# Patient Record
Sex: FEMALE | Race: WHITE | NOT HISPANIC OR LATINO | Employment: STUDENT | URBAN - METROPOLITAN AREA
[De-identification: names, ages, dates, MRNs, and addresses within clinical notes are randomized per-mention and may not be internally consistent; named-entity substitution may affect disease eponyms.]

---
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
## 2023-06-07 ENCOUNTER — TRANSCRIBE ORDERS (OUTPATIENT)
Dept: NUTRITION | Facility: HOSPITAL | Age: 23
End: 2023-06-07
Payer: COMMERCIAL

## 2023-06-07 DIAGNOSIS — R63.5 ABNORMAL WEIGHT GAIN: Primary | ICD-10-CM

## 2024-11-07 ENCOUNTER — HOSPITAL ENCOUNTER (INPATIENT)
Facility: HOSPITAL | Age: 24
LOS: 7 days | Discharge: HOME | End: 2024-11-15
Attending: EMERGENCY MEDICINE | Admitting: STUDENT IN AN ORGANIZED HEALTH CARE EDUCATION/TRAINING PROGRAM
Payer: COMMERCIAL

## 2024-11-07 DIAGNOSIS — K81.9 CHOLECYSTITIS: ICD-10-CM

## 2024-11-07 DIAGNOSIS — K80.71 CALCULUS OF GALLBLADDER AND BILE DUCT WITH OBSTRUCTION WITHOUT CHOLECYSTITIS: Primary | ICD-10-CM

## 2024-11-07 DIAGNOSIS — R17 JAUNDICE: ICD-10-CM

## 2024-11-07 LAB
ALBUMIN SERPL-MCNC: 4.2 G/DL (ref 3.5–5.2)
ALBUMIN/GLOB SERPL: 1.5 G/DL
ALP SERPL-CCNC: 325 U/L (ref 39–117)
ALT SERPL W P-5'-P-CCNC: 519 U/L (ref 1–33)
ANION GAP SERPL CALCULATED.3IONS-SCNC: 14 MMOL/L (ref 5–15)
AST SERPL-CCNC: 177 U/L (ref 1–32)
BASOPHILS # BLD AUTO: 0.04 10*3/MM3 (ref 0–0.2)
BASOPHILS NFR BLD AUTO: 0.4 % (ref 0–1.5)
BILIRUB SERPL-MCNC: 3.2 MG/DL (ref 0–1.2)
BUN SERPL-MCNC: 9 MG/DL (ref 6–20)
BUN/CREAT SERPL: 10.8 (ref 7–25)
CALCIUM SPEC-SCNC: 9.1 MG/DL (ref 8.6–10.5)
CHLORIDE SERPL-SCNC: 108 MMOL/L (ref 98–107)
CO2 SERPL-SCNC: 21 MMOL/L (ref 22–29)
CREAT SERPL-MCNC: 0.83 MG/DL (ref 0.57–1)
D-LACTATE SERPL-SCNC: 1.1 MMOL/L (ref 0.5–2)
DEPRECATED RDW RBC AUTO: 47.8 FL (ref 37–54)
EGFRCR SERPLBLD CKD-EPI 2021: 103 ML/MIN/1.73
EOSINOPHIL # BLD AUTO: 0.14 10*3/MM3 (ref 0–0.4)
EOSINOPHIL NFR BLD AUTO: 1.4 % (ref 0.3–6.2)
ERYTHROCYTE [DISTWIDTH] IN BLOOD BY AUTOMATED COUNT: 14 % (ref 12.3–15.4)
GLOBULIN UR ELPH-MCNC: 2.8 GM/DL
GLUCOSE SERPL-MCNC: 117 MG/DL (ref 65–99)
HCT VFR BLD AUTO: 38 % (ref 34–46.6)
HGB BLD-MCNC: 12.4 G/DL (ref 12–15.9)
IMM GRANULOCYTES # BLD AUTO: 0.03 10*3/MM3 (ref 0–0.05)
IMM GRANULOCYTES NFR BLD AUTO: 0.3 % (ref 0–0.5)
LIPASE SERPL-CCNC: 60 U/L (ref 13–60)
LYMPHOCYTES # BLD AUTO: 1.46 10*3/MM3 (ref 0.7–3.1)
LYMPHOCYTES NFR BLD AUTO: 14.9 % (ref 19.6–45.3)
MCH RBC QN AUTO: 30.2 PG (ref 26.6–33)
MCHC RBC AUTO-ENTMCNC: 32.6 G/DL (ref 31.5–35.7)
MCV RBC AUTO: 92.5 FL (ref 79–97)
MONOCYTES # BLD AUTO: 0.65 10*3/MM3 (ref 0.1–0.9)
MONOCYTES NFR BLD AUTO: 6.6 % (ref 5–12)
NEUTROPHILS NFR BLD AUTO: 7.46 10*3/MM3 (ref 1.7–7)
NEUTROPHILS NFR BLD AUTO: 76.4 % (ref 42.7–76)
NRBC BLD AUTO-RTO: 0 /100 WBC (ref 0–0.2)
PLATELET # BLD AUTO: 337 10*3/MM3 (ref 140–450)
PMV BLD AUTO: 10.5 FL (ref 6–12)
POTASSIUM SERPL-SCNC: 4.1 MMOL/L (ref 3.5–5.2)
PROCALCITONIN SERPL-MCNC: 0.12 NG/ML (ref 0–0.25)
PROT SERPL-MCNC: 7 G/DL (ref 6–8.5)
RBC # BLD AUTO: 4.11 10*6/MM3 (ref 3.77–5.28)
SODIUM SERPL-SCNC: 143 MMOL/L (ref 136–145)
WBC NRBC COR # BLD AUTO: 9.78 10*3/MM3 (ref 3.4–10.8)

## 2024-11-07 PROCEDURE — 25010000002 ONDANSETRON PER 1 MG: Performed by: EMERGENCY MEDICINE

## 2024-11-07 PROCEDURE — 85025 COMPLETE CBC W/AUTO DIFF WBC: CPT | Performed by: EMERGENCY MEDICINE

## 2024-11-07 PROCEDURE — 80053 COMPREHEN METABOLIC PANEL: CPT | Performed by: EMERGENCY MEDICINE

## 2024-11-07 PROCEDURE — 84145 PROCALCITONIN (PCT): CPT | Performed by: EMERGENCY MEDICINE

## 2024-11-07 PROCEDURE — 84702 CHORIONIC GONADOTROPIN TEST: CPT | Performed by: EMERGENCY MEDICINE

## 2024-11-07 PROCEDURE — 25010000002 MORPHINE PER 10 MG: Performed by: EMERGENCY MEDICINE

## 2024-11-07 PROCEDURE — 83605 ASSAY OF LACTIC ACID: CPT | Performed by: EMERGENCY MEDICINE

## 2024-11-07 PROCEDURE — 99285 EMERGENCY DEPT VISIT HI MDM: CPT

## 2024-11-07 PROCEDURE — 83690 ASSAY OF LIPASE: CPT | Performed by: EMERGENCY MEDICINE

## 2024-11-07 RX ORDER — MORPHINE SULFATE 4 MG/ML
4 INJECTION, SOLUTION INTRAMUSCULAR; INTRAVENOUS ONCE
Status: COMPLETED | OUTPATIENT
Start: 2024-11-07 | End: 2024-11-07

## 2024-11-07 RX ORDER — ONDANSETRON 2 MG/ML
4 INJECTION INTRAMUSCULAR; INTRAVENOUS ONCE
Status: COMPLETED | OUTPATIENT
Start: 2024-11-07 | End: 2024-11-07

## 2024-11-07 RX ORDER — SODIUM CHLORIDE 0.9 % (FLUSH) 0.9 %
10 SYRINGE (ML) INJECTION AS NEEDED
Status: DISCONTINUED | OUTPATIENT
Start: 2024-11-07 | End: 2024-11-15 | Stop reason: HOSPADM

## 2024-11-07 RX ADMIN — MORPHINE SULFATE 4 MG: 4 INJECTION, SOLUTION INTRAMUSCULAR; INTRAVENOUS at 23:07

## 2024-11-07 RX ADMIN — ONDANSETRON 4 MG: 2 INJECTION INTRAMUSCULAR; INTRAVENOUS at 23:07

## 2024-11-08 ENCOUNTER — APPOINTMENT (OUTPATIENT)
Dept: MRI IMAGING | Facility: HOSPITAL | Age: 24
End: 2024-11-08
Payer: COMMERCIAL

## 2024-11-08 ENCOUNTER — ANESTHESIA EVENT (OUTPATIENT)
Dept: GASTROENTEROLOGY | Facility: HOSPITAL | Age: 24
End: 2024-11-08
Payer: COMMERCIAL

## 2024-11-08 ENCOUNTER — ANESTHESIA (OUTPATIENT)
Dept: GASTROENTEROLOGY | Facility: HOSPITAL | Age: 24
End: 2024-11-08
Payer: COMMERCIAL

## 2024-11-08 ENCOUNTER — APPOINTMENT (OUTPATIENT)
Dept: ULTRASOUND IMAGING | Facility: HOSPITAL | Age: 24
End: 2024-11-08
Payer: COMMERCIAL

## 2024-11-08 ENCOUNTER — APPOINTMENT (OUTPATIENT)
Dept: CT IMAGING | Facility: HOSPITAL | Age: 24
End: 2024-11-08
Payer: COMMERCIAL

## 2024-11-08 ENCOUNTER — APPOINTMENT (OUTPATIENT)
Dept: GENERAL RADIOLOGY | Facility: HOSPITAL | Age: 24
End: 2024-11-08
Payer: COMMERCIAL

## 2024-11-08 PROBLEM — K80.50 CHOLEDOCHOLITHIASIS: Status: ACTIVE | Noted: 2024-11-08

## 2024-11-08 PROBLEM — K80.21 CALCULUS OF GALLBLADDER WITH BILIARY OBSTRUCTION BUT WITHOUT CHOLECYSTITIS: Status: ACTIVE | Noted: 2024-11-08

## 2024-11-08 PROBLEM — K80.71 CALCULUS OF GALLBLADDER AND BILE DUCT WITH OBSTRUCTION: Status: ACTIVE | Noted: 2024-11-08

## 2024-11-08 PROBLEM — K80.70 CALCULUS OF GALLBLADDER AND BILE DUCT WITHOUT CHOLECYSTITIS: Status: ACTIVE | Noted: 2024-11-08

## 2024-11-08 PROBLEM — F39 MOOD DISORDER: Status: ACTIVE | Noted: 2024-11-08

## 2024-11-08 PROBLEM — K83.1 BILIARY OBSTRUCTION: Status: ACTIVE | Noted: 2024-11-08

## 2024-11-08 LAB
ALBUMIN SERPL-MCNC: 3.9 G/DL (ref 3.5–5.2)
ALBUMIN/GLOB SERPL: 1.3 G/DL
ALP SERPL-CCNC: 312 U/L (ref 39–117)
ALT SERPL W P-5'-P-CCNC: 467 U/L (ref 1–33)
ANION GAP SERPL CALCULATED.3IONS-SCNC: 14 MMOL/L (ref 5–15)
AST SERPL-CCNC: 142 U/L (ref 1–32)
BASOPHILS # BLD AUTO: 0.04 10*3/MM3 (ref 0–0.2)
BASOPHILS NFR BLD AUTO: 0.5 % (ref 0–1.5)
BILIRUB SERPL-MCNC: 2.9 MG/DL (ref 0–1.2)
BILIRUB UR QL STRIP: NEGATIVE
BUN SERPL-MCNC: 9 MG/DL (ref 6–20)
BUN/CREAT SERPL: 11.7 (ref 7–25)
CALCIUM SPEC-SCNC: 9.1 MG/DL (ref 8.6–10.5)
CHLORIDE SERPL-SCNC: 112 MMOL/L (ref 98–107)
CLARITY UR: CLEAR
CO2 SERPL-SCNC: 21 MMOL/L (ref 22–29)
COLOR UR: YELLOW
CREAT SERPL-MCNC: 0.77 MG/DL (ref 0.57–1)
DEPRECATED RDW RBC AUTO: 48.5 FL (ref 37–54)
EGFRCR SERPLBLD CKD-EPI 2021: 112.7 ML/MIN/1.73
EOSINOPHIL # BLD AUTO: 0.16 10*3/MM3 (ref 0–0.4)
EOSINOPHIL NFR BLD AUTO: 1.9 % (ref 0.3–6.2)
ERYTHROCYTE [DISTWIDTH] IN BLOOD BY AUTOMATED COUNT: 14.1 % (ref 12.3–15.4)
GLOBULIN UR ELPH-MCNC: 2.9 GM/DL
GLUCOSE SERPL-MCNC: 91 MG/DL (ref 65–99)
GLUCOSE UR STRIP-MCNC: NEGATIVE MG/DL
HCG INTACT+B SERPL-ACNC: <0.1 MIU/ML
HCT VFR BLD AUTO: 37.6 % (ref 34–46.6)
HGB BLD-MCNC: 12.3 G/DL (ref 12–15.9)
HGB UR QL STRIP.AUTO: NEGATIVE
IMM GRANULOCYTES # BLD AUTO: 0.02 10*3/MM3 (ref 0–0.05)
IMM GRANULOCYTES NFR BLD AUTO: 0.2 % (ref 0–0.5)
KETONES UR QL STRIP: NEGATIVE
LEUKOCYTE ESTERASE UR QL STRIP.AUTO: NEGATIVE
LYMPHOCYTES # BLD AUTO: 2.2 10*3/MM3 (ref 0.7–3.1)
LYMPHOCYTES NFR BLD AUTO: 25.5 % (ref 19.6–45.3)
MCH RBC QN AUTO: 30.2 PG (ref 26.6–33)
MCHC RBC AUTO-ENTMCNC: 32.7 G/DL (ref 31.5–35.7)
MCV RBC AUTO: 92.4 FL (ref 79–97)
MONOCYTES # BLD AUTO: 0.77 10*3/MM3 (ref 0.1–0.9)
MONOCYTES NFR BLD AUTO: 8.9 % (ref 5–12)
NEUTROPHILS NFR BLD AUTO: 5.43 10*3/MM3 (ref 1.7–7)
NEUTROPHILS NFR BLD AUTO: 63 % (ref 42.7–76)
NITRITE UR QL STRIP: NEGATIVE
NRBC BLD AUTO-RTO: 0 /100 WBC (ref 0–0.2)
PH UR STRIP.AUTO: 5.5 [PH] (ref 5–8)
PLATELET # BLD AUTO: 315 10*3/MM3 (ref 140–450)
PMV BLD AUTO: 10.6 FL (ref 6–12)
POTASSIUM SERPL-SCNC: 4.4 MMOL/L (ref 3.5–5.2)
PROT SERPL-MCNC: 6.8 G/DL (ref 6–8.5)
PROT UR QL STRIP: NEGATIVE
RBC # BLD AUTO: 4.07 10*6/MM3 (ref 3.77–5.28)
SODIUM SERPL-SCNC: 147 MMOL/L (ref 136–145)
SP GR UR STRIP: 1.04 (ref 1–1.03)
UROBILINOGEN UR QL STRIP: ABNORMAL
WBC NRBC COR # BLD AUTO: 8.62 10*3/MM3 (ref 3.4–10.8)

## 2024-11-08 PROCEDURE — 76705 ECHO EXAM OF ABDOMEN: CPT

## 2024-11-08 PROCEDURE — 81003 URINALYSIS AUTO W/O SCOPE: CPT | Performed by: EMERGENCY MEDICINE

## 2024-11-08 PROCEDURE — 25510000001 IOPAMIDOL 61 % SOLUTION: Performed by: EMERGENCY MEDICINE

## 2024-11-08 PROCEDURE — 25010000002 PIPERACILLIN SOD-TAZOBACTAM PER 1 G: Performed by: NURSE PRACTITIONER

## 2024-11-08 PROCEDURE — 74177 CT ABD & PELVIS W/CONTRAST: CPT

## 2024-11-08 PROCEDURE — 85025 COMPLETE CBC W/AUTO DIFF WBC: CPT | Performed by: NURSE PRACTITIONER

## 2024-11-08 PROCEDURE — 80053 COMPREHEN METABOLIC PANEL: CPT | Performed by: NURSE PRACTITIONER

## 2024-11-08 PROCEDURE — 87040 BLOOD CULTURE FOR BACTERIA: CPT | Performed by: NURSE PRACTITIONER

## 2024-11-08 PROCEDURE — 99223 1ST HOSP IP/OBS HIGH 75: CPT | Performed by: NURSE PRACTITIONER

## 2024-11-08 PROCEDURE — 25810000003 SODIUM CHLORIDE 0.9 % SOLUTION: Performed by: NURSE PRACTITIONER

## 2024-11-08 PROCEDURE — 74181 MRI ABDOMEN W/O CONTRAST: CPT

## 2024-11-08 PROCEDURE — 99222 1ST HOSP IP/OBS MODERATE 55: CPT | Performed by: PHYSICIAN ASSISTANT

## 2024-11-08 PROCEDURE — 25010000002 CEFTRIAXONE PER 250 MG: Performed by: INTERNAL MEDICINE

## 2024-11-08 PROCEDURE — 25810000003 SODIUM CHLORIDE 0.9 % SOLUTION: Performed by: INTERNAL MEDICINE

## 2024-11-08 RX ORDER — AMOXICILLIN 250 MG
2 CAPSULE ORAL 2 TIMES DAILY
Status: DISCONTINUED | OUTPATIENT
Start: 2024-11-08 | End: 2024-11-11

## 2024-11-08 RX ORDER — POLYETHYLENE GLYCOL 3350 17 G/17G
17 POWDER, FOR SOLUTION ORAL DAILY PRN
Status: DISCONTINUED | OUTPATIENT
Start: 2024-11-08 | End: 2024-11-11

## 2024-11-08 RX ORDER — NITROGLYCERIN 0.4 MG/1
0.4 TABLET SUBLINGUAL
Status: DISCONTINUED | OUTPATIENT
Start: 2024-11-08 | End: 2024-11-08

## 2024-11-08 RX ORDER — SODIUM CHLORIDE 0.9 % (FLUSH) 0.9 %
10 SYRINGE (ML) INJECTION AS NEEDED
Status: CANCELLED | OUTPATIENT
Start: 2024-11-08

## 2024-11-08 RX ORDER — METRONIDAZOLE 500 MG/1
500 TABLET ORAL EVERY 8 HOURS SCHEDULED
Status: DISCONTINUED | OUTPATIENT
Start: 2024-11-08 | End: 2024-11-10

## 2024-11-08 RX ORDER — DEXTROAMPHETAMINE SACCHARATE, AMPHETAMINE ASPARTATE MONOHYDRATE, DEXTROAMPHETAMINE SULFATE AND AMPHETAMINE SULFATE 7.5; 7.5; 7.5; 7.5 MG/1; MG/1; MG/1; MG/1
1 CAPSULE, EXTENDED RELEASE ORAL DAILY
COMMUNITY
Start: 2024-10-18

## 2024-11-08 RX ORDER — MORPHINE SULFATE 2 MG/ML
2 INJECTION, SOLUTION INTRAMUSCULAR; INTRAVENOUS
Status: DISCONTINUED | OUTPATIENT
Start: 2024-11-08 | End: 2024-11-13

## 2024-11-08 RX ORDER — BISACODYL 5 MG/1
5 TABLET, DELAYED RELEASE ORAL DAILY PRN
Status: DISCONTINUED | OUTPATIENT
Start: 2024-11-08 | End: 2024-11-11

## 2024-11-08 RX ORDER — HYDROCODONE BITARTRATE AND ACETAMINOPHEN 5; 325 MG/1; MG/1
1 TABLET ORAL EVERY 6 HOURS PRN
Status: DISCONTINUED | OUTPATIENT
Start: 2024-11-08 | End: 2024-11-15 | Stop reason: HOSPADM

## 2024-11-08 RX ORDER — IOPAMIDOL 612 MG/ML
85 INJECTION, SOLUTION INTRAVASCULAR
Status: COMPLETED | OUTPATIENT
Start: 2024-11-08 | End: 2024-11-08

## 2024-11-08 RX ORDER — ACETAMINOPHEN 325 MG/1
650 TABLET ORAL EVERY 6 HOURS PRN
Status: DISCONTINUED | OUTPATIENT
Start: 2024-11-08 | End: 2024-11-15 | Stop reason: HOSPADM

## 2024-11-08 RX ORDER — SODIUM CHLORIDE 9 MG/ML
75 INJECTION, SOLUTION INTRAVENOUS CONTINUOUS
Status: DISCONTINUED | OUTPATIENT
Start: 2024-11-08 | End: 2024-11-09

## 2024-11-08 RX ORDER — ONDANSETRON 2 MG/ML
4 INJECTION INTRAMUSCULAR; INTRAVENOUS EVERY 6 HOURS PRN
Status: DISCONTINUED | OUTPATIENT
Start: 2024-11-08 | End: 2024-11-10 | Stop reason: SDUPTHER

## 2024-11-08 RX ORDER — FAMOTIDINE 20 MG/1
20 TABLET, FILM COATED ORAL ONCE
Status: CANCELLED | OUTPATIENT
Start: 2024-11-08 | End: 2024-11-08

## 2024-11-08 RX ORDER — MIDAZOLAM HYDROCHLORIDE 1 MG/ML
1 INJECTION, SOLUTION INTRAMUSCULAR; INTRAVENOUS
Status: CANCELLED | OUTPATIENT
Start: 2024-11-08

## 2024-11-08 RX ORDER — BISACODYL 10 MG
10 SUPPOSITORY, RECTAL RECTAL DAILY PRN
Status: DISCONTINUED | OUTPATIENT
Start: 2024-11-08 | End: 2024-11-11

## 2024-11-08 RX ORDER — SODIUM CHLORIDE, SODIUM LACTATE, POTASSIUM CHLORIDE, CALCIUM CHLORIDE 600; 310; 30; 20 MG/100ML; MG/100ML; MG/100ML; MG/100ML
9 INJECTION, SOLUTION INTRAVENOUS CONTINUOUS
Status: CANCELLED | OUTPATIENT
Start: 2024-11-09 | End: 2024-11-09

## 2024-11-08 RX ORDER — FAMOTIDINE 10 MG/ML
20 INJECTION, SOLUTION INTRAVENOUS ONCE
Status: CANCELLED | OUTPATIENT
Start: 2024-11-08 | End: 2024-11-08

## 2024-11-08 RX ORDER — FLUOXETINE 40 MG/1
1 CAPSULE ORAL DAILY
COMMUNITY
Start: 2024-10-09

## 2024-11-08 RX ORDER — SODIUM CHLORIDE 0.9 % (FLUSH) 0.9 %
10 SYRINGE (ML) INJECTION EVERY 12 HOURS SCHEDULED
Status: CANCELLED | OUTPATIENT
Start: 2024-11-08

## 2024-11-08 RX ORDER — LIDOCAINE HYDROCHLORIDE 10 MG/ML
0.5 INJECTION, SOLUTION EPIDURAL; INFILTRATION; INTRACAUDAL; PERINEURAL ONCE AS NEEDED
Status: CANCELLED | OUTPATIENT
Start: 2024-11-08

## 2024-11-08 RX ADMIN — METRONIDAZOLE 500 MG: 500 TABLET ORAL at 21:42

## 2024-11-08 RX ADMIN — SENNOSIDES AND DOCUSATE SODIUM 2 TABLET: 50; 8.6 TABLET ORAL at 21:43

## 2024-11-08 RX ADMIN — SENNOSIDES AND DOCUSATE SODIUM 2 TABLET: 50; 8.6 TABLET ORAL at 09:15

## 2024-11-08 RX ADMIN — FLUOXETINE HYDROCHLORIDE 40 MG: 20 CAPSULE ORAL at 09:16

## 2024-11-08 RX ADMIN — IOPAMIDOL 85 ML: 612 INJECTION, SOLUTION INTRAVENOUS at 00:45

## 2024-11-08 RX ADMIN — METRONIDAZOLE 500 MG: 500 TABLET ORAL at 14:28

## 2024-11-08 RX ADMIN — HYDROCODONE BITARTRATE AND ACETAMINOPHEN 1 TABLET: 5; 325 TABLET ORAL at 22:50

## 2024-11-08 RX ADMIN — SODIUM CHLORIDE 75 ML/HR: 9 INJECTION, SOLUTION INTRAVENOUS at 21:44

## 2024-11-08 RX ADMIN — PIPERACILLIN AND TAZOBACTAM 3.38 G: 3; .375 INJECTION, POWDER, LYOPHILIZED, FOR SOLUTION INTRAVENOUS at 03:54

## 2024-11-08 RX ADMIN — METRONIDAZOLE 500 MG: 500 TABLET ORAL at 09:16

## 2024-11-08 RX ADMIN — SODIUM CHLORIDE 75 ML/HR: 9 INJECTION, SOLUTION INTRAVENOUS at 03:41

## 2024-11-08 RX ADMIN — HYDROCODONE BITARTRATE AND ACETAMINOPHEN 1 TABLET: 5; 325 TABLET ORAL at 16:35

## 2024-11-08 RX ADMIN — SODIUM CHLORIDE 2000 MG: 900 INJECTION INTRAVENOUS at 09:15

## 2024-11-08 NOTE — PLAN OF CARE
Goal Outcome Evaluation:  Plan of Care Reviewed With: patient        Progress: no change  Outcome Evaluation: Pt transferred to floor from ED. VSS on RA. MRI obtained during shift and IV abx initiated. Pt up ad rowena and independent. No c/o pain. No c/o N/V. Family at bedside. No other concerns noted at this time.

## 2024-11-08 NOTE — PLAN OF CARE
Goal Outcome Evaluation:              Outcome Evaluation: VSS on ra. ERCP completed. scheduled for surgery on 11/09. NPO at [facility]. PRN pain meds given x1. family at bedside. no concerns noted at thistime. continue with current POC.

## 2024-11-08 NOTE — ED NOTES
Eder Wills    Nursing Report ED to Floor:  Mental status: A&Ox4  Ambulatory status: Independent  Oxygen Therapy:  RA  Cardiac Rhythm: WDL  Admitted from: Home/ED  Safety Concerns:  None  Social Issues: None  ED Room #:  06    ED Nurse Phone Extension - [phone] or may call [phone].      HPI:   Chief Complaint   Patient presents with    Flank Pain       Past Medical History:  No past medical history on file.     Past Surgical History:  No past surgical history on file.     Admitting Doctor:   Perry Doshi DO    Consulting Provider(s):  Consults       No orders found for last 30 day(s).             Admitting Diagnosis:   The primary encounter diagnosis was Calculus of gallbladder and bile duct with obstruction without cholecystitis. A diagnosis of Jaundice was also pertinent to this visit.    Most Recent Vitals:   Vitals:    11/08/24 0102 11/08/24 0112 11/08/24 0130 11/08/24 0136   BP: 112/68 111/75 106/66    Pulse: 75  72 85   Resp:   16    Temp:       TempSrc:       SpO2: 98%  98% 98%   Weight:       Height:           Active LDAs/IV Access:   Lines, Drains & Airways       Active LDAs       Name Placement date Placement time Site Days    Peripheral IV 11/07/24 2244 Right Antecubital 11/07/24 2244  Antecubital  less than 1                    Labs (abnormal labs have a star):   Labs Reviewed   COMPREHENSIVE METABOLIC PANEL - Abnormal; Notable for the following components:       Result Value    Glucose 117 (*)     Chloride 108 (*)     CO2 21.0 (*)     ALT (SGPT) 519 (*)     AST (SGOT) 177 (*)     Alkaline Phosphatase 325 (*)     Total Bilirubin 3.2 (*)     All other components within normal limits    Narrative:     GFR Normal >60  Chronic Kidney Disease <60  Kidney Failure <15     CBC WITH AUTO DIFFERENTIAL - Abnormal; Notable for the following components:    Neutrophil % 76.4 (*)     Lymphocyte % 14.9 (*)     Neutrophils, Absolute 7.46 (*)     All other components within normal limits   URINALYSIS W/ MICROSCOPIC IF  "INDICATED (NO CULTURE) - Abnormal; Notable for the following components:    Specific Gravity, UA 1.043 (*)     All other components within normal limits    Narrative:     Urine microscopic not indicated.   LIPASE - Normal   LACTIC ACID, PLASMA - Normal   PROCALCITONIN - Normal    Narrative:     As a Marker for Sepsis (Non-Neonates):    1. <0.5 ng/mL represents a low risk of severe sepsis and/or septic shock.  2. >2 ng/mL represents a high risk of severe sepsis and/or septic shock.    As a Marker for Lower Respiratory Tract Infections that require antibiotic therapy:    PCT on Admission    Antibiotic Therapy       6-12 Hrs later    >0.5                Strongly Recommended  >0.25 - <0.5        Recommended   0.1 - 0.25          Discouraged              Remeasure/reassess PCT  <0.1                Strongly Discouraged     Remeasure/reassess PCT    As 28 day mortality risk marker: \"Change in Procalcitonin Result\" (>80% or <=80%) if Day 0 (or Day 1) and Day 4 values are available. Refer to http://www.[email or web address] [email or web address]cal[facility]    Change in PCT <=80%  A decrease of PCT levels below or equal to 80% defines a positive change in PCT test result representing a higher risk for 28-day all-cause mortality of patients diagnosed with severe sepsis for septic shock.    Change in PCT >80%  A decrease of PCT levels of more than 80% defines a negative change in PCT result representing a lower risk for 28-day all-cause mortality of patients diagnosed with severe sepsis or septic shock.      HCG, QUANTITATIVE, PREGNANCY    Narrative:     HCG Ranges by Gestational Age    Females - non-pregnant premenopausal   </= 1mIU/mL HCG  Females - postmenopausal               </= 7mIU/mL HCG    3 Weeks         5.8 -    71.2 mIU/mL  4 Weeks         9.5 -     750 mIU/mL  5 Weeks         217 -   7,138 mIU/mL  6 Weeks         158 -  31,795 mIU/mL  7 Weeks       3,697 - 163,563 mIU/mL  8 Weeks      32,065 - 149,571 mIU/mL  9 Weeks      63,803 - 151,410 " mIU/mL  10 Weeks     46,509 - 186,977 mIU/mL  12 Weeks     27,832 - 210,612 mIU/mL  14 Weeks     13,950 -  62,530 mIU/mL  15 Weeks     12,039 -  70,971 mIU/mL  16 Weeks      9,040 -  56,451 mIU/mL  17 Weeks      8,175 -  55,868 mIU/mL  18 Weeks      8,099 -  58,176 mIU/mL  Results may be falsely decreased if patient taking Biotin.     CBC AND DIFFERENTIAL    Narrative:     The following orders were created for panel order CBC & Differential.  Procedure                               Abnormality         Status                     ---------                               -----------         ------                     CBC Auto Differential[[id number]]         Abnormal            Final result                 Please view results for these tests on the individual orders.       Meds Given in ED:   Medications   sodium chloride 0.9 % flush 10 mL (has no administration in time range)   ondansetron (ZOFRAN) injection 4 mg (4 mg Intravenous Given 11/7/24 2307)   Morphine sulfate (PF) injection 4 mg (4 mg Intravenous Given 11/7/24 2307)   iopamidol (ISOVUE-300) 61 % injection 85 mL (85 mL Intravenous Given 11/8/24 0045)           Last NIH score:                                                          Dysphagia screening results:  Patient Factors Component (Dysphagia:Stroke or Rule-out)  Best Eye Response: 4-->(E4) spontaneous (11/07/24 2314)  Best Motor Response: 6-->(M6) obeys commands (11/07/24 2314)  Best Verbal Response: 5-->(V5) oriented (11/07/24 2314)  Gurley Coma Scale Score: 15 (11/07/24 2314)     Gurley Coma Scale:  No data recorded     CIWA:        Restraint Type:            Isolation Status:  No active isolations

## 2024-11-08 NOTE — H&P
[facility] Medicine Services  HISTORY AND PHYSICAL    Patient Name: Eder Wills  : 2000  MRN: [id number]  Primary Care Physician: Eva Tovar MD  Date of admission: 2024    Subjective   Subjective     Chief Complaint:  Right upper quadrant pain     HPI:  Eder Wills is a 23 y.o. female with no significant past medical history presents to the ED with complaints of RUQ abdominal pain over the past few weeks.  Patient states she was diagnosed with gallbladder disease and scheduled to see a surgeon outpatient however the pain worsened this evening prompting her arrival to the ED. She denies any recent fever, chills, vomiting, cough, shortness of air, diarrhea or dysuria. She has noticed yellowing of her eyes.   Patient is currently on wegovy and states she has been for about a year.  CT abdomen and pelvis obtained tonight shows cholelithiasis with mild stranding around the gallbladder wall and mild intra and proximal extrahepatic biliary ductal dilatation.  Patient will be admitted to [facility] under the care of the hospitalist for the evaluation and treatment.        Review of Systems   Constitutional:  Positive for appetite change. Negative for activity change, chills, diaphoresis, fatigue, fever and unexpected weight change.   HENT: Negative.     Eyes:  Negative for photophobia and visual disturbance.   Respiratory:  Negative for cough and shortness of breath.    Cardiovascular:  Negative for chest pain, palpitations and leg swelling.   Gastrointestinal:  Positive for abdominal pain and nausea. Negative for abdominal distention, blood in stool, constipation, diarrhea and vomiting.   Genitourinary: Negative.    Musculoskeletal:  Positive for back pain. Negative for neck pain and neck stiffness.   Skin: Negative.    Neurological: Negative.    Psychiatric/Behavioral: Negative.                  Personal History     No past medical history on  file.          No past surgical history on file.    Family History:  family history includes No Known Problems in her father and mother.     Social History:  reports that she has never smoked. She has never used smokeless tobacco. She reports current alcohol use. She reports that she does not use drugs.  Social History     Social History Narrative    Not on file       Medications:       No Known Allergies    Objective   Objective     Vital Signs:   Temp:  [98.2 °F (36.8 °C)] 98.2 °F (36.8 °C)  Heart Rate:  [] 72  Resp:  [16-20] 16  BP: (101-132)/(65-79) 106/66    Physical Exam  Vitals and nursing note reviewed.   Constitutional:       General: She is not in acute distress.     Appearance: Normal appearance. She is not ill-appearing, toxic-appearing or diaphoretic.   HENT:      Head: Normocephalic and atraumatic.      Nose: Nose normal.      Mouth/Throat:      Mouth: Mucous membranes are dry.      Pharynx: Oropharynx is clear.   Eyes:      Extraocular Movements: Extraocular movements intact.      Conjunctiva/sclera: Conjunctivae normal.      Pupils: Pupils are equal, round, and reactive to light.   Cardiovascular:      Rate and Rhythm: Normal rate and regular rhythm.      Pulses: Normal pulses.      Heart sounds: Normal heart sounds.   Pulmonary:      Effort: Pulmonary effort is normal.      Breath sounds: Normal breath sounds.   Abdominal:      General: Bowel sounds are normal. There is no distension.      Palpations: Abdomen is soft. There is no mass.      Tenderness: There is abdominal tenderness. There is no right CVA tenderness, left CVA tenderness, guarding or rebound.      Hernia: No hernia is present.      Comments: RUQ and epigastric tenderness    Musculoskeletal:         General: No swelling, tenderness, deformity or signs of injury. Normal range of motion.      Cervical back: Normal range of motion and neck supple.      Right lower leg: No edema.      Left lower leg: No edema.   Skin:     General:  Skin is warm and dry.   Neurological:      General: No focal deficit present.      Mental Status: She is alert and oriented to person, place, and time. Mental status is at baseline.   Psychiatric:         Mood and Affect: Mood normal.         Behavior: Behavior normal.         Thought Content: Thought content normal.         Judgment: Judgment normal.            Result Review:  I have personally reviewed the results from the time of this admission to 11/8/2024 02:31 EST and agree with these findings:  [x]  Laboratory list / accordion  [x]  Microbiology  [x]  Radiology  [x]  EKG/Telemetry   []  Cardiology/Vascular   []  Pathology  []  Old records  []  Other:  Most notable findings include:     LAB RESULTS:      Lab 11/07/24  2240   WBC 9.78   HEMOGLOBIN 12.4   HEMATOCRIT 38.0   PLATELETS 337   NEUTROS ABS 7.46*   IMMATURE GRANS (ABS) 0.03   LYMPHS ABS 1.46   MONOS ABS 0.65   EOS ABS 0.14   MCV 92.5   PROCALCITONIN 0.12   LACTATE 1.1         Lab 11/07/24  2240   SODIUM 143   POTASSIUM 4.1   CHLORIDE 108*   CO2 21.0*   ANION GAP 14.0   BUN 9   CREATININE 0.83   EGFR 103.0   GLUCOSE 117*   CALCIUM 9.1         Lab 11/07/24  2240   TOTAL PROTEIN 7.0   ALBUMIN 4.2   GLOBULIN 2.8   ALT (SGPT) 519*   AST (SGOT) 177*   BILIRUBIN 3.2*   ALK PHOS 325*   LIPASE 60                     Brief Urine Lab Results  (Last result in the past 365 days)        Color   Clarity   Blood   Leuk Est   Nitrite   Protein   CREAT   Urine HCG        11/08/24 0113 Yellow   Clear   Negative   Negative   Negative   Negative                 Microbiology Results (last 10 days)       ** No results found for the last 240 hours. **            US Gallbladder    Result Date: 11/8/2024  US GALLBLADDER Date of Exam: 11/8/2024 1:33 AM EST Indication: RUQ pain. Comparison: CT abdomen pelvis dated November 8, 2024 Technique: Grayscale and color Doppler ultrasound evaluation of the right upper quadrant was performed. Findings: The liver is homogeneous. There are  no focal liver lesions. Portal venous and hepatic venous flows are normal. There are multiple stones in the gallbladder. There is marked biliary ductal dilatation up to 1.5 cm. Obstructing gallstone would be in the differential. Correlation with MRCP or ERCP may be required. The visualized pancreatic tissue is normal. The right kidney is 9.3 cm in mxyk-vx-scco length and there is no hydronephrosis.     Impression: Impression: 1.Cholelithiasis. 2.Marked biliary ductal dilatation. Correlation with MRCP or ERCP may be required. Electronically Signed: Dmitriy Spaulding MD  11/8/2024 2:28 AM EST  Workstation ID: [id number]    CT Abdomen Pelvis With Contrast    Result Date: 11/8/2024  CT ABDOMEN PELVIS W CONTRAST Date of Exam: 11/8/2024 12:38 AM EST Indication: RUQ pain, jaundice. Comparison: None available. Technique: Axial CT images were obtained of the abdomen and pelvis following the uneventful intravenous administration of 85 mL Isovue-300. Reconstructed coronal and sagittal images were also obtained. Automated exposure control and iterative construction methods were used. Findings: Lung Bases:   The visualized lung bases and lower mediastinal structures are unremarkable. Liver: Liver is normal in size and CT density. No focal lesions. Biliary/Gallbladder:  There are multiple stones in the gallbladder. There is mild stranding around the gallbladder wall. There is mild intra and proximal extrahepatic biliary ductal dilatation. Spleen: Spleen is normal in size and CT density. Pancreas:  Pancreas is normal. There is no evidence of pancreatic mass or peripancreatic fluid. Kidneys:  Kidneys are normal in size. There are no stones or hydronephrosis. Adrenals:  Adrenal glands are unremarkable. Retroperitoneal/Lymph Nodes/Vasculature:  No retroperitoneal adenopathy is identified. Gastrointestinal/Mesentery:  The bowel loops are non-dilated without wall thickening or mass. The appendix appears within normal limits. No evidence of  obstruction. No free air. No mesenteric fluid collections identified. Bladder:  The bladder is normal. Genital:   Unremarkable       Bony Structures:   Visualized bony structures are consistent with the patient's age.     Impression: Impression: Cholelithiasis with mild stranding around the gallbladder wall and mild intra and proximal extrahepatic biliary ductal dilatation. Electronically Signed: Dmitriy Spaulding MD  11/8/2024 1:18 AM EST  Workstation ID: [id number]         Assessment & Plan   Assessment & Plan       Biliary obstruction    Calculus of gallbladder and bile duct with obstruction    Mood disorder    23 year old female diagnosed with gallbladder disease and scheduled to see a surgeon outpatient presents to the ED tonight due to worsening RUQ pain and jaundice.       1) Cholelithiasis with obstruction   -CT abdomen and pelvis shows cholelithiasis with mild stranding around the gallbladder wall and mild intra and proximal extrahepatic biliary ductal dilatation.   -US gallbladder pending   -MRCP   -consult GI in am   -NPO   -pain control   -IVF   -start zosyn     2) Mood disorder   -on prozac               DVT prophylaxis:  scds    CODE STATUS:  Full Code        Expected DischargeTBD     This note has been completed as part of a split-shared workflow.     Signature: Electronically signed by MARITZA Lundberg, 11/08/24, 2:32 AM EST.

## 2024-11-08 NOTE — PROGRESS NOTES
[facility] Medicine Services  ADMISSION FOLLOW-UP NOTE          Patient admitted after midnight, H&P by my partner performed earlier on today's date reviewed.  Interim findings, labs, and charting also reviewed.        The [facility] Hospital Problem List has been managed and updated to include any new diagnoses:  Active Hospital Problems    Diagnosis  POA    **Biliary obstruction [K83.1]  Yes    Calculus of gallbladder and bile duct with obstruction [K80.71]  Yes    Mood disorder [F39]  Yes      Resolved Hospital Problems   No resolved problems to display.         ADDITIONAL PLAN:  - detailed assessment and plan from admission reviewed  - Patient admitted this AM by MARITZA Lundberg, chart reviewed  - Summary: This is a 24 y/o healthy female who developed RT sided back pain, was Dx w/ cholelithiasis and scheduled for CCY, then developed worse pain and conjunctival icterus; labs and imaging on arrival c/w choledocholithiasis    Assessment/plan    Cholelithiasis with choledocholithiasis  Elevated ALP/AST/ALT/bilirubin  -GI consult pending  -General Surgery will need to be involved, consult placed  -d/w pharmacy, change Zosyn to CTX/Flagyl (due to rut'l IVF shortage and slight reduction in cdiff risk)  -Pain control    Mood disorder  -Prohankc      Kel Barron,   11/08/24

## 2024-11-08 NOTE — CASE MANAGEMENT/SOCIAL WORK
Discharge Planning Assessment  [facility]     Patient Name: Eder Wills  MRN: [id number]  Today's Date: 11/8/2024    Admit Date: 11/7/2024    Plan: Home with family transporting.   Discharge Needs Assessment       Row Name 11/08/24 1202       Living Environment    People in Home parent(s)    Name(s) of People in Home Hilary (mother [phone])    In the past 12 months has the electric, gas, oil, or water company threatened to shut off services in your home? No    Primary Care Provided by self    Provides Primary Care For no one    Family Caregiver if Needed parent(s)    Family Caregiver Names Hilary (mother [phone])    Quality of Family Relationships involved;helpful    Able to Return to Prior Arrangements yes       Transportation Needs    In the past 12 months, has lack of transportation kept you from medical appointments or from getting medications? no    In the past 12 months, has lack of transportation kept you from meetings, work, or from getting things needed for daily living? No       Food Insecurity    Within the past 12 months, you worried that your food would run out before you got the money to buy more. Never true    Within the past 12 months, the food you bought just didn't last and you didn't have money to get more. Never true       Transition Planning    Transportation Anticipated family or friend will provide  Hilary (mother [phone])       Discharge Needs Assessment    Readmission Within the Last 30 Days no previous admission in last 30 days    Equipment Currently Used at Home none                   Discharge Plan       Row Name 11/08/24 1204       Plan    Plan Home with family transporting.    Plan Comments IDALIAlinda met with patient and family at bedside. Patient lives in [place] with parents: Hilary (mother [phone]). Patient reports functioning independently with ADL's, No DME or HH. Patients PCP is  Eva Tovar. Patient has [employer]. GI and Surgery  following. Plan is home with family transporting.    Final Discharge Disposition Code 01 - home or self-care                  Continued Care and Services - Admitted Since 11/7/2024    No active coordination exists for this encounter.          Demographic Summary       Row Name 11/08/24 1201       General Information    Admission Type inpatient    Referral Source admission list    Reason for Consult discharge planning                   Functional Status       Row Name 11/08/24 1201       Functional Status    Usual Activity Tolerance good    Current Activity Tolerance good       Physical Activity    On average, how many days per week do you engage in moderate to strenuous exercise (like a brisk walk)? 7 days    On average, how many minutes do you engage in exercise at this level? 30 min    Number of minutes of exercise per week 210       Assessment of Health Literacy    How often do you have someone help you read hospital materials? Occasionally    How often do you have problems learning about your medical condition because of difficulty understanding written information? Occasionally    How often do you have a problem understanding what is told to you about your medical condition? Occasionally    How confident are you filling out medical forms by yourself? A little bit    Health Literacy Good       Functional Status, IADL    Medications independent    Meal Preparation independent    Housekeeping independent    Laundry independent    Shopping independent    If for any reason you need help with day-to-day activities such as bathing, preparing meals, shopping, managing finances, etc., do you get the help you need? I don't need any help    IADL Comments reports functioning independently with ADL's, No DME or HH       Employment/    Employment/ Comments [employer]                   Psychosocial       Row Name 11/08/24 1201       Values/Beliefs    Spiritual, Cultural Beliefs, Yazidi  Practices, Values that Affect Care no       Mental Health    Little interest or pleasure in doing things Not at all    Feeling down, depressed, or hopeless Not at all    Why did the patient not complete the Depression Screen questions? Patient declined                   Abuse/Neglect       Row Name 11/08/24 1202       Personal Safety    Feels Unsafe at Home or Work/School no    Feels Threatened by Someone no    Does Anyone Try to Keep You From Having Contact with Others or Doing Things Outside Your Home? no    Physical Signs of Abuse Present no                   Legal       Row Name 11/08/24 1202       Financial Resource Strain    How hard is it for you to pay for the very basics like food, housing, medical care, and heating? Not hard                   Substance Abuse       Row Name 11/08/24 1202       Substance Use    Substance Use Status never used       AUDIT-C (Alcohol Use Disorders Identification Test)    Q1: How often do you have a drink containing alcohol? Never    Q2: How many drinks containing alcohol do you have on a typical day when you are drinking? None    Q3: How often do you have six or more drinks on one occasion? Never    Audit-C Score 0                   Patient Forms    No documentation.                     Teresa Ramos) ABDIRASHID Calderon

## 2024-11-08 NOTE — CONSULTS
General Surgery Consultation Note    Date of Service: 11/8/2024  Eder Wills  [id number]  2000      Referring Provider: Kel Barron DO    Location of Consult: Inpatient     Reason for Consultation: Choledocholithiasis       History of Present Illness:  I am seeing, Eder Wills, in consultation at request of Kel Barron DO regarding choledocholithiasis.  She is a 23-year-old lady without significant past medical history who presents to [facility] with complaints of 2 weeks of epigastric and right upper quadrant pain.  This has been ongoing for the past 6 weeks or so but has been worse over the last 2 weeks.  Starting on Wednesday night the pain has been particularly worse.  She describes this as a pain on the right side of her abdomen that radiates to the back.  This is associated with nausea and vomiting.  She thinks that the symptoms have been generally associated with eating over the last few weeks.  She has not had any fevers.  She was previously seen in an outside ER and there was some concern for gallbladder disease and she was apparently set for outpatient surgical for follow-up.  She has no history of prior abdominal surgeries..      Problems Addressed this Visit          Gastrointestinal Abdominal     Calculus of gallbladder and bile duct with obstruction - Primary     Other Visit Diagnoses       Jaundice              Diagnoses         Codes Comments    Calculus of gallbladder and bile duct with obstruction without cholecystitis    -  Primary ICD-10-CM: K80.71  ICD-9-CM: 574.91     Jaundice     ICD-10-CM: R17  ICD-9-CM: 782.4             PMHx:  History reviewed. No pertinent past medical history.    Past Surgical History:  Past Surgical History:    ADENOIDECTOMY    COSMETIC SURGERY    2021    TONSILLECTOMY       Allergies:  No Known Allergies    Medications:  No current facility-administered medications on file prior to encounter.     Current Outpatient  Medications on File Prior to Encounter   Medication Sig Dispense Refill    amphetamine-dextroamphetamine XR (ADDERALL XR) 30 MG 24 hr capsule Take 1 capsule by mouth Daily      FLUoxetine (PROzac) 40 MG capsule Take 1 capsule by mouth Daily.           Current Facility-Administered Medications:     acetaminophen (TYLENOL) tablet 650 mg, 650 mg, Oral, Q6H PRN, Kel Barron DO    sennosides-docusate (PERICOLACE) 8.6-50 MG per tablet 2 tablet, 2 tablet, Oral, BID, 2 tablet at 11/08/24 0915 **AND** polyethylene glycol (MIRALAX) packet 17 g, 17 g, Oral, Daily PRN **AND** bisacodyl (DULCOLAX) EC tablet 5 mg, 5 mg, Oral, Daily PRN **AND** bisacodyl (DULCOLAX) suppository 10 mg, 10 mg, Rectal, Daily PRN, Rosalinda, Elli, APRN    cefTRIAXone (ROCEPHIN) 2,000 mg in sodium chloride 0.9 % 100 mL MBP, 2,000 mg, Intravenous, Q24H, Kel Barron DO, Last Rate: 200 mL/hr at 11/08/24 0915, 2,000 mg at 11/08/24 0915    FLUoxetine (PROzac) capsule 40 mg, 40 mg, Oral, Daily, Rosalinda, Elli, APRN, 40 mg at 11/08/24 0916    HYDROcodone-acetaminophen (NORCO) 5-325 MG per tablet 1 tablet, 1 tablet, Oral, Q6H PRN, Kel Barron DO    metroNIDAZOLE (FLAGYL) tablet 500 mg, 500 mg, Oral, Q8H, Kel Barron DO, 500 mg at 11/08/24 0916    morphine injection 2 mg, 2 mg, Intravenous, Q3H PRN, Rosalinda, Elli, APRN    ondansetron (ZOFRAN) injection 4 mg, 4 mg, Intravenous, Q6H PRN, Rosalinda, Elli, APRN    [COMPLETED] Insert Peripheral IV, , , Once **AND** sodium chloride 0.9 % flush 10 mL, 10 mL, Intravenous, PRN, Remi Campos MD    sodium chloride 0.9 % infusion, 75 mL/hr, Intravenous, Continuous, Kel Barron DO, Last Rate: 75 mL/hr at 11/08/24 0341, 75 mL/hr at 11/08/24 0341      Family History:  Family History   Problem Relation Age of Onset    No Known Problems Mother     No Known Problems Father        Social History: Pt lives in [place], [place].    Tobacco use: Denies     EtOH use :  "Occasional   Illicit drug use: Denies       Review of Systems:   Constitutional: No fevers, chills or malaise   Eyes: Denies visual changes    Cardiovascular: Denies chest pain, palpitations   Pulmonary: Denies cough or shortness of breath   Abdominal/ GI: See HPI    Genitourinary: Denies dysuria or hematuria   Musculoskeletal: Denies any but chronic joint aches, pains or deformities   Psychiatric: No recent mood changes   Neurologic: No paresthesias or loss of function    BP 94/60 (BP Location: Left arm, Patient Position: Lying)   Pulse 73   Temp 97.9 °F (36.6 °C) (Oral)   Resp 18   Ht 157.5 cm (62\")   Wt 84.4 kg (186 lb)   LMP 10/29/2024 (Approximate)   SpO2 98%   BMI 34.02 kg/m²   Body mass index is 34.02 kg/m².    Gen: Awake, alert, no acute distress, resting in bed, she appears jaundice  Head: Normocephalic, atraumatic.   Eyes: Pupils equal, round, react to light and accommodation.  Scleral icterus present  Mouth: Oral mucosa without lesions,   Neck: No masses, lymphadenopathy or carotid bruits bilaterally   CV: Rhythm and rate regular, no murmurs, rubs or gallops  Lungs: Clear to auscultation bilaterally, not labored on room air   Abdomen: Soft, no obvious scars, there is some mild diffuse tenderness to palpation throughout the upper abdomen  Groin : No obvious hernias bilaterally   Extremities:  No cyanosis, clubbing or edema bilaterally  Lymphatics: No abnormal lymphadenopathy appreciated   Neurologic: No gross deficits         CBC  Results from last 7 days   Lab Units 11/08/24  0527   WBC 10*3/mm3 8.62   HEMOGLOBIN g/dL 12.3   HEMATOCRIT % 37.6   PLATELETS 10*3/mm3 315       CMP  Results from last 7 days   Lab Units 11/08/24  0527   SODIUM mmol/L 147*   POTASSIUM mmol/L 4.4   CHLORIDE mmol/L 112*   CO2 mmol/L 21.0*   BUN mg/dL 9   CREATININE mg/dL 0.77   CALCIUM mg/dL 9.1   BILIRUBIN mg/dL 2.9*   ALK PHOS U/L 312*   ALT (SGPT) U/L 467*   AST (SGOT) U/L 142*   GLUCOSE mg/dL 91 "       Radiology  Imaging Results (Last 72 Hours)       Procedure Component Value Units Date/Time    MRI abdomen wo contrast mrcp [[id number]] Collected: 11/08/24 0546     Updated: 11/08/24 0559    Narrative:      MRI ABDOMEN WO CONTRAST MRCP    Date of Exam: 11/8/2024 4:57 AM EST    Indication: ? obstructing stone.  RUQ pain, cholelithiasis on CT A/P.     Comparison: Ultrasound right upper quadrant November 8, 2024; CT abdomen pelvis November 8, 2024    Technique: Standard noncontrast MR pulse sequences of the abdomen were obtained. High-resolution T2 MRCP images were acquired and 3-D MIP reconstructions were created for interpretation.    Findings:  Lung bases appear grossly clear.  Visualized mediastinal structures are unremarkable.  Superficial fat and underlying musculature appear within normal limits.  Bone marrow signal appears grossly unremarkable.    The liver appears normal in size and signal.  Liver vasculature appears conventional and patent.  There is no intrahepatic biliary dilatation.    The gallbladder is nondistended. There are innumerable stones throughout the gallbladder. There is gallbladder wall thickening and pericholecystic fluid suggestive of acute cholecystitis. Common bile duct measures 8 mm diameter. There are a few small   stones in the distal common bile duct    The pancreas appears normal signal without solid or cystic mass or adjacent inflammation. The pancreatic duct is nondistended.  There is no evidence of pancreatic divisum.    The spleen is normal size and signal.  There is no adrenal mass.  No solid or cystic kidney mass or hydronephrosis.  There is no focal intraperitoneal inflammation or fluid collection.  No evidence of ascites. No lymphadenopathy.      Impression:      Impression:  1.Cholelithiasis with gallbladder wall thickening and pericholecystic fluid suggestive of acute cholecystitis.  2.Choledocholithiasis with a few small stones in the distal common bile  duct.        Electronically Signed: Dmitriy Spaulding MD    11/8/2024 5:56 AM EST    Workstation ID: [id number]    US Gallbladder [[id number]] Collected: 11/08/24 0225     Updated: 11/08/24 0231    Narrative:      US GALLBLADDER    Date of Exam: 11/8/2024 1:33 AM EST    Indication: RUQ pain.    Comparison: CT abdomen pelvis dated November 8, 2024    Technique: Grayscale and color Doppler ultrasound evaluation of the right upper quadrant was performed.      Findings:  The liver is homogeneous. There are no focal liver lesions. Portal venous and hepatic venous flows are normal.    There are multiple stones in the gallbladder. There is marked biliary ductal dilatation up to 1.5 cm. Obstructing gallstone would be in the differential. Correlation with MRCP or ERCP may be required.    The visualized pancreatic tissue is normal. The right kidney is 9.3 cm in [id number] length and there is no hydronephrosis.      Impression:      Impression:  1.Cholelithiasis.  2.Marked biliary ductal dilatation. Correlation with MRCP or ERCP may be required.        Electronically Signed: Dmitriy Spaulding MD    11/8/2024 2:28 AM EST    Workstation ID: [id number]    CT Abdomen Pelvis With Contrast [[id number]] Collected: 11/08/24 0115     Updated: 11/08/24 0121    Narrative:      CT ABDOMEN PELVIS W CONTRAST    Date of Exam: 11/8/2024 12:38 AM EST    Indication: RUQ pain, jaundice.    Comparison: None available.    Technique: Axial CT images were obtained of the abdomen and pelvis following the uneventful intravenous administration of 85 mL Isovue-300. Reconstructed coronal and sagittal images were also obtained. Automated exposure control and iterative   construction methods were used.      Findings:  Lung Bases:     The visualized lung bases and lower mediastinal structures are unremarkable.    Liver:  Liver is normal in size and CT density. No focal lesions.    Biliary/Gallbladder:    There are multiple stones in the gallbladder. There is mild stranding  around the gallbladder wall. There is mild intra and proximal extrahepatic biliary ductal dilatation.    Spleen:  Spleen is normal in size and CT density.    Pancreas:    Pancreas is normal. There is no evidence of pancreatic mass or peripancreatic fluid.    Kidneys:    Kidneys are normal in size. There are no stones or hydronephrosis.    Adrenals:    Adrenal glands are unremarkable.    Retroperitoneal/Lymph Nodes/Vasculature:    No retroperitoneal adenopathy is identified.    Gastrointestinal/Mesentery:    The bowel loops are non-dilated without wall thickening or mass. The appendix appears within normal limits. No evidence of obstruction. No free air. No mesenteric fluid collections identified.    Bladder:    The bladder is normal.    Genital:     Unremarkable          Bony Structures:     Visualized bony structures are consistent with the patient's age.        Impression:      Impression:  Cholelithiasis with mild stranding around the gallbladder wall and mild intra and proximal extrahepatic biliary ductal dilatation.        Electronically Signed: Dmitriy Spaulding MD    11/8/2024 1:18 AM EST    Workstation ID: [id number]                Results Review: I have personally reviewed all of the recent lab and imaging results available at this time.     Vital signs are stable    White count is 8.  Lipase not elevated.  Liver function tests are elevated with an AST of 142, ALT of 467, total bilirubin of 2.9    I have reviewed a right upper quadrant ultrasound which demonstrates findings of cholelithiasis and biliary dilation    I have reviewed a CT scan of the abdomen and pelvis which demonstrates cholelithiasis and a distended gallbladder with some mild biliary dilation    I have reviewed an MRI which demonstrates findings of choledocholithiasis    Assessment:  Mrs. Wills is a 23-year-old lady without significant past medical history with choledocholithiasis.  Her clinical history and workup are consistent with  choledocholithiasis.  I agree with gastroenterology consult for ERCP.  Likely cholecystectomy this admission following ERCP    Plan:  -Agree GI consult for ERCP  -IV antibiotics  -Likely cholecystectomy this admission pending ERCP eval      I discussed the patient's findings and my recommendations with the patient and/or family, as well as the primary team     Raheem Raza MD  11/08/24  09:37 EST      Part of this note may be an electronic transcription/translation of spoken language to printed text using the Dragon Dictation System.

## 2024-11-08 NOTE — ANESTHESIA PREPROCEDURE EVALUATION
Anesthesia Evaluation     Patient summary reviewed and Nursing notes reviewed   no history of anesthetic complications:   NPO Solid Status: > 8 hours  NPO Liquid Status: > 2 hours           Airway   Mallampati: II  TM distance: >3 FB  Neck ROM: full  No difficulty expected  Dental - normal exam     Pulmonary    (-) not a smoker  Cardiovascular - negative cardio ROS        Neuro/Psych  (+) psychiatric history (adderall prozac)  (-) seizures, CVA  GI/Hepatic/Renal/Endo    (+) obesity  (-) liver disease (bili >2), no renal disease, diabetes, no thyroid disorder    ROS Comment: Cholelithiasis with obstruction     Musculoskeletal     Abdominal    Substance History      OB/GYN          Other                          Anesthesia Plan    ASA 2     general     intravenous induction     Anesthetic plan, risks, benefits, and alternatives have been provided, discussed and informed consent has been obtained with: patient.  Pre-procedure education provided  Plan discussed with CRNA.        CODE STATUS:    Level Of Support Discussed With: Patient  Code Status (Patient has no pulse and is not breathing): CPR (Attempt to Resuscitate)  Medical Interventions (Patient has pulse or is breathing): Full Support

## 2024-11-08 NOTE — ED PROVIDER NOTES
Subjective   History of Present Illness  Is a 23-year-old female presented to the emergency department with right upper abdominal discomfort.  Patient is had these pains for the last few weeks.  She was diagnosed with gallbladder disease.  She is scheduled to see a surgeon outpatient.  Patient states that the pain intensified this evening.  She is having some sharp stabbing pain in her right upper quadrant.  It radiates to her back.  She also noted some jaundice of her eyes.  She has been nauseous, however no vomiting.  Denies any diarrhea.  No hematuria or dysuria.  No fevers or chills.  No headache or change in vision.  No focal weakness.  No chest pain or shortness of breath    History provided by:  Patient   used: No        Review of Systems   Constitutional:  Negative for chills and fever.   HENT:  Negative for congestion, ear pain and sore throat.    Eyes:  Negative for visual disturbance.   Respiratory:  Negative for shortness of breath.    Cardiovascular:  Negative for chest pain.   Gastrointestinal:  Positive for abdominal pain and nausea.   Genitourinary:  Negative for difficulty urinating.   Musculoskeletal:  Negative for arthralgias.   Skin:  Positive for color change. Negative for rash.   Neurological:  Negative for dizziness, weakness and numbness.   Psychiatric/Behavioral:  Negative for agitation.        No past medical history on file.    No Known Allergies    No past surgical history on file.    No family history on file.    Social History     Socioeconomic History    Marital status: Single           Objective   Physical Exam  Vitals and nursing note reviewed.   Constitutional:       General: She is not in acute distress.     Appearance: She is not ill-appearing or toxic-appearing.   HENT:      Mouth/Throat:      Pharynx: No posterior oropharyngeal erythema.   Eyes:      General: Scleral icterus present.      Conjunctiva/sclera: Conjunctivae normal.      Pupils: Pupils are equal,  round, and reactive to light.   Cardiovascular:      Rate and Rhythm: Normal rate and regular rhythm.   Pulmonary:      Effort: Pulmonary effort is normal. No respiratory distress.   Abdominal:      General: Abdomen is flat. There is no distension.      Palpations: There is no mass.      Tenderness: There is abdominal tenderness (Tenderness in the right upper quadrant). There is no guarding or rebound.   Musculoskeletal:         General: No deformity. Normal range of motion.   Skin:     General: Skin is warm.      Findings: No rash.   Neurological:      General: No focal deficit present.      Mental Status: She is alert and oriented to person, place, and time.      Motor: No weakness.         Procedures           ED Course  ED Course as of 11/08/24 0149   u Nov 07, 2024 2202 BP: 132/79 [JK]   2202 Temp: 98.2 °F (36.8 °C) [JK]   2202 Temp src: Oral [JK]   2202 Heart Rate: 100 [JK]   2202 Resp: 20 [JK]   2202 SpO2: 97 %  Interpretation:  Patient's repeat vitals, telemetry tracing, and pulse oximetry tracing were directly viewed and interpreted by myself.   O2 sat 97% on room, interpreted as normal  Telemetry rhythm strip revealed a rate of 100 bpm, interpreted as normal sinus rhythm [JK]   Fri Nov 08, 2024   0147 CBC & Differential(!) [JK]   0148 Lipase [JK]   0148 Lactic Acid, Plasma [JK]   0148 hCG, Quantitative, Pregnancy [JK]   0148 Procalcitonin [JK]   0148 Comprehensive Metabolic Panel(!) [JK]   0148 Urinalysis With Microscopic If Indicated (No Culture) - Urine, Clean Catch(!)  Interpretation:  Laboratory studies were reviewed and interpreted directly by myself.  CBC was unremarkable, lipase normal, lactic normal, hCG quant was negative, procalcitonin normal, CMP showed significant elevation in ALT of 519, , bilirubin at 3.2 [JK]   0148 Interpretation:  Imaging was directly visualized by myself, per my interpretations, CT abdomen pelvis showed cholelithiasis with proximal extrahepatic biliary ductal  dilation.  [JK]   0148 On reevaluation, the patient's pain is significantly improved.  Findings are consistent with obstruction of the biliary tract secondary to cholelithiasis.  Patient will be admitted to hospital for further evaluation and treatment. [JK]   0148 Based on the patient's presentation, history and diffuse work-up in the emergency department, the patient is deemed appropriate for admission to the hospital for further evaluation and treatment.  This was discussed with the patient at bedside.  They are in agreement with the current medical management.    Admitting physician: Dr. Doshi    Discussion was had with admitting physician regarding the laboratory and imaging findings.  We did discuss current therapeutics in the emergency department and progression of the patient.  Working diagnosis was conveyed to the admitting physician, as well as current status and prognosis for the patient.  They are in agreement with these findings and have accepted admission.    Shared decision making:   After full review of the patient's clinical presentation, review of any work-up including but not limited to laboratory studies and radiology obtained, I had a discussion with the patient.  Treatment options were discussed as well as the risks, benefits and consequences.  I discussed all findings with the patient and family members if available.  During the discussion, treatment goals were understood by all as well as any misconceptions which were addressed with the patient.  Ample time was given for any questions they may have had.  They are in agreement with the treatment plan as well as final disposition. [JK]      ED Course User Index  [JK] Remi Campos MD                                               Medical Decision Making  This is a 23-year-old female presenting to the emergency department with some right upper abdominal discomfort.  The patient symptoms are concerning for biliary colic.  However she does  have evidence of some jaundice in the eyes.  I am concerned for possible biliary obstruction or cola cystitis.  Overall, the patient is nontoxic.  Afebrile.  IV access was established and the patient.  Placed on continuous telemetry monitoring.  Given the patient's presentation, differential is broad and will require further evaluation.  Workup initiated.      Differential diagnosis: Peptic ulcer disease, dyspepsia, GERD, pancreatitis, biliary colic, electrolyte abnormality, acute kidney injury, biliary obstruction, cholecystitis      Amount and/or Complexity of Data Reviewed  External Data Reviewed: labs, radiology and notes.     Details: External laboratories, imaging as well as notes were reviewed personally by myself.  All relevant studies were used to guide decision making.     Date of previous record: 8/18/2024    Source of note:  ER    Summary: Patient was seen evaluate for some back pain.  I did review basic laboratory studies on file as well as a previous CT abdomen pelvis.  Records reviewed    Labs: ordered. Decision-making details documented in ED Course.  Radiology: ordered and independent interpretation performed. Decision-making details documented in ED Course.    Risk  Prescription drug management.        Final diagnoses:   Calculus of gallbladder and bile duct with obstruction without cholecystitis   Jaundice       ED Disposition  ED Disposition       ED Disposition   Decision to Admit    Condition   --    Comment   --               No follow-up provider specified.       Medication List      No changes were made to your prescriptions during this visit.            Remi Campos MD  11/08/24 0149

## 2024-11-09 ENCOUNTER — APPOINTMENT (OUTPATIENT)
Dept: GENERAL RADIOLOGY | Facility: HOSPITAL | Age: 24
End: 2024-11-09
Payer: COMMERCIAL

## 2024-11-09 LAB
ALBUMIN SERPL-MCNC: 4 G/DL (ref 3.5–5.2)
ALBUMIN/GLOB SERPL: 1.7 G/DL
ALP SERPL-CCNC: 258 U/L (ref 39–117)
ALT SERPL W P-5'-P-CCNC: 432 U/L (ref 1–33)
ANION GAP SERPL CALCULATED.3IONS-SCNC: 12 MMOL/L (ref 5–15)
AST SERPL-CCNC: 155 U/L (ref 1–32)
BILIRUB SERPL-MCNC: 2.4 MG/DL (ref 0–1.2)
BUN SERPL-MCNC: 10 MG/DL (ref 6–20)
BUN/CREAT SERPL: 14.7 (ref 7–25)
CALCIUM SPEC-SCNC: 9.1 MG/DL (ref 8.6–10.5)
CHLORIDE SERPL-SCNC: 106 MMOL/L (ref 98–107)
CO2 SERPL-SCNC: 21 MMOL/L (ref 22–29)
CREAT SERPL-MCNC: 0.68 MG/DL (ref 0.57–1)
EGFRCR SERPLBLD CKD-EPI 2021: 127.3 ML/MIN/1.73
GLOBULIN UR ELPH-MCNC: 2.4 GM/DL
GLUCOSE SERPL-MCNC: 102 MG/DL (ref 65–99)
POTASSIUM SERPL-SCNC: 4.1 MMOL/L (ref 3.5–5.2)
PROT SERPL-MCNC: 6.4 G/DL (ref 6–8.5)
SODIUM SERPL-SCNC: 139 MMOL/L (ref 136–145)

## 2024-11-09 PROCEDURE — 25010000002 LIDOCAINE PF 1% 1 % SOLUTION

## 2024-11-09 PROCEDURE — 99232 SBSQ HOSP IP/OBS MODERATE 35: CPT | Performed by: NURSE PRACTITIONER

## 2024-11-09 PROCEDURE — 0FJB8ZZ INSPECTION OF HEPATOBILIARY DUCT, VIA NATURAL OR ARTIFICIAL OPENING ENDOSCOPIC: ICD-10-PCS | Performed by: INTERNAL MEDICINE

## 2024-11-09 PROCEDURE — 25010000002 CEFTRIAXONE PER 250 MG: Performed by: INTERNAL MEDICINE

## 2024-11-09 PROCEDURE — 25010000002 GLUCAGON (RDNA) PER 1 MG

## 2024-11-09 PROCEDURE — 25010000002 MORPHINE PER 10 MG: Performed by: INTERNAL MEDICINE

## 2024-11-09 PROCEDURE — 25010000002 ONDANSETRON PER 1 MG

## 2024-11-09 PROCEDURE — 25010000002 SUGAMMADEX 200 MG/2ML SOLUTION

## 2024-11-09 PROCEDURE — 25010000002 PROPOFOL 10 MG/ML EMULSION

## 2024-11-09 PROCEDURE — 25010000002 METOCLOPRAMIDE PER 10 MG: Performed by: NURSE PRACTITIONER

## 2024-11-09 PROCEDURE — C1769 GUIDE WIRE: HCPCS | Performed by: INTERNAL MEDICINE

## 2024-11-09 PROCEDURE — 25810000003 LACTATED RINGERS PER 1000 ML

## 2024-11-09 PROCEDURE — 25010000002 SODIUM CHLORIDE 0.9 % WITH KCL 20 MEQ 20-0.9 MEQ/L-% SOLUTION: Performed by: NURSE PRACTITIONER

## 2024-11-09 PROCEDURE — 74328 X-RAY BILE DUCT ENDOSCOPY: CPT | Performed by: INTERNAL MEDICINE

## 2024-11-09 PROCEDURE — 74330 X-RAY BILE/PANC ENDOSCOPY: CPT

## 2024-11-09 PROCEDURE — 43273 ENDOSCOPIC PANCREATOSCOPY: CPT | Performed by: INTERNAL MEDICINE

## 2024-11-09 PROCEDURE — 80053 COMPREHEN METABOLIC PANEL: CPT | Performed by: INTERNAL MEDICINE

## 2024-11-09 PROCEDURE — 43262 ENDO CHOLANGIOPANCREATOGRAPH: CPT | Performed by: INTERNAL MEDICINE

## 2024-11-09 PROCEDURE — 25010000002 DEXAMETHASONE PER 1 MG

## 2024-11-09 RX ORDER — SODIUM CHLORIDE, SODIUM LACTATE, POTASSIUM CHLORIDE, CALCIUM CHLORIDE 600; 310; 30; 20 MG/100ML; MG/100ML; MG/100ML; MG/100ML
INJECTION, SOLUTION INTRAVENOUS CONTINUOUS PRN
Status: DISCONTINUED | OUTPATIENT
Start: 2024-11-09 | End: 2024-11-09 | Stop reason: SURG

## 2024-11-09 RX ORDER — ONDANSETRON 2 MG/ML
INJECTION INTRAMUSCULAR; INTRAVENOUS
Status: COMPLETED
Start: 2024-11-09 | End: 2024-11-09

## 2024-11-09 RX ORDER — LIDOCAINE HYDROCHLORIDE 10 MG/ML
INJECTION, SOLUTION EPIDURAL; INFILTRATION; INTRACAUDAL; PERINEURAL AS NEEDED
Status: DISCONTINUED | OUTPATIENT
Start: 2024-11-09 | End: 2024-11-09 | Stop reason: SURG

## 2024-11-09 RX ORDER — PROPOFOL 10 MG/ML
VIAL (ML) INTRAVENOUS AS NEEDED
Status: DISCONTINUED | OUTPATIENT
Start: 2024-11-09 | End: 2024-11-09 | Stop reason: SURG

## 2024-11-09 RX ORDER — ONDANSETRON 2 MG/ML
4 INJECTION INTRAMUSCULAR; INTRAVENOUS ONCE AS NEEDED
Status: DISCONTINUED | OUTPATIENT
Start: 2024-11-09 | End: 2024-11-09 | Stop reason: HOSPADM

## 2024-11-09 RX ORDER — SODIUM CHLORIDE AND POTASSIUM CHLORIDE 150; 900 MG/100ML; MG/100ML
75 INJECTION, SOLUTION INTRAVENOUS CONTINUOUS
Status: DISCONTINUED | OUTPATIENT
Start: 2024-11-09 | End: 2024-11-10

## 2024-11-09 RX ORDER — ONDANSETRON 2 MG/ML
INJECTION INTRAMUSCULAR; INTRAVENOUS AS NEEDED
Status: DISCONTINUED | OUTPATIENT
Start: 2024-11-09 | End: 2024-11-09 | Stop reason: SURG

## 2024-11-09 RX ORDER — METOCLOPRAMIDE HYDROCHLORIDE 5 MG/ML
10 INJECTION INTRAMUSCULAR; INTRAVENOUS EVERY 6 HOURS
Status: DISCONTINUED | OUTPATIENT
Start: 2024-11-09 | End: 2024-11-13

## 2024-11-09 RX ORDER — IPRATROPIUM BROMIDE AND ALBUTEROL SULFATE 2.5; .5 MG/3ML; MG/3ML
3 SOLUTION RESPIRATORY (INHALATION) ONCE AS NEEDED
Status: DISCONTINUED | OUTPATIENT
Start: 2024-11-09 | End: 2024-11-09 | Stop reason: HOSPADM

## 2024-11-09 RX ORDER — DEXAMETHASONE SODIUM PHOSPHATE 4 MG/ML
INJECTION, SOLUTION INTRA-ARTICULAR; INTRALESIONAL; INTRAMUSCULAR; INTRAVENOUS; SOFT TISSUE AS NEEDED
Status: DISCONTINUED | OUTPATIENT
Start: 2024-11-09 | End: 2024-11-09 | Stop reason: SURG

## 2024-11-09 RX ORDER — SIMETHICONE 80 MG
80 TABLET,CHEWABLE ORAL 4 TIMES DAILY PRN
Status: DISCONTINUED | OUTPATIENT
Start: 2024-11-09 | End: 2024-11-15 | Stop reason: HOSPADM

## 2024-11-09 RX ORDER — IBUPROFEN 600 MG/1
TABLET ORAL AS NEEDED
Status: DISCONTINUED | OUTPATIENT
Start: 2024-11-09 | End: 2024-11-09 | Stop reason: SURG

## 2024-11-09 RX ORDER — ROCURONIUM BROMIDE 10 MG/ML
INJECTION, SOLUTION INTRAVENOUS AS NEEDED
Status: DISCONTINUED | OUTPATIENT
Start: 2024-11-09 | End: 2024-11-09 | Stop reason: SURG

## 2024-11-09 RX ORDER — ONDANSETRON 2 MG/ML
4 INJECTION INTRAMUSCULAR; INTRAVENOUS EVERY 6 HOURS PRN
Status: DISCONTINUED | OUTPATIENT
Start: 2024-11-09 | End: 2024-11-09 | Stop reason: HOSPADM

## 2024-11-09 RX ADMIN — MORPHINE SULFATE 2 MG: 2 INJECTION, SOLUTION INTRAMUSCULAR; INTRAVENOUS at 12:11

## 2024-11-09 RX ADMIN — METRONIDAZOLE 500 MG: 500 TABLET ORAL at 05:32

## 2024-11-09 RX ADMIN — METRONIDAZOLE 500 MG: 500 TABLET ORAL at 22:39

## 2024-11-09 RX ADMIN — ONDANSETRON 4 MG: 2 INJECTION INTRAMUSCULAR; INTRAVENOUS at 09:10

## 2024-11-09 RX ADMIN — POTASSIUM CHLORIDE AND SODIUM CHLORIDE 75 ML/HR: 900; 150 INJECTION, SOLUTION INTRAVENOUS at 16:27

## 2024-11-09 RX ADMIN — SENNOSIDES AND DOCUSATE SODIUM 2 TABLET: 50; 8.6 TABLET ORAL at 22:39

## 2024-11-09 RX ADMIN — FLUOXETINE HYDROCHLORIDE 40 MG: 20 CAPSULE ORAL at 10:23

## 2024-11-09 RX ADMIN — ONDANSETRON 4 MG: 2 INJECTION INTRAMUSCULAR; INTRAVENOUS at 08:33

## 2024-11-09 RX ADMIN — HYDROCODONE BITARTRATE AND ACETAMINOPHEN 1 TABLET: 5; 325 TABLET ORAL at 18:15

## 2024-11-09 RX ADMIN — MORPHINE SULFATE 2 MG: 2 INJECTION, SOLUTION INTRAMUSCULAR; INTRAVENOUS at 16:36

## 2024-11-09 RX ADMIN — METOCLOPRAMIDE 10 MG: 5 INJECTION, SOLUTION INTRAMUSCULAR; INTRAVENOUS at 16:27

## 2024-11-09 RX ADMIN — SENNOSIDES AND DOCUSATE SODIUM 2 TABLET: 50; 8.6 TABLET ORAL at 10:23

## 2024-11-09 RX ADMIN — DEXAMETHASONE SODIUM PHOSPHATE 8 MG: 4 INJECTION INTRA-ARTICULAR; INTRALESIONAL; INTRAMUSCULAR; INTRAVENOUS; SOFT TISSUE at 08:15

## 2024-11-09 RX ADMIN — LIDOCAINE HYDROCHLORIDE 50 MG: 10 INJECTION, SOLUTION EPIDURAL; INFILTRATION; INTRACAUDAL; PERINEURAL at 08:07

## 2024-11-09 RX ADMIN — SODIUM CHLORIDE 2000 MG: 900 INJECTION INTRAVENOUS at 10:23

## 2024-11-09 RX ADMIN — PROPOFOL 300 MG: 10 INJECTION, EMULSION INTRAVENOUS at 08:07

## 2024-11-09 RX ADMIN — METRONIDAZOLE 500 MG: 500 TABLET ORAL at 13:59

## 2024-11-09 RX ADMIN — ROCURONIUM BROMIDE 50 MG: 10 INJECTION INTRAVENOUS at 08:07

## 2024-11-09 RX ADMIN — MORPHINE SULFATE 2 MG: 2 INJECTION, SOLUTION INTRAMUSCULAR; INTRAVENOUS at 21:03

## 2024-11-09 RX ADMIN — SIMETHICONE 80 MG: 80 TABLET, CHEWABLE ORAL at 21:17

## 2024-11-09 RX ADMIN — SIMETHICONE 80 MG: 80 TABLET, CHEWABLE ORAL at 13:09

## 2024-11-09 RX ADMIN — SUGAMMADEX 400 MG: 100 INJECTION, SOLUTION INTRAVENOUS at 08:37

## 2024-11-09 RX ADMIN — SODIUM CHLORIDE, POTASSIUM CHLORIDE, SODIUM LACTATE AND CALCIUM CHLORIDE: 600; 310; 30; 20 INJECTION, SOLUTION INTRAVENOUS at 08:00

## 2024-11-09 RX ADMIN — METOCLOPRAMIDE 10 MG: 5 INJECTION, SOLUTION INTRAMUSCULAR; INTRAVENOUS at 21:03

## 2024-11-09 RX ADMIN — GLUCAGON 0.5 MG: KIT at 08:33

## 2024-11-09 NOTE — PROGRESS NOTES
"Patient Name:  Eder Wills  YOB: 2000  [id number]    Surgery Progress Note    Date of visit: 11/9/2024      Subjective: No significant changes.  Still with some pain although not significantly improved or worsened.  No fevers or chills          Objective:     /98 (BP Location: Left arm, Patient Position: Lying)   Pulse 59   Temp 97.4 °F (36.3 °C) (Temporal)   Resp 16   Ht 157.5 cm (62\")   Wt 84.4 kg (186 lb)   LMP 10/29/2024 (Approximate)   SpO2 98%   BMI 34.02 kg/m²     Intake/Output Summary (Last 24 hours) at 11/9/2024 0746  Last data filed at 11/9/2024 0532  Gross per 24 hour   Intake 1569 ml   Output 1650 ml   Net -81 ml       GEN:   Awake, alert, in no acute distress, resting comfortably in bed   CV:   Regular rate and rhythm  L:  Symmetric expansion, not labored on room air  Abd:  Soft, not distended, some mild diffuse tenderness palpation throughout the upper abdomen  Ext:  No cyanosis, clubbing, or edema    Recent labs that are back at this time have been reviewed.           Assessment/ Plan:    Mrs. Wills is a 23-year-old lady without significant past medical history with choledocholithiasis     # Choledocholithiasis  -GI plans for ERCP today  -Will follow-up results of this.  Possible cholecystectomy tomorrow.  Keep n.p.o. after midnight      Raheem Raza MD  11/9/2024  07:46 EST      "

## 2024-11-09 NOTE — PLAN OF CARE
Problem: Adult Inpatient Plan of Care  Goal: Plan of Care Review  Flowsheets (Taken 11/9/2024 0732)  Progress: no change  Outcome Evaluation: VSS. Adequate I&O. Norco prn x1 for pain. Rested well. NPO for ERCP in am. Possible future lap janell pending ERCP results.   Plan of Care Reviewed With: patient   Goal Outcome Evaluation:  Plan of Care Reviewed With: patient        Progress: no change  Outcome Evaluation: VSS. Adequate I&O. Norco prn x1 for pain. Rested well. NPO for ERCP in am.

## 2024-11-09 NOTE — PROGRESS NOTES
[facility] Medicine Services  PROGRESS NOTE    Patient Name: Eder Wills  : 2000  MRN: [id number]    Date of Admission: 2024  Primary Care Physician: Eva Tovar MD    Subjective   Subjective     CC:  Abdominal Pain/ vomiting    HPI:  Sleepy after ERCP, denies pain, nausea      Objective   Objective     Vital Signs:   Temp:  [96.6 °F (35.9 °C)-97.7 °F (36.5 °C)] 97 °F (36.1 °C)  Heart Rate:  [59-88] 61  Resp:  [14-20] 16  BP: ()/(59-98) 94/64  Flow (L/min) (Oxygen Therapy):  [3] 3     Physical Exam:  Constitutional: No acute distress, sleeping, awakes to voice  HENT: NCAT, mucous membranes moist  Respiratory: Clear to auscultation bilaterally, respiratory effort normal   Cardiovascular: RRR, no murmurs, rubs, or gallops  Gastrointestinal: Positive bowel sounds, soft, mild tenderness RUQ, nondistended  Musculoskeletal: No bilateral ankle edema  Psychiatric: Appropriate affect, cooperative  Neurologic: Oriented x 3, strength symmetric in all extremities, Cranial Nerves grossly intact to confrontation, speech clear  Skin: No rashes      Results Reviewed:  LAB RESULTS:      Lab 24  0524  2240   WBC 8.62 9.78   HEMOGLOBIN 12.3 12.4   HEMATOCRIT 37.6 38.0   PLATELETS 315 337   NEUTROS ABS 5.43 7.46*   IMMATURE GRANS (ABS) 0.02 0.03   LYMPHS ABS 2.20 1.46   MONOS ABS 0.77 0.65   EOS ABS 0.16 0.14   MCV 92.4 92.5   PROCALCITONIN  --  0.12   LACTATE  --  1.1         Lab 24  0951 24  0527 24  2240   SODIUM 139 147* 143   POTASSIUM 4.1 4.4 4.1   CHLORIDE 106 112* 108*   CO2 21.0* 21.0* 21.0*   ANION GAP 12.0 14.0 14.0   BUN 10 9 9   CREATININE 0.68 0.77 0.83   EGFR 127.3 112.7 103.0   GLUCOSE 102* 91 117*   CALCIUM 9.1 9.1 9.1         Lab 24  0951 24  0527 24  2240   TOTAL PROTEIN 6.4 6.8 7.0   ALBUMIN 4.0 3.9 4.2   GLOBULIN 2.4 2.9 2.8   ALT (SGPT) 432* 467* 519*   AST (SGOT) 155* 142* 177*   BILIRUBIN 2.4*  2.9* 3.2*   ALK PHOS 258* 312* 325*   LIPASE  --   --  60                     Brief Urine Lab Results  (Last result in the past 365 days)        Color   Clarity   Blood   Leuk Est   Nitrite   Protein   CREAT   Urine HCG        11/08/24 0113 Yellow   Clear   Negative   Negative   Negative   Negative                   Microbiology Results Abnormal       None            MRI abdomen wo contrast mrcp    Result Date: 11/8/2024  MRI ABDOMEN WO CONTRAST MRCP Date of Exam: 11/8/2024 4:57 AM EST Indication: ? obstructing stone.  RUQ pain, cholelithiasis on CT A/P.  Comparison: Ultrasound right upper quadrant November 8, 2024; CT abdomen pelvis November 8, 2024 Technique: Standard noncontrast MR pulse sequences of the abdomen were obtained. High-resolution T2 MRCP images were acquired and 3-D MIP reconstructions were created for interpretation. Findings: Lung bases appear grossly clear.  Visualized mediastinal structures are unremarkable.  Superficial fat and underlying musculature appear within normal limits.  Bone marrow signal appears grossly unremarkable. The liver appears normal in size and signal.  Liver vasculature appears conventional and patent.  There is no intrahepatic biliary dilatation. The gallbladder is nondistended. There are innumerable stones throughout the gallbladder. There is gallbladder wall thickening and pericholecystic fluid suggestive of acute cholecystitis. Common bile duct measures 8 mm diameter. There are a few small stones in the distal common bile duct The pancreas appears normal signal without solid or cystic mass or adjacent inflammation. The pancreatic duct is nondistended.  There is no evidence of pancreatic divisum. The spleen is normal size and signal.  There is no adrenal mass.  No solid or cystic kidney mass or hydronephrosis.  There is no focal intraperitoneal inflammation or fluid collection.  No evidence of ascites. No lymphadenopathy.     Impression: Impression: 1.Cholelithiasis with  gallbladder wall thickening and pericholecystic fluid suggestive of acute cholecystitis. 2.Choledocholithiasis with a few small stones in the distal common bile duct. Electronically Signed: Dmitriy Spaulding MD  11/8/2024 5:56 AM EST  Workstation ID: [id number]    US Gallbladder    Result Date: 11/8/2024  US GALLBLADDER Date of Exam: 11/8/2024 1:33 AM EST Indication: RUQ pain. Comparison: CT abdomen pelvis dated November 8, 2024 Technique: Grayscale and color Doppler ultrasound evaluation of the right upper quadrant was performed. Findings: The liver is homogeneous. There are no focal liver lesions. Portal venous and hepatic venous flows are normal. There are multiple stones in the gallbladder. There is marked biliary ductal dilatation up to 1.5 cm. Obstructing gallstone would be in the differential. Correlation with MRCP or ERCP may be required. The visualized pancreatic tissue is normal. The right kidney is 9.3 cm in [id number] length and there is no hydronephrosis.     Impression: Impression: 1.Cholelithiasis. 2.Marked biliary ductal dilatation. Correlation with MRCP or ERCP may be required. Electronically Signed: Dmitriy Spaulding MD  11/8/2024 2:28 AM EST  Workstation ID: [id number]    CT Abdomen Pelvis With Contrast    Result Date: 11/8/2024  CT ABDOMEN PELVIS W CONTRAST Date of Exam: 11/8/2024 12:38 AM EST Indication: RUQ pain, jaundice. Comparison: None available. Technique: Axial CT images were obtained of the abdomen and pelvis following the uneventful intravenous administration of 85 mL Isovue-300. Reconstructed coronal and sagittal images were also obtained. Automated exposure control and iterative construction methods were used. Findings: Lung Bases:   The visualized lung bases and lower mediastinal structures are unremarkable. Liver: Liver is normal in size and CT density. No focal lesions. Biliary/Gallbladder:  There are multiple stones in the gallbladder. There is mild stranding around the gallbladder wall.  There is mild intra and proximal extrahepatic biliary ductal dilatation. Spleen: Spleen is normal in size and CT density. Pancreas:  Pancreas is normal. There is no evidence of pancreatic mass or peripancreatic fluid. Kidneys:  Kidneys are normal in size. There are no stones or hydronephrosis. Adrenals:  Adrenal glands are unremarkable. Retroperitoneal/Lymph Nodes/Vasculature:  No retroperitoneal adenopathy is identified. Gastrointestinal/Mesentery:  The bowel loops are non-dilated without wall thickening or mass. The appendix appears within normal limits. No evidence of obstruction. No free air. No mesenteric fluid collections identified. Bladder:  The bladder is normal. Genital:   Unremarkable       Bony Structures:   Visualized bony structures are consistent with the patient's age.     Impression: Impression: Cholelithiasis with mild stranding around the gallbladder wall and mild intra and proximal extrahepatic biliary ductal dilatation. Electronically Signed: Dmitriy Spaulding MD  11/8/2024 1:18 AM EST  Workstation ID: [id number]         Current medications:  Scheduled Meds:cefTRIAXone, 2,000 mg, Intravenous, Q24H  FLUoxetine, 40 mg, Oral, Daily  metroNIDAZOLE, 500 mg, Oral, Q8H  senna-docusate sodium, 2 tablet, Oral, BID      Continuous Infusions:sodium chloride, 75 mL/hr, Last Rate: 75 mL/hr (11/09/24 0532)      PRN Meds:.  acetaminophen    senna-docusate sodium **AND** polyethylene glycol **AND** bisacodyl **AND** bisacodyl    HYDROcodone-acetaminophen    Morphine    ondansetron    [COMPLETED] Insert Peripheral IV **AND** sodium chloride    Assessment & Plan   Assessment & Plan     Active Hospital Problems    Diagnosis  POA    **Biliary obstruction [K83.1]  Yes    Calculus of gallbladder and bile duct with obstruction [K80.71]  Yes    Mood disorder [F39]  Yes      Resolved Hospital Problems   No resolved problems to display.        Brief Hospital Course to date:  Eder Wills is a 23 y.o. female with no pmh with  6 weeks of abdominal/ right sided back pain, diagnosed with cholelithiasis and scheduled for surgical evaluation. Presents 11/7 with worsening abdominal pain associated with n/v. MRCP notes choledocholithiasis. GI and Gen surgery consulted.      Choledocholithiasis  Cholecystitis  -- MRCP/ GB US reviewed  -- LFTs, T Bili trending down  -- ERCP today with Dr. Patrick reveal dilated main bile duct, no stones or filling defects  -- plan lap CCY 11/10 with Dr. Raza  -- clear liquid diet today, NPO after MN  -- pain and nausea control  -- continue rocephin, flagyl  -- bowel regimen  -- s/p IV      Expected Discharge Location and Transportation: home  Expected Discharge   Expected Discharge Date: 11/11/2024; Expected Discharge Time:      VTE Prophylaxis:  Mechanical VTE prophylaxis orders are present.         AM-PAC 6 Clicks Score (PT): 24 (11/09/24 1000)    CODE STATUS:   Code Status and Medical Interventions: CPR (Attempt to Resuscitate); Full Support   Ordered at: 11/08/24 0236     Level Of Support Discussed With:    Patient     Code Status (Patient has no pulse and is not breathing):    CPR (Attempt to Resuscitate)     Medical Interventions (Patient has pulse or is breathing):    Full Support       MARITZA Isaac  11/09/24  Electronically signed by MARITZA Isaac, 11/09/24, 11:54 AM EST.

## 2024-11-09 NOTE — ANESTHESIA PROCEDURE NOTES
Airway  Urgency: elective    Date/Time: 11/9/2024 8:10 AM  Airway not difficult    General Information and Staff    Patient location during procedure: OR  CRNA/CAA: Melissa Erickson CRNA    Indications and Patient Condition  Indications for airway management: airway protection    Preoxygenated: yes  MILS not maintained throughout  Mask difficulty assessment: 1 - vent by mask    Final Airway Details  Final airway type: endotracheal airway      Successful airway: ETT  Cuffed: yes   Successful intubation technique: direct laryngoscopy  Endotracheal tube insertion site: oral  Blade: Boykin  Blade size: 2  ETT size (mm): 7.0  Cormack-Lehane Classification: grade I - full view of glottis  Placement verified by: chest auscultation and capnometry   Measured from: lips  ETT/EBT  to lips (cm): 20  Number of attempts at approach: 1  Assessment: lips, teeth, and gum same as pre-op and atraumatic intubation    Additional Comments  Negative epigastric sounds, Breath sound equal bilaterally with symmetric chest rise and fall

## 2024-11-09 NOTE — ANESTHESIA POSTPROCEDURE EVALUATION
Patient: Eder Wills    Procedure Summary       Date: 11/09/24 Room / Location: [facility] ENDOSCOPY 3 /  [facility]    Anesthesia Start: 0800 Anesthesia Stop: 0901    Procedure: ENDOSCOPIC RETROGRADE CHOLANGIOPANCREATOGRAPHY Diagnosis:     Surgeons: Dick Patrick MD Provider: Pj Willis Jr., MD    Anesthesia Type: general ASA Status: 2            Anesthesia Type: general    Vitals  Vitals Value Taken Time   /78 11/09/24 0857   Temp 97.7 °F (36.5 °C) 11/09/24 0857   Pulse 88 11/09/24 0857   Resp 16 11/09/24 0857   SpO2 100 % 11/09/24 0857           Post Anesthesia Care and Evaluation    Patient location during evaluation: PACU  Patient participation: complete - patient participated  Level of consciousness: awake and alert  Pain score: 0  Pain management: adequate    Airway patency: patent  Anesthetic complications: No anesthetic complications  PONV Status: none  Cardiovascular status: hemodynamically stable and acceptable  Respiratory status: nonlabored ventilation, acceptable and nasal cannula  Hydration status: acceptable

## 2024-11-09 NOTE — PLAN OF CARE
Goal Outcome Evaluation:  Plan of Care Reviewed With: patient, family        Progress: no change  Outcome Evaluation: Pt's pain increased today. Simethicone ordered for possible gas pains. Reglan scheduled; pt had large emesis in the afternoon. Pt has had small BMs today Started on NS with 20KCl at 75ml/hr. Pt has been on clear liquids today and will be NPO after midnight for cholecystectomy tomorrow scheduled for 8am.         Problem: Adult Inpatient Plan of Care  Goal: Absence of Hospital-Acquired Illness or Injury  Outcome: Not Progressing  Intervention: Identify and Manage Fall Risk  Recent Flowsheet Documentation  Taken 11/9/2024 1000 by Beth Medrano RN  Safety Promotion/Fall Prevention:   safety round/check completed   nonskid shoes/slippers when out of bed   assistive device/personal items within reach   activity supervised  Intervention: Prevent and Manage VTE (Venous Thromboembolism) Risk  Recent Flowsheet Documentation  Taken 11/9/2024 1000 by Beth Medrano RN  VTE Prevention/Management:   bilateral   SCDs (sequential compression devices) on     Problem: Adult Inpatient Plan of Care  Goal: Optimal Comfort and Wellbeing  Outcome: Not Progressing     Problem: Pain Acute  Goal: Optimal Pain Control and Function  Outcome: Not Progressing

## 2024-11-10 ENCOUNTER — ANESTHESIA EVENT (OUTPATIENT)
Dept: PERIOP | Facility: HOSPITAL | Age: 24
End: 2024-11-10
Payer: COMMERCIAL

## 2024-11-10 ENCOUNTER — ANESTHESIA (OUTPATIENT)
Dept: PERIOP | Facility: HOSPITAL | Age: 24
End: 2024-11-10
Payer: COMMERCIAL

## 2024-11-10 LAB
ALBUMIN SERPL-MCNC: 4.2 G/DL (ref 3.5–5.2)
ALBUMIN/GLOB SERPL: 1.9 G/DL
ALP SERPL-CCNC: 294 U/L (ref 39–117)
ALT SERPL W P-5'-P-CCNC: 351 U/L (ref 1–33)
ANION GAP SERPL CALCULATED.3IONS-SCNC: 16 MMOL/L (ref 5–15)
AST SERPL-CCNC: 86 U/L (ref 1–32)
BASOPHILS # BLD AUTO: 0.03 10*3/MM3 (ref 0–0.2)
BASOPHILS NFR BLD AUTO: 0.2 % (ref 0–1.5)
BILIRUB SERPL-MCNC: 2.4 MG/DL (ref 0–1.2)
BUN SERPL-MCNC: 10 MG/DL (ref 6–20)
BUN/CREAT SERPL: 14.1 (ref 7–25)
CALCIUM SPEC-SCNC: 9.4 MG/DL (ref 8.6–10.5)
CHLORIDE SERPL-SCNC: 97 MMOL/L (ref 98–107)
CO2 SERPL-SCNC: 23 MMOL/L (ref 22–29)
CREAT SERPL-MCNC: 0.71 MG/DL (ref 0.57–1)
CRP SERPL-MCNC: 1.82 MG/DL (ref 0–0.5)
DEPRECATED RDW RBC AUTO: 44.8 FL (ref 37–54)
EGFRCR SERPLBLD CKD-EPI 2021: 124.2 ML/MIN/1.73
EOSINOPHIL # BLD AUTO: 0 10*3/MM3 (ref 0–0.4)
EOSINOPHIL NFR BLD AUTO: 0 % (ref 0.3–6.2)
ERYTHROCYTE [DISTWIDTH] IN BLOOD BY AUTOMATED COUNT: 13.2 % (ref 12.3–15.4)
GLOBULIN UR ELPH-MCNC: 2.2 GM/DL
GLUCOSE SERPL-MCNC: 166 MG/DL (ref 65–99)
HCT VFR BLD AUTO: 47.6 % (ref 34–46.6)
HGB BLD-MCNC: 15.8 G/DL (ref 12–15.9)
IMM GRANULOCYTES # BLD AUTO: 0.1 10*3/MM3 (ref 0–0.05)
IMM GRANULOCYTES NFR BLD AUTO: 0.6 % (ref 0–0.5)
INR PPP: 1.02 (ref 0.89–1.12)
LIPASE SERPL-CCNC: 2820 U/L (ref 13–60)
LYMPHOCYTES # BLD AUTO: 0.76 10*3/MM3 (ref 0.7–3.1)
LYMPHOCYTES NFR BLD AUTO: 4.2 % (ref 19.6–45.3)
MCH RBC QN AUTO: 30.4 PG (ref 26.6–33)
MCHC RBC AUTO-ENTMCNC: 33.2 G/DL (ref 31.5–35.7)
MCV RBC AUTO: 91.5 FL (ref 79–97)
MONOCYTES # BLD AUTO: 1.39 10*3/MM3 (ref 0.1–0.9)
MONOCYTES NFR BLD AUTO: 7.7 % (ref 5–12)
NEUTROPHILS NFR BLD AUTO: 15.84 10*3/MM3 (ref 1.7–7)
NEUTROPHILS NFR BLD AUTO: 87.3 % (ref 42.7–76)
NRBC BLD AUTO-RTO: 0 /100 WBC (ref 0–0.2)
PLATELET # BLD AUTO: 450 10*3/MM3 (ref 140–450)
PMV BLD AUTO: 11.2 FL (ref 6–12)
POTASSIUM SERPL-SCNC: 4.7 MMOL/L (ref 3.5–5.2)
PROCALCITONIN SERPL-MCNC: 0.04 NG/ML (ref 0–0.25)
PROT SERPL-MCNC: 6.4 G/DL (ref 6–8.5)
PROTHROMBIN TIME: 13.5 SECONDS (ref 12.2–14.5)
RBC # BLD AUTO: 5.2 10*6/MM3 (ref 3.77–5.28)
SODIUM SERPL-SCNC: 136 MMOL/L (ref 136–145)
WBC NRBC COR # BLD AUTO: 18.12 10*3/MM3 (ref 3.4–10.8)

## 2024-11-10 PROCEDURE — 0FT44ZZ RESECTION OF GALLBLADDER, PERCUTANEOUS ENDOSCOPIC APPROACH: ICD-10-PCS | Performed by: SURGERY

## 2024-11-10 PROCEDURE — 25010000002 DEXAMETHASONE PER 1 MG: Performed by: NURSE ANESTHETIST, CERTIFIED REGISTERED

## 2024-11-10 PROCEDURE — 25010000002 SODIUM CHLORIDE 0.9 % WITH KCL 20 MEQ 20-0.9 MEQ/L-% SOLUTION: Performed by: NURSE PRACTITIONER

## 2024-11-10 PROCEDURE — 80053 COMPREHEN METABOLIC PANEL: CPT | Performed by: INTERNAL MEDICINE

## 2024-11-10 PROCEDURE — 86140 C-REACTIVE PROTEIN: CPT | Performed by: NURSE PRACTITIONER

## 2024-11-10 PROCEDURE — 25010000002 ONDANSETRON PER 1 MG: Performed by: INTERNAL MEDICINE

## 2024-11-10 PROCEDURE — 25010000002 METOCLOPRAMIDE PER 10 MG: Performed by: SURGERY

## 2024-11-10 PROCEDURE — 25010000002 SUGAMMADEX 200 MG/2ML SOLUTION: Performed by: NURSE ANESTHETIST, CERTIFIED REGISTERED

## 2024-11-10 PROCEDURE — 25010000002 MORPHINE PER 10 MG: Performed by: INTERNAL MEDICINE

## 2024-11-10 PROCEDURE — 25010000002 LIDOCAINE PF 1% 1 % SOLUTION: Performed by: NURSE ANESTHETIST, CERTIFIED REGISTERED

## 2024-11-10 PROCEDURE — 25010000002 METRONIDAZOLE 500 MG/100ML SOLUTION: Performed by: NURSE PRACTITIONER

## 2024-11-10 PROCEDURE — 25010000002 PROPOFOL 10 MG/ML EMULSION: Performed by: NURSE ANESTHETIST, CERTIFIED REGISTERED

## 2024-11-10 PROCEDURE — 25010000002 ONDANSETRON PER 1 MG: Performed by: NURSE ANESTHETIST, CERTIFIED REGISTERED

## 2024-11-10 PROCEDURE — 83690 ASSAY OF LIPASE: CPT | Performed by: SURGERY

## 2024-11-10 PROCEDURE — 99232 SBSQ HOSP IP/OBS MODERATE 35: CPT | Performed by: INTERNAL MEDICINE

## 2024-11-10 PROCEDURE — 25010000002 METOCLOPRAMIDE PER 10 MG: Performed by: NURSE PRACTITIONER

## 2024-11-10 PROCEDURE — 99232 SBSQ HOSP IP/OBS MODERATE 35: CPT | Performed by: NURSE PRACTITIONER

## 2024-11-10 PROCEDURE — 25810000003 SODIUM CHLORIDE PER 500 ML: Performed by: SURGERY

## 2024-11-10 PROCEDURE — 88304 TISSUE EXAM BY PATHOLOGIST: CPT | Performed by: SURGERY

## 2024-11-10 PROCEDURE — 85610 PROTHROMBIN TIME: CPT | Performed by: INTERNAL MEDICINE

## 2024-11-10 PROCEDURE — 25810000003 LACTATED RINGERS PER 1000 ML: Performed by: SURGERY

## 2024-11-10 PROCEDURE — 25010000002 FENTANYL CITRATE (PF) 100 MCG/2ML SOLUTION: Performed by: NURSE ANESTHETIST, CERTIFIED REGISTERED

## 2024-11-10 PROCEDURE — 84145 PROCALCITONIN (PCT): CPT | Performed by: NURSE PRACTITIONER

## 2024-11-10 PROCEDURE — 85025 COMPLETE CBC W/AUTO DIFF WBC: CPT | Performed by: HOSPITALIST

## 2024-11-10 PROCEDURE — 25810000003 LACTATED RINGERS PER 1000 ML: Performed by: NURSE ANESTHETIST, CERTIFIED REGISTERED

## 2024-11-10 PROCEDURE — 25010000002 HYDROMORPHONE PER 4 MG: Performed by: SURGERY

## 2024-11-10 DEVICE — LIGAMAX 5 MM ENDOSCOPIC MULTIPLE CLIP APPLIER
Type: IMPLANTABLE DEVICE | Site: ABDOMEN | Status: FUNCTIONAL
Brand: LIGAMAX

## 2024-11-10 RX ORDER — ONDANSETRON 2 MG/ML
4 INJECTION INTRAMUSCULAR; INTRAVENOUS EVERY 6 HOURS PRN
Status: DISCONTINUED | OUTPATIENT
Start: 2024-11-10 | End: 2024-11-15 | Stop reason: HOSPADM

## 2024-11-10 RX ORDER — HYDROMORPHONE HYDROCHLORIDE 1 MG/ML
0.5 INJECTION, SOLUTION INTRAMUSCULAR; INTRAVENOUS; SUBCUTANEOUS
Status: DISCONTINUED | OUTPATIENT
Start: 2024-11-10 | End: 2024-11-12

## 2024-11-10 RX ORDER — FENTANYL CITRATE 50 UG/ML
INJECTION, SOLUTION INTRAMUSCULAR; INTRAVENOUS AS NEEDED
Status: DISCONTINUED | OUTPATIENT
Start: 2024-11-10 | End: 2024-11-10 | Stop reason: SURG

## 2024-11-10 RX ORDER — DEXAMETHASONE SODIUM PHOSPHATE 4 MG/ML
INJECTION, SOLUTION INTRA-ARTICULAR; INTRALESIONAL; INTRAMUSCULAR; INTRAVENOUS; SOFT TISSUE AS NEEDED
Status: DISCONTINUED | OUTPATIENT
Start: 2024-11-10 | End: 2024-11-10 | Stop reason: SURG

## 2024-11-10 RX ORDER — PANTOPRAZOLE SODIUM 40 MG/10ML
40 INJECTION, POWDER, LYOPHILIZED, FOR SOLUTION INTRAVENOUS
Status: DISCONTINUED | OUTPATIENT
Start: 2024-11-10 | End: 2024-11-13

## 2024-11-10 RX ORDER — SODIUM CHLORIDE, SODIUM LACTATE, POTASSIUM CHLORIDE, CALCIUM CHLORIDE 600; 310; 30; 20 MG/100ML; MG/100ML; MG/100ML; MG/100ML
100 INJECTION, SOLUTION INTRAVENOUS CONTINUOUS
Status: DISCONTINUED | OUTPATIENT
Start: 2024-11-10 | End: 2024-11-13

## 2024-11-10 RX ORDER — NALOXONE HCL 0.4 MG/ML
0.4 VIAL (ML) INJECTION
Status: DISCONTINUED | OUTPATIENT
Start: 2024-11-10 | End: 2024-11-12

## 2024-11-10 RX ORDER — BUPIVACAINE HYDROCHLORIDE AND EPINEPHRINE 2.5; 5 MG/ML; UG/ML
INJECTION, SOLUTION EPIDURAL; INFILTRATION; INTRACAUDAL; PERINEURAL AS NEEDED
Status: DISCONTINUED | OUTPATIENT
Start: 2024-11-10 | End: 2024-11-10 | Stop reason: HOSPADM

## 2024-11-10 RX ORDER — DOCUSATE SODIUM 100 MG/1
100 CAPSULE, LIQUID FILLED ORAL 2 TIMES DAILY
Status: DISCONTINUED | OUTPATIENT
Start: 2024-11-10 | End: 2024-11-14

## 2024-11-10 RX ORDER — NALOXONE HCL 0.4 MG/ML
0.1 VIAL (ML) INJECTION
Status: DISCONTINUED | OUTPATIENT
Start: 2024-11-10 | End: 2024-11-10

## 2024-11-10 RX ORDER — HYDROMORPHONE HYDROCHLORIDE 2 MG/ML
0.5 INJECTION, SOLUTION INTRAMUSCULAR; INTRAVENOUS; SUBCUTANEOUS
Status: DISCONTINUED | OUTPATIENT
Start: 2024-11-10 | End: 2024-11-10

## 2024-11-10 RX ORDER — ONDANSETRON 4 MG/1
4 TABLET, ORALLY DISINTEGRATING ORAL EVERY 6 HOURS PRN
Status: DISCONTINUED | OUTPATIENT
Start: 2024-11-10 | End: 2024-11-15 | Stop reason: HOSPADM

## 2024-11-10 RX ORDER — PROMETHAZINE HYDROCHLORIDE 12.5 MG/1
12.5 TABLET ORAL EVERY 6 HOURS PRN
Status: DISCONTINUED | OUTPATIENT
Start: 2024-11-10 | End: 2024-11-15 | Stop reason: HOSPADM

## 2024-11-10 RX ORDER — LIDOCAINE HYDROCHLORIDE 10 MG/ML
INJECTION, SOLUTION EPIDURAL; INFILTRATION; INTRACAUDAL; PERINEURAL AS NEEDED
Status: DISCONTINUED | OUTPATIENT
Start: 2024-11-10 | End: 2024-11-10 | Stop reason: SURG

## 2024-11-10 RX ORDER — ONDANSETRON 2 MG/ML
INJECTION INTRAMUSCULAR; INTRAVENOUS AS NEEDED
Status: DISCONTINUED | OUTPATIENT
Start: 2024-11-10 | End: 2024-11-10 | Stop reason: SURG

## 2024-11-10 RX ORDER — SODIUM CHLORIDE, SODIUM LACTATE, POTASSIUM CHLORIDE, CALCIUM CHLORIDE 600; 310; 30; 20 MG/100ML; MG/100ML; MG/100ML; MG/100ML
INJECTION, SOLUTION INTRAVENOUS CONTINUOUS PRN
Status: DISCONTINUED | OUTPATIENT
Start: 2024-11-10 | End: 2024-11-10 | Stop reason: SURG

## 2024-11-10 RX ORDER — INDOMETHACIN 100 MG
SUPPOSITORY, RECTAL RECTAL AS NEEDED
Status: DISCONTINUED | OUTPATIENT
Start: 2024-11-09 | End: 2024-11-10 | Stop reason: HOSPADM

## 2024-11-10 RX ORDER — FAMOTIDINE 10 MG/ML
20 INJECTION, SOLUTION INTRAVENOUS ONCE
Status: COMPLETED | OUTPATIENT
Start: 2024-11-10 | End: 2024-11-10

## 2024-11-10 RX ORDER — BUPIVACAINE HCL/0.9 % NACL/PF 0.125 %
PLASTIC BAG, INJECTION (ML) EPIDURAL AS NEEDED
Status: DISCONTINUED | OUTPATIENT
Start: 2024-11-10 | End: 2024-11-10 | Stop reason: SURG

## 2024-11-10 RX ORDER — DROPERIDOL 2.5 MG/ML
0.62 INJECTION, SOLUTION INTRAMUSCULAR; INTRAVENOUS ONCE AS NEEDED
Status: DISCONTINUED | OUTPATIENT
Start: 2024-11-10 | End: 2024-11-10 | Stop reason: HOSPADM

## 2024-11-10 RX ORDER — FENTANYL CITRATE 50 UG/ML
50 INJECTION, SOLUTION INTRAMUSCULAR; INTRAVENOUS
Status: DISCONTINUED | OUTPATIENT
Start: 2024-11-10 | End: 2024-11-10 | Stop reason: HOSPADM

## 2024-11-10 RX ORDER — BISACODYL 5 MG/1
10 TABLET, DELAYED RELEASE ORAL DAILY
Status: DISCONTINUED | OUTPATIENT
Start: 2024-11-10 | End: 2024-11-14

## 2024-11-10 RX ORDER — DEXMEDETOMIDINE HYDROCHLORIDE 100 UG/ML
INJECTION, SOLUTION INTRAVENOUS AS NEEDED
Status: DISCONTINUED | OUTPATIENT
Start: 2024-11-10 | End: 2024-11-10 | Stop reason: SURG

## 2024-11-10 RX ORDER — METRONIDAZOLE 500 MG/100ML
500 INJECTION, SOLUTION INTRAVENOUS EVERY 8 HOURS
Status: COMPLETED | OUTPATIENT
Start: 2024-11-10 | End: 2024-11-11

## 2024-11-10 RX ORDER — PROPOFOL 10 MG/ML
VIAL (ML) INTRAVENOUS AS NEEDED
Status: DISCONTINUED | OUTPATIENT
Start: 2024-11-10 | End: 2024-11-10 | Stop reason: SURG

## 2024-11-10 RX ORDER — HEPARIN SODIUM 5000 [USP'U]/ML
5000 INJECTION, SOLUTION INTRAVENOUS; SUBCUTANEOUS EVERY 8 HOURS SCHEDULED
Status: DISCONTINUED | OUTPATIENT
Start: 2024-11-11 | End: 2024-11-15 | Stop reason: HOSPADM

## 2024-11-10 RX ORDER — PANTOPRAZOLE SODIUM 40 MG/1
40 TABLET, DELAYED RELEASE ORAL
Status: DISCONTINUED | OUTPATIENT
Start: 2024-11-10 | End: 2024-11-11

## 2024-11-10 RX ORDER — HYDROMORPHONE HYDROCHLORIDE 1 MG/ML
0.5 INJECTION, SOLUTION INTRAMUSCULAR; INTRAVENOUS; SUBCUTANEOUS
Status: DISCONTINUED | OUTPATIENT
Start: 2024-11-10 | End: 2024-11-10 | Stop reason: HOSPADM

## 2024-11-10 RX ORDER — SODIUM CHLORIDE 9 MG/ML
INJECTION, SOLUTION INTRAVENOUS AS NEEDED
Status: DISCONTINUED | OUTPATIENT
Start: 2024-11-10 | End: 2024-11-10 | Stop reason: HOSPADM

## 2024-11-10 RX ORDER — ROCURONIUM BROMIDE 10 MG/ML
INJECTION, SOLUTION INTRAVENOUS AS NEEDED
Status: DISCONTINUED | OUTPATIENT
Start: 2024-11-10 | End: 2024-11-10 | Stop reason: SURG

## 2024-11-10 RX ORDER — OXYCODONE AND ACETAMINOPHEN 5; 325 MG/1; MG/1
2 TABLET ORAL EVERY 4 HOURS PRN
Status: DISCONTINUED | OUTPATIENT
Start: 2024-11-10 | End: 2024-11-15 | Stop reason: HOSPADM

## 2024-11-10 RX ORDER — FAMOTIDINE 10 MG/ML
INJECTION, SOLUTION INTRAVENOUS
Status: COMPLETED
Start: 2024-11-10 | End: 2024-11-10

## 2024-11-10 RX ORDER — SODIUM CHLORIDE, SODIUM LACTATE, POTASSIUM CHLORIDE, CALCIUM CHLORIDE 600; 310; 30; 20 MG/100ML; MG/100ML; MG/100ML; MG/100ML
125 INJECTION, SOLUTION INTRAVENOUS CONTINUOUS
Status: DISCONTINUED | OUTPATIENT
Start: 2024-11-10 | End: 2024-11-10

## 2024-11-10 RX ADMIN — DEXMEDETOMIDINE HYDROCHLORIDE 12 MCG: 100 INJECTION, SOLUTION INTRAVENOUS at 08:19

## 2024-11-10 RX ADMIN — HYDROMORPHONE HYDROCHLORIDE 0.5 MG: 1 INJECTION, SOLUTION INTRAMUSCULAR; INTRAVENOUS; SUBCUTANEOUS at 13:15

## 2024-11-10 RX ADMIN — FAMOTIDINE 20 MG: 10 INJECTION, SOLUTION INTRAVENOUS at 07:46

## 2024-11-10 RX ADMIN — PANTOPRAZOLE SODIUM 40 MG: 40 INJECTION, POWDER, FOR SOLUTION INTRAVENOUS at 15:25

## 2024-11-10 RX ADMIN — METOCLOPRAMIDE 10 MG: 5 INJECTION, SOLUTION INTRAMUSCULAR; INTRAVENOUS at 15:25

## 2024-11-10 RX ADMIN — SODIUM CHLORIDE, SODIUM LACTATE, POTASSIUM CHLORIDE, CALCIUM CHLORIDE 150 ML/HR: 20; 30; 600; 310 INJECTION, SOLUTION INTRAVENOUS at 12:59

## 2024-11-10 RX ADMIN — METRONIDAZOLE 500 MG: 500 INJECTION, SOLUTION INTRAVENOUS at 15:24

## 2024-11-10 RX ADMIN — MORPHINE SULFATE 2 MG: 2 INJECTION, SOLUTION INTRAMUSCULAR; INTRAVENOUS at 02:18

## 2024-11-10 RX ADMIN — METOCLOPRAMIDE 10 MG: 5 INJECTION, SOLUTION INTRAMUSCULAR; INTRAVENOUS at 20:27

## 2024-11-10 RX ADMIN — ONDANSETRON 4 MG: 2 INJECTION INTRAMUSCULAR; INTRAVENOUS at 02:18

## 2024-11-10 RX ADMIN — PROPOFOL 200 MG: 10 INJECTION, EMULSION INTRAVENOUS at 08:06

## 2024-11-10 RX ADMIN — Medication 500 MG: at 22:06

## 2024-11-10 RX ADMIN — MORPHINE SULFATE 2 MG: 2 INJECTION, SOLUTION INTRAMUSCULAR; INTRAVENOUS at 06:22

## 2024-11-10 RX ADMIN — POTASSIUM CHLORIDE AND SODIUM CHLORIDE 75 ML/HR: 900; 150 INJECTION, SOLUTION INTRAVENOUS at 03:49

## 2024-11-10 RX ADMIN — DEXMEDETOMIDINE HYDROCHLORIDE 12 MCG: 100 INJECTION, SOLUTION INTRAVENOUS at 08:31

## 2024-11-10 RX ADMIN — HYDROMORPHONE HYDROCHLORIDE 0.5 MG: 1 INJECTION, SOLUTION INTRAMUSCULAR; INTRAVENOUS; SUBCUTANEOUS at 19:34

## 2024-11-10 RX ADMIN — SODIUM CHLORIDE 2000 MG: 900 INJECTION INTRAVENOUS at 08:11

## 2024-11-10 RX ADMIN — DEXMEDETOMIDINE HYDROCHLORIDE 8 MCG: 100 INJECTION, SOLUTION INTRAVENOUS at 08:57

## 2024-11-10 RX ADMIN — LIDOCAINE HYDROCHLORIDE 50 MG: 10 INJECTION, SOLUTION EPIDURAL; INFILTRATION; INTRACAUDAL; PERINEURAL at 08:06

## 2024-11-10 RX ADMIN — SODIUM CHLORIDE, POTASSIUM CHLORIDE, SODIUM LACTATE AND CALCIUM CHLORIDE: 600; 310; 30; 20 INJECTION, SOLUTION INTRAVENOUS at 08:48

## 2024-11-10 RX ADMIN — ROCURONIUM BROMIDE 50 MG: 10 INJECTION INTRAVENOUS at 08:06

## 2024-11-10 RX ADMIN — FENTANYL CITRATE 100 MCG: 50 INJECTION, SOLUTION INTRAMUSCULAR; INTRAVENOUS at 08:06

## 2024-11-10 RX ADMIN — SODIUM CHLORIDE, SODIUM LACTATE, POTASSIUM CHLORIDE, CALCIUM CHLORIDE 150 ML/HR: 20; 30; 600; 310 INJECTION, SOLUTION INTRAVENOUS at 19:37

## 2024-11-10 RX ADMIN — HYDROMORPHONE HYDROCHLORIDE 0.5 MG: 1 INJECTION, SOLUTION INTRAMUSCULAR; INTRAVENOUS; SUBCUTANEOUS at 15:26

## 2024-11-10 RX ADMIN — Medication 200 MCG: at 08:22

## 2024-11-10 RX ADMIN — HYDROMORPHONE HYDROCHLORIDE 0.5 MG: 1 INJECTION, SOLUTION INTRAMUSCULAR; INTRAVENOUS; SUBCUTANEOUS at 22:37

## 2024-11-10 RX ADMIN — HYDROCODONE BITARTRATE AND ACETAMINOPHEN 1 TABLET: 5; 325 TABLET ORAL at 00:02

## 2024-11-10 RX ADMIN — SODIUM CHLORIDE, SODIUM LACTATE, POTASSIUM CHLORIDE, CALCIUM CHLORIDE 150 ML/HR: 20; 30; 600; 310 INJECTION, SOLUTION INTRAVENOUS at 10:15

## 2024-11-10 RX ADMIN — HYDROMORPHONE HYDROCHLORIDE 0.5 MG: 1 INJECTION, SOLUTION INTRAMUSCULAR; INTRAVENOUS; SUBCUTANEOUS at 17:13

## 2024-11-10 RX ADMIN — FENTANYL CITRATE 100 MCG: 50 INJECTION, SOLUTION INTRAMUSCULAR; INTRAVENOUS at 09:10

## 2024-11-10 RX ADMIN — DEXAMETHASONE SODIUM PHOSPHATE 8 MG: 4 INJECTION INTRA-ARTICULAR; INTRALESIONAL; INTRAMUSCULAR; INTRAVENOUS; SOFT TISSUE at 08:11

## 2024-11-10 RX ADMIN — HYDROMORPHONE HYDROCHLORIDE 0.5 MG: 1 INJECTION, SOLUTION INTRAMUSCULAR; INTRAVENOUS; SUBCUTANEOUS at 10:39

## 2024-11-10 RX ADMIN — METRONIDAZOLE 500 MG: 500 INJECTION, SOLUTION INTRAVENOUS at 21:59

## 2024-11-10 RX ADMIN — ONDANSETRON 4 MG: 2 INJECTION INTRAMUSCULAR; INTRAVENOUS at 09:03

## 2024-11-10 RX ADMIN — SODIUM CHLORIDE, POTASSIUM CHLORIDE, SODIUM LACTATE AND CALCIUM CHLORIDE: 600; 310; 30; 20 INJECTION, SOLUTION INTRAVENOUS at 08:01

## 2024-11-10 RX ADMIN — SUGAMMADEX 200 MG: 100 INJECTION, SOLUTION INTRAVENOUS at 09:09

## 2024-11-10 RX ADMIN — METOCLOPRAMIDE 10 MG: 5 INJECTION, SOLUTION INTRAMUSCULAR; INTRAVENOUS at 03:49

## 2024-11-10 NOTE — PROGRESS NOTES
[facility] Medicine Services  PROGRESS NOTE    Patient Name: Eder Wills  : 2000  MRN: [id number]    Date of Admission: 2024  Primary Care Physician: Eva Tovar MD    Subjective   Subjective     CC:  F/u abdominal pain, vomiting    HPI:  Patient resting in bed, sleeping, awakes to voice.  Has returned from cholecystectomy.  On supplemental O2.  Says she is having some pain.  Family at bedside.  Discussed plan to keep patient n.p.o. for now given lab findings concerning for pancreatitis.      Objective   Objective     Vital Signs:   Temp:  [96.8 °F (36 °C)-98.2 °F (36.8 °C)] 98.2 °F (36.8 °C)  Heart Rate:  [] 101  Resp:  [16-21] 21  BP: ()/(59-88) 129/87  Flow (L/min) (Oxygen Therapy):  [3] 3     Physical Exam:  Constitutional: No acute distress, sleeping, awakes to voice  HENT: NCAT, mucous membranes moist  Respiratory: Diminished to auscultation at bases bilaterally, respiratory effort normal, 2L O2  Cardiovascular: RRR, no murmurs, rubs, or gallops  Gastrointestinal: Absent bowel sounds, rounded, lap-choley sites CDI, BRUNO drain with sanguinous drainage  Musculoskeletal: No bilateral ankle edema  Psychiatric: Appropriate affect, cooperative  Neurologic: Oriented x 3, moves all extremities, speech clear  Skin: No rashes      Results Reviewed:  LAB RESULTS:      Lab 11/10/24  0522 11/08/24  0527 24  2240   WBC 18.12* 8.62 9.78   HEMOGLOBIN 15.8 12.3 12.4   HEMATOCRIT 47.6* 37.6 38.0   PLATELETS 450 315 337   NEUTROS ABS 15.84* 5.43 7.46*   IMMATURE GRANS (ABS) 0.10* 0.02 0.03   LYMPHS ABS 0.76 2.20 1.46   MONOS ABS 1.39* 0.77 0.65   EOS ABS 0.00 0.16 0.14   MCV 91.5 92.4 92.5   PROCALCITONIN  --   --  0.12   LACTATE  --   --  1.1   PROTIME 13.5  --   --          Lab 11/10/24  0522 24  0951 24  0527 24  2240   SODIUM 136 139 147* 143   POTASSIUM 4.7 4.1 4.4 4.1   CHLORIDE 97* 106 112* 108*   CO2 23.0 21.0* 21.0* 21.0*   ANION  GAP 16.0* 12.0 14.0 14.0   BUN 10 10 9 9   CREATININE 0.71 0.68 0.77 0.83   EGFR 124.2 127.3 112.7 103.0   GLUCOSE 166* 102* 91 117*   CALCIUM 9.4 9.1 9.1 9.1         Lab 11/10/24  0522 11/09/24  0951 11/08/24  0527 11/07/24  2240   TOTAL PROTEIN 6.4 6.4 6.8 7.0   ALBUMIN 4.2 4.0 3.9 4.2   GLOBULIN 2.2 2.4 2.9 2.8   ALT (SGPT) 351* 432* 467* 519*   AST (SGOT) 86* 155* 142* 177*   BILIRUBIN 2.4* 2.4* 2.9* 3.2*   ALK PHOS 294* 258* 312* 325*   LIPASE 2,820*  --   --  60         Lab 11/10/24  0522   PROTIME 13.5   INR 1.02                 Brief Urine Lab Results  (Last result in the past 365 days)        Color   Clarity   Blood   Leuk Est   Nitrite   Protein   CREAT   Urine HCG        11/08/24 0113 Yellow   Clear   Negative   Negative   Negative   Negative                   Microbiology Results Abnormal       None            No radiology results from the last 24 hrs        Current medications:  Scheduled Meds:cefTRIAXone, 2,000 mg, Intravenous, Q24H  famotidine, , ,   FLUoxetine, 40 mg, Oral, Daily  metoclopramide, 10 mg, Intravenous, Q6H  metroNIDAZOLE, 500 mg, Oral, Q8H  senna-docusate sodium, 2 tablet, Oral, BID      Continuous Infusions:lactated ringers, 125 mL/hr      PRN Meds:.  acetaminophen    senna-docusate sodium **AND** polyethylene glycol **AND** bisacodyl **AND** bisacodyl    famotidine    HYDROcodone-acetaminophen    Morphine    ondansetron    simethicone    [COMPLETED] Insert Peripheral IV **AND** sodium chloride    Assessment & Plan   Assessment & Plan     Active Hospital Problems    Diagnosis  POA    **Biliary obstruction [K83.1]  Yes    Calculus of gallbladder and bile duct with obstruction [K80.71]  Yes    Mood disorder [F39]  Yes      Resolved Hospital Problems   No resolved problems to display.        Brief Hospital Course to date:  Eder Wills is a 23 y.o. female with no PMH, who was recently diagnosed with cholelithiasis after 6 weeks of abdominal/ right sided back pain and was scheduled for  surgical evaluation. However, patient presented to [facility] ED 11/7 for worsening abdominal pain associated with N/V. MRCP notes choledocholithiasis. GI and Gen surgery consulted.     This patient's problems and plans were partially entered by my partner and updated as appropriate by me 11/10/24.     Choledocholithiasis  Cholecystitis  -- MRCP/ GB US reviewed  -- LFTs, T Bili trending down  -- ERCP 11/9 with Dr. Patrick revealed dilated main bile duct, no stones or filling defects  -- lap CCY 11/10 with Dr. Raza  -- pain and nausea control  -- continue Rocephin and change Flagyl to IV today, discussed with pharmacy (IV Flagyl shortage and pt now NPO)  --start Zosyn x 2 days starting 11/11 to complete 5 days abx  -- bowel regimen    Post ERCP Pancreatitis  -- elevated lipase 2,820 this morning, s/p ERCP 11/9  -- WBCs to 18.12 with left shift, , BP stable  -- Procal negative, elevated CRP 1.82  -- IV abx as above  -- IV LR @ 150 ml/hr  -- Strict NPO   -- AM lipase, CMP, CBC w/ diff, trend CRP      Expected Discharge Location and Transportation: Home  Expected Discharge   Expected Discharge Date: 11/12/2024; Expected Discharge Time:      VTE Prophylaxis:  Mechanical VTE prophylaxis orders are present.         AM-PAC 6 Clicks Score (PT): 24 (11/09/24 2045)    CODE STATUS:   Code Status and Medical Interventions: CPR (Attempt to Resuscitate); Full Support   Ordered at: 11/08/24 0236     Level Of Support Discussed With:    Patient     Code Status (Patient has no pulse and is not breathing):    CPR (Attempt to Resuscitate)     Medical Interventions (Patient has pulse or is breathing):    Full Support       Latasha Nance, APRN  11/10/24

## 2024-11-10 NOTE — ANESTHESIA POSTPROCEDURE EVALUATION
Patient: Eder Wills    Procedure Summary       Date: 11/10/24 Room / Location:  [facility] OR [facility] /  [facility]    Anesthesia Start: 0801 Anesthesia Stop: 0920    Procedure: CHOLECYSTECTOMY LAPAROSCOPIC (Abdomen) Diagnosis:     Surgeons: Raheem Raza MD Provider: Lam Hale MD    Anesthesia Type: general ASA Status: 2            Anesthesia Type: general    Vitals  No vitals data found for the desired time range.          Post Anesthesia Care and Evaluation    Patient location during evaluation: PACU  Patient participation: complete - patient participated  Level of consciousness: awake and alert  Pain management: adequate    Airway patency: patent  Anesthetic complications: No anesthetic complications  PONV Status: none  Cardiovascular status: hemodynamically stable and acceptable  Respiratory status: nonlabored ventilation, acceptable and nasal cannula  Hydration status: acceptable

## 2024-11-10 NOTE — PROGRESS NOTES
"GI Daily Progress Note  Subjective:    Chief Complaint:  follow up abdominal pain    Patient with abdominal pain overnight that reportedly was similar to presenting pain.  She is s/p CCY this morning and is resting and somewhat drowsy this morning.  She reports abdominal pain with some nausea.  Family at bedside.    Objective:    /90 (BP Location: Left arm, Patient Position: Lying)   Pulse 98   Temp 97.4 °F (36.3 °C) (Temporal)   Resp 17   Ht 157.5 cm (62\")   Wt 84.4 kg (186 lb)   LMP 10/29/2024 (Approximate)   SpO2 98%   BMI 34.02 kg/m²     Physical Exam   General: Patient awake, alert and cooperative   Eyes: Normal lids and lashes,    Skin: Warm and dry, not jaundiced   Cardiovascular: Regular rate,  well-perfused extremities   Pulm: Equal expansion bilaterally, no increased WOB   Abdomen: mildly distended post CCY;               Neuro: Somewhat drwosy    Lab  Lab Results   Component Value Date    WBC 18.12 (H) 11/10/2024    HGB 15.8 11/10/2024    HGB 12.3 11/08/2024    HGB 12.4 11/07/2024    MCV 91.5 11/10/2024     11/10/2024    INR 1.02 11/10/2024       Lab Results   Component Value Date    GLUCOSE 166 (H) 11/10/2024    BUN 10 11/10/2024    CREATININE 0.71 11/10/2024    BCR 14.1 11/10/2024     11/10/2024    K 4.7 11/10/2024    CO2 23.0 11/10/2024    CALCIUM 9.4 11/10/2024    ALBUMIN 4.2 11/10/2024    ALKPHOS 294 (H) 11/10/2024    BILITOT 2.4 (H) 11/10/2024     (H) 11/10/2024    AST 86 (H) 11/10/2024       Assessment:  Choledocholithiasis and Cholelithiasis  Elevated LFTS and Tbili  Leukocytosis  Post-ERCP Pancreatitis (ERCP with biliary sphincterotomy and balloon sweep 11/9)    Plan:  - Signs/symptoms today c/w post-ERCP pancreatitis which clinically seemed mild this AM and she was taken for CCY  - IV LR at 150/hr overnight  - Anti-emetics and pain medications as needed  - Bowel regimen  - CLD if tolerated  - Continue to monitor CBC, CMP     Will continue to follow.  I discussed " plan with patient and her family at bedside this morning.    Dick Patrick MD  11/10/24  12:01 EST

## 2024-11-10 NOTE — ANESTHESIA PROCEDURE NOTES
Airway  Urgency: elective    Date/Time: 11/10/2024 8:08 AM  Airway not difficult    General Information and Staff    Patient location during procedure: OR  CRNA/CAA: Navneet Saucedo CRNA    Indications and Patient Condition  Indications for airway management: airway protection    Preoxygenated: yes  MILS not maintained throughout  Mask difficulty assessment: 1 - vent by mask    Final Airway Details  Final airway type: endotracheal airway      Successful airway: ETT  Cuffed: yes   Successful intubation technique: direct laryngoscopy  Endotracheal tube insertion site: oral  Blade: Boykin  Blade size: 2  ETT size (mm): 7.0  Cormack-Lehane Classification: grade I - full view of glottis  Placement verified by: chest auscultation and capnometry   Cuff volume (mL): 6  Measured from: lips  ETT/EBT  to lips (cm): 20  Number of attempts at approach: 1  Assessment: lips, teeth, and gum same as pre-op and atraumatic intubation    Additional Comments  Negative epigastric sounds, Breath sound equal bilaterally with symmetric chest rise and fall

## 2024-11-10 NOTE — ANESTHESIA PROCEDURE NOTES
Peripheral IV    Patient location during procedure: pre-op  Line placed for Difficult Access.  Performed By   CRNA/CAA: Navneet Saucedo CRNA  Preanesthetic Checklist  Completed: patient identified, IV checked, site marked, risks and benefits discussed, surgical consent, monitors and equipment checked, pre-op evaluation and timeout performed  Peripheral IV Prep   Patient position: supine   Prep: ChloraPrep  Peripheral IV Procedure   Laterality:right  Location:  Hand  Catheter size: 20 G          Post Assessment   Dressing Type: tape and transparent.    IV Dressing/Site: clean, dry and intact

## 2024-11-10 NOTE — OP NOTE
Operative Report    Patient Name:  Eder Wills  YOB: 2000  [id number]  11/10/2024      PREOPERATIVE DIAGNOSIS: Acute cholecystitis, choledocholithiasis, pancreatitis      POSTOPERATIVE DIAGNOSIS: Same        PROCEDURE PERFORMED:     Laparoscopic cholecystectomy        SURGEON: Raheem Raza MD      ASSISTANT: None        SPECIMENS: Gallbladder and contents       ESTIMATED BLOOD LOSS: 20 mL       ANESTHESIA: General.        FINDINGS:     1. Critical view of safety established prior to ligation of cystic structures  2.  There was significant retroperitoneal edema consistent with pancreatitis as well as some small simple ascites fluid throughout the right upper quadrant.       INDICATIONS:      The patient is a 23 y.o. female with a history of abdominal pain and a clinical diagnosis consistent with acute cholecystitis and choledocholithiasis. Pre-operative imaging scan confirmed the diagnosis.  She underwent ERCP on November 9 with clearance of her bile duct.  She was noted to have some mild pancreatitis but her exam was unchanged and she was recommended to proceed forward with cholecystectomy.  The risks, benefits and alternatives of laparoscopic cholecystectomy were discussed with the patient and their family preoperatively and they agreed to proceed.        DESCRIPTION OF PROCEDURE:     The patient was taken to the operating room and positioned supine on the operating room table. General anesthesia was initiated. The patient was prepped and draped in the usual sterile fashion. Antibiotics were given preoperatively. SCDs were properly placed on the patient and turned on. An orogastric tube was placed by the anesthesiologist with return of gastric content prior to start of the case.     Local anesthetic was injected prior to all skin incisions. A periumbilical skin incision was then created inferior to the umbilicus utilizing an 11 blade scalpel. Blunt dissection was carried down to the level  of the anterior abdominal wall fascia and the base of the umbilicus. The base of the umbilicus was then grasped and elevated with a Kocher clamp. A vertical incision was then made in the anterior abdominal wall fascia under direct visualization sharply. Following this, the peritoneal cavity was then entered under direct visualization. Stay sutures of 0 Vicryl were placed around the defect, and a 12 mm Tej trocar was then advanced without difficulty into the abdominal cavity. Pneumoperitoneum was established at 15 mmHg. The abdomen was then surveyed with a 10mm 30 degree laparoscope, with special attention to the viscera underlying the insertion site which was found to be free of injury.  Next, under direct laparoscopic visualization three additional 5 mm trocars were placed in the right upper quadrant. The patient was then positioned in the head-up position with the table tilted to the left.    Survey of the abdominal cavity demonstrated significant retroperitoneal edema consistent with pancreatitis.  There was some simple ascites fluid throughout the upper abdomen.  There were omental adhesions to the gallbladder which were taken down using combination of blunt dissection as well as Bovie electrocautery.  With this completed the fundus of the gallbladder was retracted cephalad while the infundibulum of the gallbladder was retracted laterally. Using a combination of hook cautery as well as a [place] dissector, the peritoneum surrounding the cystic pedicle was stripped. Cystic structures were dissected. A critical view of safety was established, meanin and only 2 structures were visualized entering the gallbladder, all fibrous and fatty tissue between the cystic artery and cystic duct were cleared, and the posterior one-third of the gallbladder was removed from the cystic plate. Next 2 clips were placed on the distal cystic duct, 1 clip was placed on the proximal cystic duct, and the duct was transected with  laparoscopic scissors. Next 2 clips were placed on the proximal cystic artery, 1 clip was placed on the distal cystic artery and this was transected with laparoscopic scissors.  A single PDS Endoloop was applied to the cystic duct stump below the clips.  Next the gallbladder was removed from the cystic plate using hook electrocautery.      A 5 mm 30 degree laparoscope was then inserted through the subxiphoid trocar site to visualize the placement of the gallbladder within an Endo Catch bag and then extraction of the gallbladder through the periumbilical trocar site utilizing the Endo Catch bag. Instruments were returned to their native positions. Hemostasis of the cystic plate was confirmed using electrocautery.  Having the ascites fluid within the right upper quadrant as well as a significant retroperitoneal edema, I felt that it was in the best interest to leave a drain in place within the right upper quadrant.  A 10 flat BRUNO drain was then brought into the abdomen and exited out of the right most lateral trocar site.  This was left in place within the right upper quadrant adjacent to the cystic plate. The clipped cystic structures were carefully examined and there was no sign of bilious or bloody drainage. The table was then leveled and limited irrigation of the right upper quadrant was performed with normal saline and hemostasis again confirmed. Next, the 5 mm trocars were removed under direct laparoscopic visualization. The 12 mm trocar was then withdrawn and pneumoperitoneum was released.     The fascia of the periumbilical trocar site was then closed in a figure-of-eight fashion with an 0 Vicryl suture. All wound sites were irrigated and hemostasis confirmed. The skin incisions were then closed using a 4-0 Monocryl suture in the subcuticular layer. Mastisol and Steri-Strips were then applied to each incision site with a clean and dry dressing over this.    After the procedure, the patient was awakened,  extubated, and taken to the postoperative anesthesia care unit for recovery. All needle, instrument, and lap counts were correct. I was personally present and performed all portions of the procedure. There were no immediate complications         Raheem Raza MD  11/10/2024  09:10 EST

## 2024-11-10 NOTE — PLAN OF CARE
Goal Outcome Evaluation:  Plan of Care Reviewed With: patient           Outcome Evaluation: On RA. Up ad rowena. NS plus 20Kcl infusing at 75 ml/hr. PRN Zofran required x1 in addition to scheduled Reglan for nausea. No emesis reported as of this time stamp. Multiple PRNs required for pain with minimal to moderate relief reported. NPO since 0000 for planned cholecystectomy at 0800 this AM. Mother at bedside.

## 2024-11-10 NOTE — ANESTHESIA PREPROCEDURE EVALUATION
Anesthesia Evaluation     Patient summary reviewed and Nursing notes reviewed   no history of anesthetic complications:   NPO Solid Status: > 8 hours  NPO Liquid Status: > 2 hours           Airway   Mallampati: II  TM distance: >3 FB  Neck ROM: full  No difficulty expected  Dental - normal exam     Pulmonary - negative pulmonary ROS and normal exam    breath sounds clear to auscultation  Cardiovascular - negative cardio ROS and normal exam    Rhythm: regular  Rate: normal        Neuro/Psych  (+) psychiatric history ADHD  GI/Hepatic/Renal/Endo    (+) obesity    ROS Comment: Acute cholecystitis    Musculoskeletal (-) negative ROS    Abdominal    Substance History      OB/GYN          Other - negative ROS                     Anesthesia Plan    ASA 2     general     intravenous induction     Anesthetic plan, risks, benefits, and alternatives have been provided, discussed and informed consent has been obtained with: patient.    Plan discussed with CRNA.    CODE STATUS:    Level Of Support Discussed With: Patient  Code Status (Patient has no pulse and is not breathing): CPR (Attempt to Resuscitate)  Medical Interventions (Patient has pulse or is breathing): Full Support

## 2024-11-10 NOTE — PLAN OF CARE
Goal Outcome Evaluation:  Plan of Care Reviewed With: patient, family        Progress: no change  Outcome Evaluation: Day of surgery - lap janell. BRUNO drain site leaking; dressing reinforced. Copious amount of drainage; per Dr. Raza this is expected in pt's case. Pancreatitis complicates care. Now strict NPO. Holding po meds; Flagyl and Protonix changed to IV. Q2h Dilaudid for pain. Pt has ambulated to the bathroom several times; urine pedro. HR elevated.    Total shift BRUNO drain output 535ml.             Problem: Adult Inpatient Plan of Care  Goal: Absence of Hospital-Acquired Illness or Injury  Outcome: Not Progressing  Intervention: Identify and Manage Fall Risk  Recent Flowsheet Documentation  Taken 11/10/2024 1400 by Beth Medrano RN  Safety Promotion/Fall Prevention:   safety round/check completed   nonskid shoes/slippers when out of bed   assistive device/personal items within reach   activity supervised  Taken 11/10/2024 1330 by Beth Medrano RN  Safety Promotion/Fall Prevention:   safety round/check completed   nonskid shoes/slippers when out of bed   activity supervised   assistive device/personal items within reach  Taken 11/10/2024 1039 by Beth Medrano RN  Safety Promotion/Fall Prevention:   safety round/check completed   nonskid shoes/slippers when out of bed   assistive device/personal items within reach   activity supervised  Intervention: Prevent and Manage VTE (Venous Thromboembolism) Risk  Recent Flowsheet Documentation  Taken 11/10/2024 1400 by Beth Medrano RN  VTE Prevention/Management:   bilateral   SCDs (sequential compression devices) on  Taken 11/10/2024 1039 by Beth Medrano RN  VTE Prevention/Management:   bilateral   SCDs (sequential compression devices) on     Problem: Adult Inpatient Plan of Care  Goal: Absence of Hospital-Acquired Illness or Injury  Intervention: Prevent and Manage VTE (Venous Thromboembolism) Risk  Recent Flowsheet Documentation  Taken 11/10/2024 1400 by Beth Medrano  RN  VTE Prevention/Management:   bilateral   SCDs (sequential compression devices) on  Taken 11/10/2024 1039 by Beth Medrano RN  VTE Prevention/Management:   bilateral   SCDs (sequential compression devices) on     Problem: Pain Acute  Goal: Optimal Pain Control and Function  Outcome: Not Progressing

## 2024-11-10 NOTE — PROGRESS NOTES
"Patient Name:  Eder Wills  YOB: 2000  [id number]    Surgery Progress Note    Date of visit: 11/10/2024      Subjective: No significant changes.  Still with some pain across the upper and lower part of the abdomen.  Some nausea but no vomiting.  Symptoms not significantly changed since before the ERCP.  Denies fevers or chills          Objective:     /88 (BP Location: Left arm, Patient Position: Lying)   Pulse 90   Temp 97.5 °F (36.4 °C) (Temporal)   Resp 16   Ht 157.5 cm (62\")   Wt 84.4 kg (186 lb)   LMP 10/29/2024 (Approximate)   SpO2 98%   BMI 34.02 kg/m²     Intake/Output Summary (Last 24 hours) at 11/10/2024 0730  Last data filed at 11/10/2024 0345  Gross per 24 hour   Intake 500 ml   Output 400 ml   Net 100 ml       GEN:   Awake, alert, in no acute distress, resting comfortably in bed   CV:   Regular rate and rhythm  L:  Symmetric expansion, not labored on room air  Abd:  Soft, not distended, some mild tenderness to deep palpation throughout the abdomen  Ext:  No cyanosis, clubbing, or edema    Recent labs that are back at this time have been reviewed.           Assessment/ Plan:    Mrs. Wills is a 23-year-old lady without significant past medical history with choledocholithiasis      # Choledocholithiasis  -Underwent ERCP yesterday  -New leukocytosis to 18,000.  Liver function tests are unchanged since yesterday.  Lipase is 2800.  May have some mild post ERCP pancreatitis, however she reports that her symptoms are largely unchanged since before her procedure and she remains soft to palpation throughout.  I had a long discussion with the patient as well as the family, and they wish to proceed forward with cholecystectomy    I had a long discussion with the patient and their family regarding the risks, benefits, and alternatives to the procedure including specifically discussing the risks of bleeding, infection, damage to adjacent structures, possible need for further " operations, possible need for conversion to an open operation, risks of common bile duct injury and risk of bile leak after the procedure. They wish to proceed.         Raheem Raza MD  11/10/2024  07:33 EST

## 2024-11-11 ENCOUNTER — APPOINTMENT (OUTPATIENT)
Dept: CT IMAGING | Facility: HOSPITAL | Age: 24
End: 2024-11-11
Payer: COMMERCIAL

## 2024-11-11 PROBLEM — K85.90 POST-ERCP ACUTE PANCREATITIS: Status: ACTIVE | Noted: 2024-11-11

## 2024-11-11 PROBLEM — F39 MOOD DISORDER: Chronic | Status: ACTIVE | Noted: 2024-11-08

## 2024-11-11 PROBLEM — E66.9 OBESITY (BMI 30-39.9): Chronic | Status: ACTIVE | Noted: 2024-11-11

## 2024-11-11 PROBLEM — K91.89 POST-ERCP ACUTE PANCREATITIS: Status: ACTIVE | Noted: 2024-11-11

## 2024-11-11 LAB
ALBUMIN SERPL-MCNC: 2.9 G/DL (ref 3.5–5.2)
ALBUMIN SERPL-MCNC: 3.2 G/DL (ref 3.5–5.2)
ALBUMIN/GLOB SERPL: 1.3 G/DL
ALBUMIN/GLOB SERPL: 1.6 G/DL
ALP SERPL-CCNC: 137 U/L (ref 39–117)
ALP SERPL-CCNC: 183 U/L (ref 39–117)
ALT SERPL W P-5'-P-CCNC: 136 U/L (ref 1–33)
ALT SERPL W P-5'-P-CCNC: 195 U/L (ref 1–33)
ANION GAP SERPL CALCULATED.3IONS-SCNC: 14 MMOL/L (ref 5–15)
ANION GAP SERPL CALCULATED.3IONS-SCNC: 9 MMOL/L (ref 5–15)
AST SERPL-CCNC: 30 U/L (ref 1–32)
AST SERPL-CCNC: 39 U/L (ref 1–32)
BASOPHILS # BLD AUTO: 0.08 10*3/MM3 (ref 0–0.2)
BASOPHILS # BLD AUTO: 0.08 10*3/MM3 (ref 0–0.2)
BASOPHILS NFR BLD AUTO: 0.2 % (ref 0–1.5)
BASOPHILS NFR BLD AUTO: 0.3 % (ref 0–1.5)
BILIRUB SERPL-MCNC: 1.5 MG/DL (ref 0–1.2)
BILIRUB SERPL-MCNC: 1.7 MG/DL (ref 0–1.2)
BUN SERPL-MCNC: 11 MG/DL (ref 6–20)
BUN SERPL-MCNC: 11 MG/DL (ref 6–20)
BUN/CREAT SERPL: 15.7 (ref 7–25)
BUN/CREAT SERPL: 17.5 (ref 7–25)
CA-I SERPL ISE-MCNC: 1.01 MMOL/L (ref 1.15–1.3)
CALCIUM SPEC-SCNC: 7.7 MG/DL (ref 8.6–10.5)
CALCIUM SPEC-SCNC: 7.9 MG/DL (ref 8.6–10.5)
CHLORIDE SERPL-SCNC: 100 MMOL/L (ref 98–107)
CHLORIDE SERPL-SCNC: 103 MMOL/L (ref 98–107)
CO2 SERPL-SCNC: 18 MMOL/L (ref 22–29)
CO2 SERPL-SCNC: 23 MMOL/L (ref 22–29)
CREAT SERPL-MCNC: 0.63 MG/DL (ref 0.57–1)
CREAT SERPL-MCNC: 0.7 MG/DL (ref 0.57–1)
CRP SERPL-MCNC: 13.18 MG/DL (ref 0–0.5)
D-LACTATE SERPL-SCNC: 1.9 MMOL/L (ref 0.5–2)
DEPRECATED RDW RBC AUTO: 46.5 FL (ref 37–54)
DEPRECATED RDW RBC AUTO: 47.3 FL (ref 37–54)
EGFRCR SERPLBLD CKD-EPI 2021: 126.4 ML/MIN/1.73
EGFRCR SERPLBLD CKD-EPI 2021: 129.6 ML/MIN/1.73
EOSINOPHIL # BLD AUTO: 0 10*3/MM3 (ref 0–0.4)
EOSINOPHIL # BLD AUTO: 0 10*3/MM3 (ref 0–0.4)
EOSINOPHIL NFR BLD AUTO: 0 % (ref 0.3–6.2)
EOSINOPHIL NFR BLD AUTO: 0 % (ref 0.3–6.2)
ERYTHROCYTE [DISTWIDTH] IN BLOOD BY AUTOMATED COUNT: 13.6 % (ref 12.3–15.4)
ERYTHROCYTE [DISTWIDTH] IN BLOOD BY AUTOMATED COUNT: 13.8 % (ref 12.3–15.4)
GLOBULIN UR ELPH-MCNC: 1.8 GM/DL
GLOBULIN UR ELPH-MCNC: 2.4 GM/DL
GLUCOSE BLDC GLUCOMTR-MCNC: 140 MG/DL (ref 70–130)
GLUCOSE SERPL-MCNC: 151 MG/DL (ref 65–99)
GLUCOSE SERPL-MCNC: 155 MG/DL (ref 65–99)
HCT VFR BLD AUTO: 48.9 % (ref 34–46.6)
HCT VFR BLD AUTO: 49.3 % (ref 34–46.6)
HGB BLD-MCNC: 15.8 G/DL (ref 12–15.9)
HGB BLD-MCNC: 16.3 G/DL (ref 12–15.9)
IMM GRANULOCYTES # BLD AUTO: 0.18 10*3/MM3 (ref 0–0.05)
IMM GRANULOCYTES # BLD AUTO: 0.3 10*3/MM3 (ref 0–0.05)
IMM GRANULOCYTES NFR BLD AUTO: 0.6 % (ref 0–0.5)
IMM GRANULOCYTES NFR BLD AUTO: 0.9 % (ref 0–0.5)
LDH SERPL-CCNC: 366 U/L (ref 135–214)
LIPASE SERPL-CCNC: 1257 U/L (ref 13–60)
LYMPHOCYTES # BLD AUTO: 1.22 10*3/MM3 (ref 0.7–3.1)
LYMPHOCYTES # BLD AUTO: 1.22 10*3/MM3 (ref 0.7–3.1)
LYMPHOCYTES NFR BLD AUTO: 3.6 % (ref 19.6–45.3)
LYMPHOCYTES NFR BLD AUTO: 3.9 % (ref 19.6–45.3)
MAGNESIUM SERPL-MCNC: 1.7 MG/DL (ref 1.6–2.6)
MCH RBC QN AUTO: 30.4 PG (ref 26.6–33)
MCH RBC QN AUTO: 30.4 PG (ref 26.6–33)
MCHC RBC AUTO-ENTMCNC: 32.3 G/DL (ref 31.5–35.7)
MCHC RBC AUTO-ENTMCNC: 33.1 G/DL (ref 31.5–35.7)
MCV RBC AUTO: 91.8 FL (ref 79–97)
MCV RBC AUTO: 94 FL (ref 79–97)
MONOCYTES # BLD AUTO: 2.21 10*3/MM3 (ref 0.1–0.9)
MONOCYTES # BLD AUTO: 2.44 10*3/MM3 (ref 0.1–0.9)
MONOCYTES NFR BLD AUTO: 6.5 % (ref 5–12)
MONOCYTES NFR BLD AUTO: 7.8 % (ref 5–12)
NEUTROPHILS NFR BLD AUTO: 27.19 10*3/MM3 (ref 1.7–7)
NEUTROPHILS NFR BLD AUTO: 29.97 10*3/MM3 (ref 1.7–7)
NEUTROPHILS NFR BLD AUTO: 87.4 % (ref 42.7–76)
NEUTROPHILS NFR BLD AUTO: 88.8 % (ref 42.7–76)
NRBC BLD AUTO-RTO: 0 /100 WBC (ref 0–0.2)
NRBC BLD AUTO-RTO: 0 /100 WBC (ref 0–0.2)
PLAT MORPH BLD: NORMAL
PLAT MORPH BLD: NORMAL
PLATELET # BLD AUTO: 465 10*3/MM3 (ref 140–450)
PLATELET # BLD AUTO: 483 10*3/MM3 (ref 140–450)
PMV BLD AUTO: 10.7 FL (ref 6–12)
PMV BLD AUTO: 10.7 FL (ref 6–12)
POTASSIUM SERPL-SCNC: 4.1 MMOL/L (ref 3.5–5.2)
POTASSIUM SERPL-SCNC: 4.3 MMOL/L (ref 3.5–5.2)
PROT SERPL-MCNC: 4.7 G/DL (ref 6–8.5)
PROT SERPL-MCNC: 5.6 G/DL (ref 6–8.5)
RBC # BLD AUTO: 5.2 10*6/MM3 (ref 3.77–5.28)
RBC # BLD AUTO: 5.37 10*6/MM3 (ref 3.77–5.28)
RBC MORPH BLD: NORMAL
RBC MORPH BLD: NORMAL
SODIUM SERPL-SCNC: 132 MMOL/L (ref 136–145)
SODIUM SERPL-SCNC: 135 MMOL/L (ref 136–145)
WBC MORPH BLD: NORMAL
WBC MORPH BLD: NORMAL
WBC NRBC COR # BLD AUTO: 31.11 10*3/MM3 (ref 3.4–10.8)
WBC NRBC COR # BLD AUTO: 33.78 10*3/MM3 (ref 3.4–10.8)

## 2024-11-11 PROCEDURE — 82330 ASSAY OF CALCIUM: CPT | Performed by: NURSE PRACTITIONER

## 2024-11-11 PROCEDURE — 85025 COMPLETE CBC W/AUTO DIFF WBC: CPT

## 2024-11-11 PROCEDURE — 83690 ASSAY OF LIPASE: CPT | Performed by: SURGERY

## 2024-11-11 PROCEDURE — 25010000002 PIPERACILLIN SOD-TAZOBACTAM PER 1 G: Performed by: NURSE PRACTITIONER

## 2024-11-11 PROCEDURE — 85007 BL SMEAR W/DIFF WBC COUNT: CPT | Performed by: NURSE PRACTITIONER

## 2024-11-11 PROCEDURE — 25510000001 IOPAMIDOL PER 1 ML: Performed by: HOSPITALIST

## 2024-11-11 PROCEDURE — 85025 COMPLETE CBC W/AUTO DIFF WBC: CPT | Performed by: NURSE PRACTITIONER

## 2024-11-11 PROCEDURE — 25810000003 SODIUM CHLORIDE 0.9 % SOLUTION

## 2024-11-11 PROCEDURE — 83735 ASSAY OF MAGNESIUM: CPT

## 2024-11-11 PROCEDURE — 93010 ELECTROCARDIOGRAM REPORT: CPT | Performed by: INTERNAL MEDICINE

## 2024-11-11 PROCEDURE — 80053 COMPREHEN METABOLIC PANEL: CPT

## 2024-11-11 PROCEDURE — P9041 ALBUMIN (HUMAN),5%, 50ML: HCPCS | Performed by: NURSE PRACTITIONER

## 2024-11-11 PROCEDURE — 87040 BLOOD CULTURE FOR BACTERIA: CPT | Performed by: NURSE PRACTITIONER

## 2024-11-11 PROCEDURE — 83605 ASSAY OF LACTIC ACID: CPT | Performed by: NURSE PRACTITIONER

## 2024-11-11 PROCEDURE — 99232 SBSQ HOSP IP/OBS MODERATE 35: CPT | Performed by: INTERNAL MEDICINE

## 2024-11-11 PROCEDURE — 74178 CT ABD&PLV WO CNTR FLWD CNTR: CPT

## 2024-11-11 PROCEDURE — 85007 BL SMEAR W/DIFF WBC COUNT: CPT

## 2024-11-11 PROCEDURE — 25010000002 ALBUMIN HUMAN 5% PER 50 ML: Performed by: NURSE PRACTITIONER

## 2024-11-11 PROCEDURE — 25010000002 METRONIDAZOLE 500 MG/100ML SOLUTION: Performed by: NURSE PRACTITIONER

## 2024-11-11 PROCEDURE — 83615 LACTATE (LD) (LDH) ENZYME: CPT | Performed by: NURSE PRACTITIONER

## 2024-11-11 PROCEDURE — 80053 COMPREHEN METABOLIC PANEL: CPT | Performed by: NURSE PRACTITIONER

## 2024-11-11 PROCEDURE — 25010000002 HEPARIN (PORCINE) PER 1000 UNITS: Performed by: SURGERY

## 2024-11-11 PROCEDURE — 25810000003 LACTATED RINGERS PER 1000 ML: Performed by: SURGERY

## 2024-11-11 PROCEDURE — 82948 REAGENT STRIP/BLOOD GLUCOSE: CPT

## 2024-11-11 PROCEDURE — 25010000002 METOCLOPRAMIDE PER 10 MG: Performed by: SURGERY

## 2024-11-11 PROCEDURE — 86140 C-REACTIVE PROTEIN: CPT | Performed by: NURSE PRACTITIONER

## 2024-11-11 PROCEDURE — 25010000002 PIPERACILLIN SOD-TAZOBACTAM PER 1 G: Performed by: INTERNAL MEDICINE

## 2024-11-11 PROCEDURE — 25010000002 HYDROMORPHONE PER 4 MG: Performed by: SURGERY

## 2024-11-11 PROCEDURE — 93005 ELECTROCARDIOGRAM TRACING: CPT | Performed by: HOSPITALIST

## 2024-11-11 PROCEDURE — 99232 SBSQ HOSP IP/OBS MODERATE 35: CPT | Performed by: NURSE PRACTITIONER

## 2024-11-11 PROCEDURE — 25810000003 LACTATED RINGERS PER 1000 ML: Performed by: INTERNAL MEDICINE

## 2024-11-11 PROCEDURE — 93005 ELECTROCARDIOGRAM TRACING: CPT

## 2024-11-11 PROCEDURE — 71275 CT ANGIOGRAPHY CHEST: CPT

## 2024-11-11 RX ORDER — ALBUMIN, HUMAN INJ 5% 5 %
500 SOLUTION INTRAVENOUS ONCE
Status: COMPLETED | OUTPATIENT
Start: 2024-11-11 | End: 2024-11-11

## 2024-11-11 RX ORDER — SODIUM CHLORIDE, SODIUM LACTATE, POTASSIUM CHLORIDE, CALCIUM CHLORIDE 600; 310; 30; 20 MG/100ML; MG/100ML; MG/100ML; MG/100ML
1000 INJECTION, SOLUTION INTRAVENOUS CONTINUOUS
Status: ACTIVE | OUTPATIENT
Start: 2024-11-11 | End: 2024-11-11

## 2024-11-11 RX ORDER — IOPAMIDOL 755 MG/ML
100 INJECTION, SOLUTION INTRAVASCULAR
Status: COMPLETED | OUTPATIENT
Start: 2024-11-11 | End: 2024-11-11

## 2024-11-11 RX ADMIN — ALBUMIN (HUMAN) 500 ML: 12.5 INJECTION, SOLUTION INTRAVENOUS at 14:57

## 2024-11-11 RX ADMIN — PANTOPRAZOLE SODIUM 40 MG: 40 INJECTION, POWDER, FOR SOLUTION INTRAVENOUS at 05:30

## 2024-11-11 RX ADMIN — HYDROMORPHONE HYDROCHLORIDE 0.5 MG: 1 INJECTION, SOLUTION INTRAMUSCULAR; INTRAVENOUS; SUBCUTANEOUS at 19:43

## 2024-11-11 RX ADMIN — SODIUM CHLORIDE 500 ML: 9 INJECTION, SOLUTION INTRAVENOUS at 02:28

## 2024-11-11 RX ADMIN — HYDROMORPHONE HYDROCHLORIDE 0.5 MG: 1 INJECTION, SOLUTION INTRAMUSCULAR; INTRAVENOUS; SUBCUTANEOUS at 04:30

## 2024-11-11 RX ADMIN — PIPERACILLIN AND TAZOBACTAM 3.38 G: 3; .375 INJECTION, POWDER, LYOPHILIZED, FOR SOLUTION INTRAVENOUS at 16:41

## 2024-11-11 RX ADMIN — METOCLOPRAMIDE 10 MG: 5 INJECTION, SOLUTION INTRAMUSCULAR; INTRAVENOUS at 08:21

## 2024-11-11 RX ADMIN — SIMETHICONE 80 MG: 80 TABLET, CHEWABLE ORAL at 16:50

## 2024-11-11 RX ADMIN — SODIUM CHLORIDE, POTASSIUM CHLORIDE, SODIUM LACTATE AND CALCIUM CHLORIDE 1000 ML/HR: 600; 310; 30; 20 INJECTION, SOLUTION INTRAVENOUS at 07:49

## 2024-11-11 RX ADMIN — ALBUMIN (HUMAN) 500 ML: 12.5 INJECTION, SOLUTION INTRAVENOUS at 20:20

## 2024-11-11 RX ADMIN — DOCUSATE SODIUM 100 MG: 100 CAPSULE, LIQUID FILLED ORAL at 08:21

## 2024-11-11 RX ADMIN — SODIUM CHLORIDE, SODIUM LACTATE, POTASSIUM CHLORIDE, CALCIUM CHLORIDE 150 ML/HR: 20; 30; 600; 310 INJECTION, SOLUTION INTRAVENOUS at 11:18

## 2024-11-11 RX ADMIN — HEPARIN SODIUM 5000 UNITS: 5000 INJECTION INTRAVENOUS; SUBCUTANEOUS at 05:30

## 2024-11-11 RX ADMIN — PIPERACILLIN AND TAZOBACTAM 3.38 G: 3; .375 INJECTION, POWDER, LYOPHILIZED, FOR SOLUTION INTRAVENOUS at 23:54

## 2024-11-11 RX ADMIN — SODIUM CHLORIDE, SODIUM LACTATE, POTASSIUM CHLORIDE, CALCIUM CHLORIDE 150 ML/HR: 20; 30; 600; 310 INJECTION, SOLUTION INTRAVENOUS at 02:29

## 2024-11-11 RX ADMIN — HEPARIN SODIUM 5000 UNITS: 5000 INJECTION INTRAVENOUS; SUBCUTANEOUS at 22:08

## 2024-11-11 RX ADMIN — FLUOXETINE HYDROCHLORIDE 40 MG: 20 CAPSULE ORAL at 08:21

## 2024-11-11 RX ADMIN — IOPAMIDOL 85 ML: 755 INJECTION, SOLUTION INTRAVENOUS at 13:09

## 2024-11-11 RX ADMIN — HYDROCODONE BITARTRATE AND ACETAMINOPHEN 1 TABLET: 5; 325 TABLET ORAL at 16:50

## 2024-11-11 RX ADMIN — SODIUM CHLORIDE, SODIUM LACTATE, POTASSIUM CHLORIDE, CALCIUM CHLORIDE 175 ML/HR: 20; 30; 600; 310 INJECTION, SOLUTION INTRAVENOUS at 16:42

## 2024-11-11 RX ADMIN — METOCLOPRAMIDE 10 MG: 5 INJECTION, SOLUTION INTRAMUSCULAR; INTRAVENOUS at 03:05

## 2024-11-11 RX ADMIN — HYDROMORPHONE HYDROCHLORIDE 0.5 MG: 1 INJECTION, SOLUTION INTRAMUSCULAR; INTRAVENOUS; SUBCUTANEOUS at 00:32

## 2024-11-11 RX ADMIN — Medication 500 MG: at 05:31

## 2024-11-11 RX ADMIN — BISACODYL 10 MG: 5 TABLET, COATED ORAL at 08:21

## 2024-11-11 RX ADMIN — HEPARIN SODIUM 5000 UNITS: 5000 INJECTION INTRAVENOUS; SUBCUTANEOUS at 14:56

## 2024-11-11 RX ADMIN — HYDROCODONE BITARTRATE AND ACETAMINOPHEN 1 TABLET: 5; 325 TABLET ORAL at 08:32

## 2024-11-11 RX ADMIN — PIPERACILLIN AND TAZOBACTAM 3.38 G: 3; .375 INJECTION, POWDER, LYOPHILIZED, FOR SOLUTION INTRAVENOUS at 08:22

## 2024-11-11 RX ADMIN — METRONIDAZOLE 500 MG: 500 INJECTION, SOLUTION INTRAVENOUS at 05:30

## 2024-11-11 RX ADMIN — METOCLOPRAMIDE 10 MG: 5 INJECTION, SOLUTION INTRAMUSCULAR; INTRAVENOUS at 20:23

## 2024-11-11 RX ADMIN — METOCLOPRAMIDE 10 MG: 5 INJECTION, SOLUTION INTRAMUSCULAR; INTRAVENOUS at 14:56

## 2024-11-11 RX ADMIN — DOCUSATE SODIUM 100 MG: 100 CAPSULE, LIQUID FILLED ORAL at 20:23

## 2024-11-11 RX ADMIN — SODIUM CHLORIDE, SODIUM LACTATE, POTASSIUM CHLORIDE, CALCIUM CHLORIDE 175 ML/HR: 20; 30; 600; 310 INJECTION, SOLUTION INTRAVENOUS at 22:32

## 2024-11-11 NOTE — PROGRESS NOTES
[facility] Medicine Services  PROGRESS NOTE    Patient Name: Eder Wills  : 2000  MRN: [id number]    Date of Admission: 2024  Primary Care Physician: Eva Tovar MD    Subjective   Subjective     CC:  F/u abdominal pain, vomiting    HPI:  Labs noted from overnight, continue   Afebrile, +sweats    Re-eval 1230 after rapid called, pt awake alert, color improved.  No changes with pain. Reports pleuritic chest pain.  Remains tachycardic. Pt more concerned with what is going on with her mom at bedside who had a syncopal episode. Will move to tele after imaging    Objective   Objective     Vital Signs:   Temp:  [97.2 °F (36.2 °C)-98.7 °F (37.1 °C)] 98 °F (36.7 °C)  Heart Rate:  [] 139  Resp:  [17-43] 43  BP: (119-129)/(71-95) 129/88     Physical Exam:  Constitutional: No acute distress, pale, lying in bed, appears uncomfortable but states no changes with amount of pain  HENT: NCAT, mucous membranes moist  Respiratory: Clear to auscultation bilaterally, respiratory effort normal   Cardiovascular: tachycardic, no m/r/g  Gastrointestinal: Positive bowel sounds, soft, nontender, nondistended  Musculoskeletal: No bilateral ankle edema  Psychiatric: Flat affect, cooperative  Neurologic: Oriented x 3, THOMPSON, speech clear  Skin: No rashes        Results Reviewed:  LAB RESULTS:      Lab 24  0132 11/10/24  0522 24  0527 24  2240   WBC 31.11* 18.12* 8.62 9.78   HEMOGLOBIN 16.3* 15.8 12.3 12.4   HEMATOCRIT 49.3* 47.6* 37.6 38.0   PLATELETS 483* 450 315 337   NEUTROS ABS 27.19* 15.84* 5.43 7.46*   IMMATURE GRANS (ABS) 0.18* 0.10* 0.02 0.03   LYMPHS ABS 1.22 0.76 2.20 1.46   MONOS ABS 2.44* 1.39* 0.77 0.65   EOS ABS 0.00 0.00 0.16 0.14   MCV 91.8 91.5 92.4 92.5   CRP 13.18* 1.82*  --   --    PROCALCITONIN  --  0.04  --  0.12   LACTATE  --   --   --  1.1   PROTIME  --  13.5  --   --          Lab 24  0132 11/10/24  0522 24  0951 24  0527  11/07/24  2240   SODIUM 135* 136 139 147* 143   POTASSIUM 4.3 4.7 4.1 4.4 4.1   CHLORIDE 103 97* 106 112* 108*   CO2 23.0 23.0 21.0* 21.0* 21.0*   ANION GAP 9.0 16.0* 12.0 14.0 14.0   BUN 11 10 10 9 9   CREATININE 0.63 0.71 0.68 0.77 0.83   EGFR 129.6 124.2 127.3 112.7 103.0   GLUCOSE 155* 166* 102* 91 117*   CALCIUM 7.9* 9.4 9.1 9.1 9.1   MAGNESIUM 1.7  --   --   --   --          Lab 11/11/24  0132 11/10/24  0522 11/09/24  0951 11/08/24  0527 11/07/24  2240   TOTAL PROTEIN 5.6* 6.4 6.4 6.8 7.0   ALBUMIN 3.2* 4.2 4.0 3.9 4.2   GLOBULIN 2.4 2.2 2.4 2.9 2.8   ALT (SGPT) 195* 351* 432* 467* 519*   AST (SGOT) 39* 86* 155* 142* 177*   BILIRUBIN 1.7* 2.4* 2.4* 2.9* 3.2*   ALK PHOS 183* 294* 258* 312* 325*   LIPASE 1,257* 2,820*  --   --  60         Lab 11/10/24  0522   PROTIME 13.5   INR 1.02                 Brief Urine Lab Results  (Last result in the past 365 days)        Color   Clarity   Blood   Leuk Est   Nitrite   Protein   CREAT   Urine HCG        11/08/24 0113 Yellow   Clear   Negative   Negative   Negative   Negative                   Microbiology Results Abnormal       None            No radiology results from the last 24 hrs        Current medications:  Scheduled Meds:bisacodyl, 10 mg, Oral, Daily  docusate sodium, 100 mg, Oral, BID  FLUoxetine, 40 mg, Oral, Daily  heparin (porcine), 5,000 Units, Subcutaneous, Q8H  metoclopramide, 10 mg, Intravenous, Q6H  pantoprazole, 40 mg, Intravenous, Q AM  Pharmacy Consult for Metronidazole IV, 500 mg, Does not apply, Q8H  piperacillin-tazobactam, 3.375 g, Intravenous, Q8H      Continuous Infusions:lactated ringers, 150 mL/hr, Last Rate: 150 mL/hr (11/11/24 0426)      PRN Meds:.  acetaminophen    HYDROcodone-acetaminophen    HYDROmorphone **AND** naloxone    Morphine    ondansetron ODT **OR** ondansetron    oxyCODONE-acetaminophen    promethazine    simethicone    [COMPLETED] Insert Peripheral IV **AND** sodium chloride    Assessment & Plan   Assessment & Plan     Active  Hospital Problems    Diagnosis  POA    **Biliary obstruction [K83.1]  Yes    Obesity (BMI 30-39.9) [E66.9]  Yes    Post-ERCP acute pancreatitis [K91.89, K85.90]  No    Calculus of gallbladder and bile duct with obstruction [K80.71]  Yes    Mood disorder [F39]  Yes      Resolved Hospital Problems   No resolved problems to display.        Brief Hospital Course to date:  Eder Wills is a 23 y.o. female with no PMH, who was recently diagnosed with cholelithiasis after 6 weeks of abdominal/ right sided back pain and was scheduled for surgical evaluation. However, patient presented to [facility] ED 11/7 for worsening abdominal pain associated with N/V. MRCP notes choledocholithiasis. GI and Gen surgery consulted.     This patient's problems and plans were partially entered by my partner and updated as appropriate by me 11/11/24.     Choledocholithiasis  Cholecystitis  -- LFTs, T Bili trending down  -- ERCP 11/9 with Dr. Patrick revealed dilated main bile duct, no stones or filling defects  -- lap CCY 11/10 with Dr. Raza  -- pain and nausea control. Rates generalized abdominal pain 5/10  -- Rocephin/Flagyl changed to zosyn  -- continue bowel regimen  -- CT abd/pelvis pending    Post ERCP Pancreatitis  -- elevated lipase 2,820, s/p ERCP 11/9. Down to 1,257 11/11  -- WBCs jumped to 31K this am, IVF added overnight.  Repeat pending  -- CRP up to 13.18  -- continue zosyn  -- IV LR @ 150 ml/hr, continue  -- tolerating clear liquids    Pleuritic chest pain  -- sinus tach EKG  -- CT angio pending    Expected Discharge Location and Transportation: Home  Expected Discharge   Expected Discharge Date: 11/12/2024; Expected Discharge Time:      VTE Prophylaxis:  Pharmacologic & mechanical VTE prophylaxis orders are present.         AM-PAC 6 Clicks Score (PT): 21 (11/11/24 0815)    CODE STATUS:   Code Status and Medical Interventions: CPR (Attempt to Resuscitate); Full Support   Ordered at: 11/08/24 0236     Level Of Support Discussed  With:    Patient     Code Status (Patient has no pulse and is not breathing):    CPR (Attempt to Resuscitate)     Medical Interventions (Patient has pulse or is breathing):    Full Support       Hemalatha Hobbs, MARITZA  11/11/24

## 2024-11-11 NOTE — PLAN OF CARE
Admitted to ICU following rapid response on floor, pt tachycardic, tachypnic, and hypertensive on arrival, IVF and albumin given with good response in HR, BP, and RR, copious serous leakage from BRUNO site, dressing of split gauze, 4x4s, and tape changed as needed, 150ml from BRUNO since arrival, up to BSC with stand-by assist, a-febrile, c/o incisional pain, PRNs utilized, family at bedside

## 2024-11-11 NOTE — PAYOR COMM NOTE
"Eder Alves (23 y.o. Female)     [id number]     Charlette Decker, RN  Utilization Review  [phone]  [phone]      Reconsideration (pt now in ICU)          Date of Birth   2000    Social Security Number       Address   [address] [place] [address]    Home Phone   [phone]    MRN   [id number]       Zoroastrian   Confucianism    Marital Status   Single                            Admission Date   11/7/24    Admission Type   Emergency    Admitting Provider   Rafael Castellon MD    Attending Provider   Rafael Castellon MD    Department, Room/Bed   [facility] 2A ICU, N221/1       Discharge Date       Discharge Disposition       Discharge Destination                                 Attending Provider: Rafael Castellon MD    Allergies: No Known Allergies    Isolation: None   Infection: None   Code Status: CPR    Ht: 157.5 cm (62\")   Wt: 84.4 kg (186 lb)    Admission Cmt: None   Principal Problem: Biliary obstruction [K83.1]                   Active Insurance as of 11/7/2024       Primary Coverage       Payor Plan Insurance Group Employer/Plan Group    [employer] [facility] [employer] EM[employer] [id number]       Payor Plan Address Payor Plan Phone Number Payor Plan Fax Number Effective Dates    [address] [phone]  1/1/2015 - None Entered    [place] [address]         Subscriber Name Subscriber Birth Date Member ID       ANDRADE ALVES 7/13/1973 [id number]                     Emergency Contacts        (Rel.) Home Phone Work Phone Mobile Phone    Andrade Scruggs (Mother) [phone] -- [phone]              Vital Signs (last 2 days)       Date/Time Temp Temp src Pulse Resp BP Patient Position SpO2    11/11/24 1210 98 (36.7) Oral 139 43 129/88 Lying 94    11/11/24 1206 -- -- -- 39 -- -- --    11/11/24 1145 98.7 (37.1) Oral 138 26 120/92 Lying 96    11/11/24 1130 97.7 (36.5) Oral 137  -- -- -- --    Pulse: nurse notified at 11/11/24 1130    11/11/24 " 1124 97.8 (36.6) Temporal 145  21 128/95 Lying 95    Pulse: nurse notifed at 11/11/24 1124    11/11/24 0700 98.1 (36.7) Temporal 121  17 129/86 Lying 97    Pulse: nurse notified at 11/11/24 0700    11/11/24 0318 98.7 (37.1) Oral 121 18 126/89 Lying 95    11/11/24 0018 98.5 (36.9) Oral 126  18 124/90 Lying 95    Pulse: RN Aware at 11/11/24 0018    11/10/24 2032 97.2 (36.2) Temporal 120 18 129/92 Lying 96    11/10/24 1700 -- -- 96 -- -- Sitting --    11/10/24 1531 97.2 (36.2) Temporal 112 18 119/71 Lying 94    Comment rows:    OBSERV: just returned from bathroom at 11/10/24 1531    11/10/24 1300 -- -- -- -- -- -- 95    11/10/24 1100 97.4 (36.3) Temporal 98 17 122/90 Lying 98    11/10/24 1018 97.2 (36.2) Temporal 88 17 120/81 Lying 98    11/10/24 1000 98 (36.7) -- 92 16 113/79 -- 99    11/10/24 0955 -- -- 86 -- -- -- 100    11/10/24 0950 -- -- 90 -- -- -- 99    11/10/24 0945 98 (36.7) -- 90 16 108/68 -- 100    11/10/24 0940 -- -- 86 -- -- -- 100    11/10/24 0935 97.7 (36.5) -- 86 18 117/77 -- 100    11/10/24 0930 97.7 (36.5) -- 92 19 108/78 -- 100    11/10/24 0925 97.8 (36.6) -- 90 18 111/76 -- 100    11/10/24 0920 97.8 (36.6) -- 91 18 107/65 -- 100    11/10/24 0919 97.8 (36.6) Oral -- 20 103/67 Lying 100    11/10/24 0735 98.2 (36.8) Oral 101 21 129/87 Lying 96    11/10/24 0715 -- -- -- -- -- -- --    Comment rows:    OBSERV: to preop at 11/10/24 0715    11/10/24 0345 97.5 (36.4) Temporal 90 16 134/88 Lying 98    11/10/24 0000 97.2 (36.2) Temporal 79 16 125/86 Lying 98    11/09/24 2008 97.5 (36.4) Temporal 74 16 123/86 Lying 99    11/09/24 1544 97.6 (36.4) Temporal 80 16 105/65 Lying 99    11/09/24 1205 96.8 (36) Temporal 71 16 112/73 Lying 99    11/09/24 0946 97 (36.1) -- 61 16 94/64 -- 98    11/09/24 0925 97.6 (36.4) -- 70 16 98/66 -- 96    11/09/24 0924 -- -- 75 -- -- -- 99    11/09/24 0923 -- -- 70 -- -- -- 100    11/09/24 0922 -- -- 75 -- -- -- 100    11/09/24 0920 97.6 (36.4) -- 78 17 123/66 -- 100    11/09/24  0919 -- -- 64 -- -- -- 100    11/09/24 0916 -- -- 74 -- -- -- 100    11/09/24 0915 97.7 (36.5) -- 62 20 103/74 -- 100    11/09/24 0910 97.7 (36.5) -- 64 20 93/63 -- 100    11/09/24 0905 97.7 (36.5) -- 66 16 102/59 -- 100    11/09/24 0900 97.7 (36.5) -- -- 16 107/75 -- 100    11/09/24 0857 97.7 (36.5) Oral 88 16 113/78 Lying 100    11/09/24 0748 97 (36.1) Temporal 70 16 98/61 Lying 97    11/09/24 0744 -- -- -- -- -- -- --    Comment rows:    OBSERV: to Endo at 11/09/24 0744    11/09/24 0703 97.4 (36.3) Temporal 59 16 130/98 Lying 98    11/09/24 0359 96.6 (35.9) Temporal 71 16 92/60 Lying 97          Oxygen Therapy (last 2 days)       Date/Time SpO2 Device (Oxygen Therapy) Flow (L/min) (Oxygen Therapy) Oxygen Concentration (%) ETCO2 (mmHg)    11/11/24 1210 94 room air -- -- --    11/11/24 1145 96 room air -- -- --    11/11/24 1124 95 room air -- -- --    11/11/24 0815 -- room air -- -- --    11/11/24 0700 97 room air -- -- --    11/11/24 0318 95 room air -- -- --    11/11/24 0018 95 room air -- -- --    11/10/24 2032 96 room air -- -- --    11/10/24 2000 -- room air -- -- --    11/10/24 1531 94 room air -- -- --    11/10/24 1300 95 room air -- -- --    11/10/24 1100 98 room air -- -- --    11/10/24 1018 98 nasal cannula 2 -- --    11/10/24 1000 99 -- -- -- --    11/10/24 0955 100 -- -- -- --    11/10/24 0950 99 -- -- -- --    11/10/24 0945 100 nasal cannula 2 -- --    11/10/24 0940 100 -- -- -- --    11/10/24 0935 100 -- 2 -- --    11/10/24 0930 100 nasal cannula 2 -- --    11/10/24 0925 100 -- 2 -- --    11/10/24 0920 100 -- 2 -- --    11/10/24 0919 100 nasal cannula 2 -- --    11/10/24 0735 96 -- -- -- --    11/10/24 0622 -- room air -- -- --    11/10/24 0400 -- room air -- -- --    11/10/24 0345 98 room air -- -- --    11/10/24 0219 -- room air -- -- --    11/10/24 0000 98 room air -- -- --    11/09/24 2240 -- room air -- -- --    11/09/24 2045 -- room air -- -- --    11/09/24 2008 99 room air -- -- --    11/09/24  1544 99 -- -- -- --    11/09/24 1205 99 -- -- -- --    11/09/24 0946 98 -- -- -- --    11/09/24 0925 96 room air -- -- --    11/09/24 0924 99 -- -- -- --    11/09/24 0923 100 -- -- -- --    11/09/24 0922 100 -- -- -- --    11/09/24 0920 100 room air -- -- --    11/09/24 0919 100 -- -- -- --    11/09/24 0916 100 -- -- -- --    11/09/24 0915 100 -- -- -- --    11/09/24 0910 100 -- -- -- --    11/09/24 0905 100 -- -- -- --    11/09/24 0900 100 -- -- -- --    11/09/24 0857 100 nasal cannula 3 -- --    11/09/24 0748 97 room air -- -- --    11/09/24 0703 98 -- -- -- --    11/09/24 0359 97 room air -- -- --          Lines, Drains & Airways       Active LDAs       Name Placement date Placement time Site Days    Peripheral IV 11/07/24 2244 Right Antecubital 11/07/24 2244  Antecubital  3    Peripheral IV 11/10/24 0920 Right Hand 11/10/24  0920  created via procedure documentation  Hand  1    Closed/Suction Drain 1 Right;Anterior Abdomen Bulb 10 Fr. 11/10/24  0900  Abdomen  1                  Current Facility-Administered Medications   Medication Dose Route Frequency Provider Last Rate Last Admin    acetaminophen (TYLENOL) tablet 650 mg  650 mg Oral Q6H PRN Raheem Raza MD        albumin human 5 % solution 500 mL  500 mL Intravenous Once Ivis Umaña, DNP, APRN        bisacodyl (DULCOLAX) EC tablet 10 mg  10 mg Oral Daily Raheem Raza MD   10 mg at 11/11/24 0821    docusate sodium (COLACE) capsule 100 mg  100 mg Oral BID Raheem Raza MD   100 mg at 11/11/24 0821    FLUoxetine (PROzac) capsule 40 mg  40 mg Oral Daily Raheem Raza MD   40 mg at 11/11/24 0821    heparin (porcine) 5000 UNIT/ML injection 5,000 Units  5,000 Units Subcutaneous Q8H Raheem Raza MD   5,000 Units at 11/11/24 0530    HYDROcodone-acetaminophen (NORCO) 5-325 MG per tablet 1 tablet  1 tablet Oral Q6H PRN Raheem Raza MD   1 tablet at 11/11/24 0832    HYDROmorphone (DILAUDID) injection 0.5 mg  0.5 mg Intravenous Q2H  PRN Raheem Raza MD   0.5 mg at 11/11/24 0430    And    naloxone (NARCAN) injection 0.4 mg  0.4 mg Intravenous Q5 Min PRN Raheem Raza MD        lactated ringers infusion  150 mL/hr Intravenous Continuous Raheem Raza  mL/hr at 11/11/24 1118 150 mL/hr at 11/11/24 1118    metoclopramide (REGLAN) injection 10 mg  10 mg Intravenous Q6H Raheem Raza MD   10 mg at 11/11/24 0821    morphine injection 2 mg  2 mg Intravenous Q3H PRN Raheem Raza MD   2 mg at 11/10/24 0622    ondansetron ODT (ZOFRAN-ODT) disintegrating tablet 4 mg  4 mg Oral Q6H PRN Raheem Raza MD        Or    ondansetron (ZOFRAN) injection 4 mg  4 mg Intravenous Q6H PRN Raheem Raza MD        oxyCODONE-acetaminophen (PERCOCET) 5-325 MG per tablet 2 tablet  2 tablet Oral Q4H PRN Raheem Raza MD        pantoprazole (PROTONIX) injection 40 mg  40 mg Intravenous Q AM Leslie Nancet E, APRN   40 mg at 11/11/24 0530    piperacillin-tazobactam (ZOSYN) 3.375 g IVPB in 100 mL NS MBP (CD)  3.375 g Intravenous Q8H Latasha Nance E, APRN 25 mL/hr at 11/11/24 0822 3.375 g at 11/11/24 0822    promethazine (PHENERGAN) tablet 12.5 mg  12.5 mg Oral Q6H PRN Raheem Raza MD        simethicone (MYLICON) chewable tablet 80 mg  80 mg Oral 4x Daily PRN Raheem Raza MD   80 mg at 11/09/24 2117    sodium chloride 0.9 % flush 10 mL  10 mL Intravenous PRN Raheem Raza MD         Blood Administration Record (From admission, onward)      None          Lab Results (last 48 hours)       Procedure Component Value Units Date/Time    CBC & Differential [[id number]]  (Abnormal) Collected: 11/11/24 1239    Specimen: Blood Updated: 11/11/24 1410    Narrative:      The following orders were created for panel order CBC & Differential.  Procedure                               Abnormality         Status                     ---------                               -----------         ------                     CBC Auto  Differential[[id number]]        Abnormal            Final result               Scan Slide[[id number]]                   Normal              Final result                 Please view results for these tests on the individual orders.    CBC Auto Differential [[id number]]  (Abnormal) Collected: 11/11/24 1239    Specimen: Blood Updated: 11/11/24 1410     WBC 33.78 10*3/mm3      RBC 5.20 10*6/mm3      Hemoglobin 15.8 g/dL      Hematocrit 48.9 %      MCV 94.0 fL      MCH 30.4 pg      MCHC 32.3 g/dL      RDW 13.8 %      RDW-SD 47.3 fl      MPV 10.7 fL      Platelets 465 10*3/mm3      Neutrophil % 88.8 %      Lymphocyte % 3.6 %      Monocyte % 6.5 %      Eosinophil % 0.0 %      Basophil % 0.2 %      Immature Grans % 0.9 %      Neutrophils, Absolute 29.97 10*3/mm3      Lymphocytes, Absolute 1.22 10*3/mm3      Monocytes, Absolute 2.21 10*3/mm3      Eosinophils, Absolute 0.00 10*3/mm3      Basophils, Absolute 0.08 10*3/mm3      Immature Grans, Absolute 0.30 10*3/mm3      nRBC 0.0 /100 WBC     Narrative:      Appended report. These results have been appended to a previously verified report.    Scan Slide [[id number]]  (Normal) Collected: 11/11/24 1239    Specimen: Blood Updated: 11/11/24 1410     RBC Morphology Normal     WBC Morphology Normal     Platelet Morphology Normal    Comprehensive Metabolic Panel [[id number]]  (Abnormal) Collected: 11/11/24 1239    Specimen: Blood Updated: 11/11/24 1337     Glucose 151 mg/dL      BUN 11 mg/dL      Creatinine 0.70 mg/dL      Sodium 132 mmol/L      Potassium 4.1 mmol/L      Comment: Slight hemolysis detected by analyzer. Result may be falsely elevated.        Chloride 100 mmol/L      CO2 18.0 mmol/L      Calcium 7.7 mg/dL      Total Protein 4.7 g/dL      Albumin 2.9 g/dL      ALT (SGPT) 136 U/L      AST (SGOT) 30 U/L      Alkaline Phosphatase 137 U/L      Total Bilirubin 1.5 mg/dL      Globulin 1.8 gm/dL      Comment: Calculated Result        A/G Ratio 1.6 g/dL      BUN/Creatinine Ratio 15.7      Anion Gap 14.0 mmol/L      eGFR 126.4 mL/min/1.73     Narrative:      GFR Normal >60  Chronic Kidney Disease <60  Kidney Failure <15      Blood Culture - Blood, Hand, Left [[id number]] Collected: 11/11/24 1240    Specimen: Blood from Hand, Left Updated: 11/11/24 1303    POC Glucose Once [[id number]]  (Abnormal) Collected: 11/11/24 1214    Specimen: Blood Updated: 11/11/24 1215     Glucose 140 mg/dL     Tissue Pathology Exam [[id number]] Collected: 11/10/24 0851    Specimen: Tissue from Gallbladder Updated: 11/11/24 0710    Blood Culture - Blood, Arm, Left [[id number]]  (Normal) Collected: 11/08/24 0525    Specimen: Blood from Arm, Left Updated: 11/11/24 0600     Blood Culture No growth at 3 days    Narrative:      Less than seven (7) mL's of blood was collected.  Insufficient quantity may yield false negative results.    Blood Culture - Blood, Hand, Left [[id number]]  (Normal) Collected: 11/08/24 0530    Specimen: Blood from Hand, Left Updated: 11/11/24 0600     Blood Culture No growth at 3 days    Narrative:      Less than seven (7) mL's of blood was collected.  Insufficient quantity may yield false negative results.    Lipase [[id number]]  (Abnormal) Collected: 11/11/24 0132    Specimen: Blood Updated: 11/11/24 0239     Lipase 1,257 U/L     C-reactive Protein [[id number]]  (Abnormal) Collected: 11/11/24 0132    Specimen: Blood Updated: 11/11/24 0227     C-Reactive Protein 13.18 mg/dL     Comprehensive Metabolic Panel [[id number]]  (Abnormal) Collected: 11/11/24 0132    Specimen: Blood Updated: 11/11/24 0227     Glucose 155 mg/dL      BUN 11 mg/dL      Creatinine 0.63 mg/dL      Sodium 135 mmol/L      Potassium 4.3 mmol/L      Chloride 103 mmol/L      CO2 23.0 mmol/L      Calcium 7.9 mg/dL      Total Protein 5.6 g/dL      Albumin 3.2 g/dL      ALT (SGPT) 195 U/L      AST (SGOT) 39 U/L      Alkaline Phosphatase 183 U/L      Total Bilirubin 1.7 mg/dL      Globulin 2.4 gm/dL      Comment: Calculated Result        A/G  Ratio 1.3 g/dL      BUN/Creatinine Ratio 17.5     Anion Gap 9.0 mmol/L      eGFR 129.6 mL/min/1.73     Narrative:      GFR Normal >60  Chronic Kidney Disease <60  Kidney Failure <15      Magnesium [[id number]]  (Normal) Collected: 11/11/24 0132    Specimen: Blood Updated: 11/11/24 0227     Magnesium 1.7 mg/dL     CBC & Differential [[id number]]  (Abnormal) Collected: 11/11/24 0132    Specimen: Blood Updated: 11/11/24 0212    Narrative:      The following orders were created for panel order CBC & Differential.  Procedure                               Abnormality         Status                     ---------                               -----------         ------                     CBC Auto Differential[[id number]]        Abnormal            Final result               Scan Slide[[id number]]                   Normal              Final result                 Please view results for these tests on the individual orders.    Scan Slide [[id number]]  (Normal) Collected: 11/11/24 0132    Specimen: Blood Updated: 11/11/24 0212     RBC Morphology Normal     WBC Morphology Normal     Platelet Morphology Normal    CBC Auto Differential [[id number]]  (Abnormal) Collected: 11/11/24 0132    Specimen: Blood Updated: 11/11/24 0212     WBC 31.11 10*3/mm3      RBC 5.37 10*6/mm3      Hemoglobin 16.3 g/dL      Hematocrit 49.3 %      MCV 91.8 fL      MCH 30.4 pg      MCHC 33.1 g/dL      RDW 13.6 %      RDW-SD 46.5 fl      MPV 10.7 fL      Platelets 483 10*3/mm3      Neutrophil % 87.4 %      Lymphocyte % 3.9 %      Monocyte % 7.8 %      Eosinophil % 0.0 %      Basophil % 0.3 %      Immature Grans % 0.6 %      Neutrophils, Absolute 27.19 10*3/mm3      Lymphocytes, Absolute 1.22 10*3/mm3      Monocytes, Absolute 2.44 10*3/mm3      Eosinophils, Absolute 0.00 10*3/mm3      Basophils, Absolute 0.08 10*3/mm3      Immature Grans, Absolute 0.18 10*3/mm3      nRBC 0.0 /100 WBC     Narrative:      Appended report. These results have been appended to a  "previously verified report.    Procalcitonin [[id number]]  (Normal) Collected: 11/10/24 0522    Specimen: Blood Updated: 11/10/24 0810     Procalcitonin 0.04 ng/mL     Narrative:      As a Marker for Sepsis (Non-Neonates):    1. <0.5 ng/mL represents a low risk of severe sepsis and/or septic shock.  2. >2 ng/mL represents a high risk of severe sepsis and/or septic shock.    As a Marker for Lower Respiratory Tract Infections that require antibiotic therapy:    PCT on Admission    Antibiotic Therapy       6-12 Hrs later    >0.5                Strongly Recommended  >0.25 - <0.5        Recommended   0.1 - 0.25          Discouraged              Remeasure/reassess PCT  <0.1                Strongly Discouraged     Remeasure/reassess PCT    As 28 day mortality risk marker: \"Change in Procalcitonin Result\" (>80% or <=80%) if Day 0 (or Day 1) and Day 4 values are available. Refer to http://www.[email or web address] [place]    Change in PCT <=80%  A decrease of PCT levels below or equal to 80% defines a positive change in PCT test result representing a higher risk for 28-day all-cause mortality of patients diagnosed with severe sepsis for septic shock.    Change in PCT >80%  A decrease of PCT levels of more than 80% defines a negative change in PCT result representing a lower risk for 28-day all-cause mortality of patients diagnosed with severe sepsis or septic shock.       C-reactive Protein [[id number]]  (Abnormal) Collected: 11/10/24 0522    Specimen: Blood Updated: 11/10/24 0804     C-Reactive Protein 1.82 mg/dL     Lipase [[id number]]  (Abnormal) Collected: 11/10/24 0522    Specimen: Blood Updated: 11/10/24 0720     Lipase 2,820 U/L     Comprehensive Metabolic Panel [[id number]]  (Abnormal) Collected: 11/10/24 0522    Specimen: Blood Updated: 11/10/24 0704     Glucose 166 mg/dL      BUN 10 mg/dL      Creatinine 0.71 mg/dL      Sodium 136 mmol/L      Potassium 4.7 mmol/L      Chloride 97 mmol/L      CO2 23.0 mmol/L      Calcium " 9.4 mg/dL      Total Protein 6.4 g/dL      Albumin 4.2 g/dL      ALT (SGPT) 351 U/L      AST (SGOT) 86 U/L      Alkaline Phosphatase 294 U/L      Total Bilirubin 2.4 mg/dL      Globulin 2.2 gm/dL      Comment: Calculated Result        A/G Ratio 1.9 g/dL      BUN/Creatinine Ratio 14.1     Anion Gap 16.0 mmol/L      eGFR 124.2 mL/min/1.73     Narrative:      GFR Normal >60  Chronic Kidney Disease <60  Kidney Failure <15      Protime-INR [[id number]]  (Normal) Collected: 11/10/24 0522    Specimen: Blood Updated: 11/10/24 0631     Protime 13.5 Seconds      INR 1.02    CBC & Differential [[id number]]  (Abnormal) Collected: 11/10/24 0522    Specimen: Blood Updated: 11/10/24 0629    Narrative:      The following orders were created for panel order CBC & Differential.  Procedure                               Abnormality         Status                     ---------                               -----------         ------                     CBC Auto Differential[[id number]]        Abnormal            Final result                 Please view results for these tests on the individual orders.    CBC Auto Differential [[id number]]  (Abnormal) Collected: 11/10/24 0522    Specimen: Blood Updated: 11/10/24 0629     WBC 18.12 10*3/mm3      RBC 5.20 10*6/mm3      Hemoglobin 15.8 g/dL      Hematocrit 47.6 %      MCV 91.5 fL      MCH 30.4 pg      MCHC 33.2 g/dL      RDW 13.2 %      RDW-SD 44.8 fl      MPV 11.2 fL      Platelets 450 10*3/mm3      Neutrophil % 87.3 %      Lymphocyte % 4.2 %      Monocyte % 7.7 %      Eosinophil % 0.0 %      Basophil % 0.2 %      Immature Grans % 0.6 %      Neutrophils, Absolute 15.84 10*3/mm3      Lymphocytes, Absolute 0.76 10*3/mm3      Monocytes, Absolute 1.39 10*3/mm3      Eosinophils, Absolute 0.00 10*3/mm3      Basophils, Absolute 0.03 10*3/mm3      Immature Grans, Absolute 0.10 10*3/mm3      nRBC 0.0 /100 WBC           Imaging Results (Last 48 Hours)       Procedure Component Value Units Date/Time     CT Angiogram Chest [[id number]] Collected: 11/11/24 1329     Updated: 11/11/24 1344    Narrative:      CT ANGIOGRAM CHEST, CT ABD W CONTRAST PELVIS W WO CONTRAST    Date of Exam: 11/11/2024 12:46 PM EST    Indication: pleuritic chest pasin, tachycardia.    Comparison: CT abdomen and pelvis of 11/8/2024.    Technique: CTA of the chest was performed before and after the uneventful intravenous administration of 85 mL Isovue-370. Reconstructed coronal and sagittal images were also obtained. In addition, a 3-D volume rendered image was created for   interpretation. Automated exposure control and iterative reconstruction methods were used.      Findings:  CT chest: There are no filling defects suspicious for pulmonary emboli. There are no enlarged mediastinal nodes. There are no hilar masses. There are small bilateral pleural effusions. There is compressive atelectasis in the lung bases. The heart size is   normal. There is no pericardial effusion.    CT abdomen and pelvis: The gallbladder has been removed. An oval nonenhancing fluid collection is identified in the gallbladder fossa measuring 6 x 2 cm. There is a surgical drain entering the right abdomen having its tip near the gallbladder fossa.   There is new moderately large amount of abdominal ascites and mild amount of pelvic ascites. There is peripancreatic soft tissue stranding and fluid. There is no evidence of a focal enhancing or encapsulated fluid collection. There is no bile duct   dilatation. There are no focal liver lesions. Spleen and adrenal glands appear normal. There are no renal lesions or hydronephrosis. Partially filled bladder appears grossly normal. There is no pelvic mass. There is no large or small bowel dilatation.   There is a small amount of free intraperitoneal air identified over the left hepatic lobe. The stomach is collapsed.      Impression:      Negative exam for pulmonary embolism. Small bilateral pleural effusions with mild compressive  bibasilar atelectasis.    Findings compatible with acute pancreatitis. Moderate amount of abdominal and small amount of pelvic ascites. Small nonencapsulated fluid collection in the gallbladder fossa, status post cholecystectomy. Small amount of free air is compatible with recent   surgery. Surgical drain is in place.      Electronically Signed: Kristi Matta MD    11/11/2024 1:40 PM EST    Workstation ID: [id number]    CT Abd With Contrast Pelvis With & Without Contrast [[id number]] Collected: 11/11/24 1329     Updated: 11/11/24 1344    Narrative:      CT ANGIOGRAM CHEST, CT ABD W CONTRAST PELVIS W WO CONTRAST    Date of Exam: 11/11/2024 12:46 PM EST    Indication: pleuritic chest pasin, tachycardia.    Comparison: CT abdomen and pelvis of 11/8/2024.    Technique: CTA of the chest was performed before and after the uneventful intravenous administration of 85 mL Isovue-370. Reconstructed coronal and sagittal images were also obtained. In addition, a 3-D volume rendered image was created for   interpretation. Automated exposure control and iterative reconstruction methods were used.      Findings:  CT chest: There are no filling defects suspicious for pulmonary emboli. There are no enlarged mediastinal nodes. There are no hilar masses. There are small bilateral pleural effusions. There is compressive atelectasis in the lung bases. The heart size is   normal. There is no pericardial effusion.    CT abdomen and pelvis: The gallbladder has been removed. An oval nonenhancing fluid collection is identified in the gallbladder fossa measuring 6 x 2 cm. There is a surgical drain entering the right abdomen having its tip near the gallbladder fossa.   There is new moderately large amount of abdominal ascites and mild amount of pelvic ascites. There is peripancreatic soft tissue stranding and fluid. There is no evidence of a focal enhancing or encapsulated fluid collection. There is no bile duct   dilatation. There are no focal  liver lesions. Spleen and adrenal glands appear normal. There are no renal lesions or hydronephrosis. Partially filled bladder appears grossly normal. There is no pelvic mass. There is no large or small bowel dilatation.   There is a small amount of free intraperitoneal air identified over the left hepatic lobe. The stomach is collapsed.      Impression:      Negative exam for pulmonary embolism. Small bilateral pleural effusions with mild compressive bibasilar atelectasis.    Findings compatible with acute pancreatitis. Moderate amount of abdominal and small amount of pelvic ascites. Small nonencapsulated fluid collection in the gallbladder fossa, status post cholecystectomy. Small amount of free air is compatible with recent   surgery. Surgical drain is in place.      Electronically Signed: Kristi Matta MD    11/11/2024 1:40 PM EST    Workstation ID: [id number]    FL ERCP pancreatic and biliary ducts [[id number]] Collected: 11/11/24 0949     Updated: 11/11/24 1023    Narrative:      FL ERCP PANCREATIC AND BILIARY DUCTS    Date of Exam: 11/9/2024 8:00 AM EST    Indication: ENDOSCOPIC RETROGRADE CHOLANGIOPANCREATOGRAPHY.       Comparison: None available.    Technique:  A series of radiographic digital spot films were obtained in conjunction with an endoscopic catheterization of the biliary and pancreatic ductal system, performed by the gastroenterologist.    Fluoroscopic Time: 1 minute 4 seconds    Number of Images: 11    Findings:  Fluoroscopy demonstrating filling of prominent bile ducts.      Impression:      Impression:  Fluoroscopy demonstrating filling of prominent bile ducts. Please see procedure report for full findings.      Electronically Signed: William Ingram MD    11/11/2024 10:20 AM EST    Workstation ID: [id number]          ECG/EMG Results (last 48 hours)       Procedure Component Value Units Date/Time    ECG 12 Lead Tachycardia [[id number]] Collected: 11/11/24 0108     Updated: 11/11/24 0705     QT  Interval 302 ms     Narrative:      Test Reason : Tachycardia  Blood Pressure :   */*   mmHG  Vent. Rate : 123 BPM     Atrial Rate : 123 BPM     P-R Int : 130 ms          QRS Dur :  76 ms      QT Int : 302 ms       P-R-T Axes :  56  28  12 degrees    QTcB Int : 432 ms    Sinus tachycardia  Otherwise normal ECG  No previous ECGs available    Referred By:            Confirmed By:     ECG 12 Lead Tachycardia [[id number]] Collected: 11/11/24 1219     Updated: 11/11/24 1236     QT Interval 302 ms     Narrative:      Test Reason : Tachycardia  Blood Pressure :   */*   mmHG  Vent. Rate : 132 BPM     Atrial Rate : 132 BPM     P-R Int : 126 ms          QRS Dur :  76 ms      QT Int : 302 ms       P-R-T Axes :  38  18  11 degrees    QTcB Int : 447 ms    Sinus tachycardia  Nonspecific T wave abnormality  Abnormal ECG  When compared with ECG of 11-Nov-2024 12:17, (Unconfirmed)  No significant change was found    Referred By:            Confirmed By:              Operative/Procedure Notes (all)        Procedures signed by Dick Patrick MD at 11/09/24 0907   Version 1 of 1       Procedure Orders    1. ERCP [[id number]] ordered by Dick Patrick MD at 11/09/24 0652               [Media Unavailable] Scan on 11/9/2024 0906 by Dick Patrick MD: ERCP          Electronically signed by Dick Patrick MD at 11/09/24 0907       Raheem Raza MD at 11/10/24 0815  Version 1 of 1         Operative Report    Patient Name:  Eder Wills  YOB: 2000  [id number]  11/10/2024      PREOPERATIVE DIAGNOSIS: Acute cholecystitis, choledocholithiasis, pancreatitis      POSTOPERATIVE DIAGNOSIS: Same        PROCEDURE PERFORMED:     Laparoscopic cholecystectomy        SURGEON: Raheem Raza MD      ASSISTANT: None        SPECIMENS: Gallbladder and contents       ESTIMATED BLOOD LOSS: 20 mL       ANESTHESIA: General.        FINDINGS:     1. Critical view of safety established prior to ligation of cystic structures  2.  There was  significant retroperitoneal edema consistent with pancreatitis as well as some small simple ascites fluid throughout the right upper quadrant.       INDICATIONS:      The patient is a 23 y.o. female with a history of abdominal pain and a clinical diagnosis consistent with acute cholecystitis and choledocholithiasis. Pre-operative imaging scan confirmed the diagnosis.  She underwent ERCP on November 9 with clearance of her bile duct.  She was noted to have some mild pancreatitis but her exam was unchanged and she was recommended to proceed forward with cholecystectomy.  The risks, benefits and alternatives of laparoscopic cholecystectomy were discussed with the patient and their family preoperatively and they agreed to proceed.        DESCRIPTION OF PROCEDURE:     The patient was taken to the operating room and positioned supine on the operating room table. General anesthesia was initiated. The patient was prepped and draped in the usual sterile fashion. Antibiotics were given preoperatively. SCDs were properly placed on the patient and turned on. An orogastric tube was placed by the anesthesiologist with return of gastric content prior to start of the case.     Local anesthetic was injected prior to all skin incisions. A periumbilical skin incision was then created inferior to the umbilicus utilizing an 11 blade scalpel. Blunt dissection was carried down to the level of the anterior abdominal wall fascia and the base of the umbilicus. The base of the umbilicus was then grasped and elevated with a Kocher clamp. A vertical incision was then made in the anterior abdominal wall fascia under direct visualization sharply. Following this, the peritoneal cavity was then entered under direct visualization. Stay sutures of 0 Vicryl were placed around the defect, and a 12 mm Tej trocar was then advanced without difficulty into the abdominal cavity. Pneumoperitoneum was established at 15 mmHg. The abdomen was then surveyed  with a 10mm 30 degree laparoscope, with special attention to the viscera underlying the insertion site which was found to be free of injury.  Next, under direct laparoscopic visualization three additional 5 mm trocars were placed in the right upper quadrant. The patient was then positioned in the head-up position with the table tilted to the left.    Survey of the abdominal cavity demonstrated significant retroperitoneal edema consistent with pancreatitis.  There was some simple ascites fluid throughout the upper abdomen.  There were omental adhesions to the gallbladder which were taken down using combination of blunt dissection as well as Bovie electrocautery.  With this completed the fundus of the gallbladder was retracted cephalad while the infundibulum of the gallbladder was retracted laterally. Using a combination of hook cautery as well as a [place] dissector, the peritoneum surrounding the cystic pedicle was stripped. Cystic structures were dissected. A critical view of safety was established, meanin and only 2 structures were visualized entering the gallbladder, all fibrous and fatty tissue between the cystic artery and cystic duct were cleared, and the posterior one-third of the gallbladder was removed from the cystic plate. Next 2 clips were placed on the distal cystic duct, 1 clip was placed on the proximal cystic duct, and the duct was transected with laparoscopic scissors. Next 2 clips were placed on the proximal cystic artery, 1 clip was placed on the distal cystic artery and this was transected with laparoscopic scissors.  A single PDS Endoloop was applied to the cystic duct stump below the clips.  Next the gallbladder was removed from the cystic plate using hook electrocautery.      A 5 mm 30 degree laparoscope was then inserted through the subxiphoid trocar site to visualize the placement of the gallbladder within an Endo Catch bag and then extraction of the gallbladder through the  periumbilical trocar site utilizing the Endo Catch bag. Instruments were returned to their native positions. Hemostasis of the cystic plate was confirmed using electrocautery.  Having the ascites fluid within the right upper quadrant as well as a significant retroperitoneal edema, I felt that it was in the best interest to leave a drain in place within the right upper quadrant.  A 10 flat BRUNO drain was then brought into the abdomen and exited out of the right most lateral trocar site.  This was left in place within the right upper quadrant adjacent to the cystic plate. The clipped cystic structures were carefully examined and there was no sign of bilious or bloody drainage. The table was then leveled and limited irrigation of the right upper quadrant was performed with normal saline and hemostasis again confirmed. Next, the 5 mm trocars were removed under direct laparoscopic visualization. The 12 mm trocar was then withdrawn and pneumoperitoneum was released.     The fascia of the periumbilical trocar site was then closed in a figure-of-eight fashion with an 0 Vicryl suture. All wound sites were irrigated and hemostasis confirmed. The skin incisions were then closed using a 4-0 Monocryl suture in the subcuticular layer. Mastisol and Steri-Strips were then applied to each incision site with a clean and dry dressing over this.    After the procedure, the patient was awakened, extubated, and taken to the postoperative anesthesia care unit for recovery. All needle, instrument, and lap counts were correct. I was personally present and performed all portions of the procedure. There were no immediate complications         Raheem Raza MD  11/10/2024  09:10 EST      Electronically signed by Raheem Raza MD at 11/10/24 0912          Physician Progress Notes (last 4 days)        Hemalatha Hobbs APRN at 11/11/24 1120              [facility] Medicine Services  PROGRESS NOTE    Patient Name:  Eder Wills  : 2000  MRN: [id number]    Date of Admission: 2024  Primary Care Physician: Eva Tovar MD    Subjective   Subjective     CC:  F/u abdominal pain, vomiting    HPI:  Labs noted from overnight, continue   Afebrile, +sweats    Re-eval 1230 after rapid called, pt awake alert, color improved.  No changes with pain. Reports pleuritic chest pain.  Remains tachycardic. Pt more concerned with what is going on with her mom at bedside who had a syncopal episode. Will move to tele after imaging    Objective   Objective     Vital Signs:   Temp:  [97.2 °F (36.2 °C)-98.7 °F (37.1 °C)] 98 °F (36.7 °C)  Heart Rate:  [] 139  Resp:  [17-43] 43  BP: (119-129)/(71-95) 129/88     Physical Exam:  Constitutional: No acute distress, pale, lying in bed, appears uncomfortable but states no changes with amount of pain  HENT: NCAT, mucous membranes moist  Respiratory: Clear to auscultation bilaterally, respiratory effort normal   Cardiovascular: tachycardic, no m/r/g  Gastrointestinal: Positive bowel sounds, soft, nontender, nondistended  Musculoskeletal: No bilateral ankle edema  Psychiatric: Flat affect, cooperative  Neurologic: Oriented x 3, THOMPSON, speech clear  Skin: No rashes        Results Reviewed:  LAB RESULTS:      Lab 24  0132 11/10/24  0522 24  0527 24  2240   WBC 31.11* 18.12* 8.62 9.78   HEMOGLOBIN 16.3* 15.8 12.3 12.4   HEMATOCRIT 49.3* 47.6* 37.6 38.0   PLATELETS 483* 450 315 337   NEUTROS ABS 27.19* 15.84* 5.43 7.46*   IMMATURE GRANS (ABS) 0.18* 0.10* 0.02 0.03   LYMPHS ABS 1.22 0.76 2.20 1.46   MONOS ABS 2.44* 1.39* 0.77 0.65   EOS ABS 0.00 0.00 0.16 0.14   MCV 91.8 91.5 92.4 92.5   CRP 13.18* 1.82*  --   --    PROCALCITONIN  --  0.04  --  0.12   LACTATE  --   --   --  1.1   PROTIME  --  13.5  --   --          Lab 24  0132 11/10/24  0524  0951 24  0527 24  9550   SODIUM 135* 136 139 147* 143   POTASSIUM 4.3 4.7 4.1 4.4 4.1   CHLORIDE  103 97* 106 112* 108*   CO2 23.0 23.0 21.0* 21.0* 21.0*   ANION GAP 9.0 16.0* 12.0 14.0 14.0   BUN 11 10 10 9 9   CREATININE 0.63 0.71 0.68 0.77 0.83   EGFR 129.6 124.2 127.3 112.7 103.0   GLUCOSE 155* 166* 102* 91 117*   CALCIUM 7.9* 9.4 9.1 9.1 9.1   MAGNESIUM 1.7  --   --   --   --          Lab 11/11/24  0132 11/10/24  0522 11/09/24  0951 11/08/24  0527 11/07/24  2240   TOTAL PROTEIN 5.6* 6.4 6.4 6.8 7.0   ALBUMIN 3.2* 4.2 4.0 3.9 4.2   GLOBULIN 2.4 2.2 2.4 2.9 2.8   ALT (SGPT) 195* 351* 432* 467* 519*   AST (SGOT) 39* 86* 155* 142* 177*   BILIRUBIN 1.7* 2.4* 2.4* 2.9* 3.2*   ALK PHOS 183* 294* 258* 312* 325*   LIPASE 1,257* 2,820*  --   --  60         Lab 11/10/24  0522   PROTIME 13.5   INR 1.02                 Brief Urine Lab Results  (Last result in the past 365 days)        Color   Clarity   Blood   Leuk Est   Nitrite   Protein   CREAT   Urine HCG        11/08/24 0113 Yellow   Clear   Negative   Negative   Negative   Negative                   Microbiology Results Abnormal       None            No radiology results from the last 24 hrs        Current medications:  Scheduled Meds:bisacodyl, 10 mg, Oral, Daily  docusate sodium, 100 mg, Oral, BID  FLUoxetine, 40 mg, Oral, Daily  heparin (porcine), 5,000 Units, Subcutaneous, Q8H  metoclopramide, 10 mg, Intravenous, Q6H  pantoprazole, 40 mg, Intravenous, Q AM  Pharmacy Consult for Metronidazole IV, 500 mg, Does not apply, Q8H  piperacillin-tazobactam, 3.375 g, Intravenous, Q8H      Continuous Infusions:lactated ringers, 150 mL/hr, Last Rate: 150 mL/hr (11/11/24 1118)      PRN Meds:.  acetaminophen    HYDROcodone-acetaminophen    HYDROmorphone **AND** naloxone    Morphine    ondansetron ODT **OR** ondansetron    oxyCODONE-acetaminophen    promethazine    simethicone    [COMPLETED] Insert Peripheral IV **AND** sodium chloride    Assessment & Plan   Assessment & Plan     Active Hospital Problems    Diagnosis  POA    **Biliary obstruction [K83.1]  Yes    Obesity (BMI  30-39.9) [E66.9]  Yes    Post-ERCP acute pancreatitis [K91.89, K85.90]  No    Calculus of gallbladder and bile duct with obstruction [K80.71]  Yes    Mood disorder [F39]  Yes      Resolved Hospital Problems   No resolved problems to display.        Brief Hospital Course to date:  Eder Wills is a 23 y.o. female with no PMH, who was recently diagnosed with cholelithiasis after 6 weeks of abdominal/ right sided back pain and was scheduled for surgical evaluation. However, patient presented to [facility] ED 11/7 for worsening abdominal pain associated with N/V. MRCP notes choledocholithiasis. GI and Gen surgery consulted.     This patient's problems and plans were partially entered by my partner and updated as appropriate by me 11/11/24.     Choledocholithiasis  Cholecystitis  -- LFTs, T Bili trending down  -- ERCP 11/9 with Dr. Patrick revealed dilated main bile duct, no stones or filling defects  -- lap CCY 11/10 with Dr. Raza  -- pain and nausea control. Rates generalized abdominal pain 5/10  -- Rocephin/Flagyl changed to zosyn  -- continue bowel regimen  -- CT abd/pelvis pending    Post ERCP Pancreatitis  -- elevated lipase 2,820, s/p ERCP 11/9. Down to 1,257 11/11  -- WBCs jumped to 31K this am, IVF added overnight.  Repeat pending  -- CRP up to 13.18  -- continue zosyn  -- IV LR @ 150 ml/hr, continue  -- tolerating clear liquids    Pleuritic chest pain  -- sinus tach EKG  -- CT angio pending    Expected Discharge Location and Transportation: Home  Expected Discharge   Expected Discharge Date: 11/12/2024; Expected Discharge Time:      VTE Prophylaxis:  Pharmacologic & mechanical VTE prophylaxis orders are present.         AM-PAC 6 Clicks Score (PT): 21 (11/11/24 7828)    CODE STATUS:   Code Status and Medical Interventions: CPR (Attempt to Resuscitate); Full Support   Ordered at: 11/08/24 0236     Level Of Support Discussed With:    Patient     Code Status (Patient has no pulse and is not breathing):    CPR (Attempt  "to Resuscitate)     Medical Interventions (Patient has pulse or is breathing):    Full Support       MARITZA Masters  11/11/24        Electronically signed by Hemalatha Hobbs APRN at 11/11/24 1321       Raheem Raza MD at 11/11/24 0659          Patient Name:  Eder Wills  YOB: 2000  [id number]    Surgery Progress Note    Date of visit: 11/11/2024      Subjective: Tachycardic overnight.  Overall she tells me that she feels better than yesterday.  She reports slightly less pain.  Still reports diffuse abdominal discomfort and some bloating.  No nausea or vomiting.  Asking for clear liquids.          Objective:     /89 (BP Location: Left arm, Patient Position: Lying)   Pulse (!) 121   Temp 98.7 °F (37.1 °C) (Oral)   Resp 18   Ht 157.5 cm (62\")   Wt 84.4 kg (186 lb)   LMP 10/29/2024 (Approximate)   SpO2 95%   BMI 34.02 kg/m²     Intake/Output Summary (Last 24 hours) at 11/11/2024 0659  Last data filed at 11/11/2024 0431  Gross per 24 hour   Intake 2000 ml   Output 1760 ml   Net 240 ml       GEN:   Awake, alert, in no acute distress, resting comfortably in bed   CV:   Regular rate and rhythm  L:  Symmetric expansion, not labored on room air  Abd:  Soft, mildly distended throughout, incisions are clean dry and intact, there is some tenderness to palpation diffusely throughout the abdomen without rebound or guarding and some appropriate incisional site tenderness, BRUNO drain in the right upper quadrant is serosanguineous and not bilious  Ext:  No cyanosis, clubbing, or edema    Recent labs that are back at this time have been reviewed.           Assessment/ Plan:    Mrs. Wills is a 23-year-old lady without significant past medical history with choledocholithiasis      # Choledocholithiasis  # Pancreatitis  -Status post ERCP on November 9  -Postop day 1 following laparoscopic cholecystectomy.  -More tachycardic and appears intravascularly depleted with an increased hemoglobin at 16.3 " "and an increased leukocytosis of 31.  Her liver function tests are actually downtrending however.   -BRUNO drain with copious serous output.  760 mL yesterday.  Draining ascites fluid related to her pancreatitis  -Needs aggressive IV fluid resuscitation.  I will order for a 1 L lactated Ringer's bolus  -Okay for some small clears for comfort  -Continue with supportive management of pancreatitis         Raheem Raza MD  11/11/2024  06:59 EST        Electronically signed by Raheem Raza MD at 11/11/24 0703       Dick Patrick MD at 11/10/24 1201          GI Daily Progress Note  Subjective:    Chief Complaint:  follow up abdominal pain    Patient with abdominal pain overnight that reportedly was similar to presenting pain.  She is s/p CCY this morning and is resting and somewhat drowsy this morning.  She reports abdominal pain with some nausea.  Family at bedside.    Objective:    /90 (BP Location: Left arm, Patient Position: Lying)   Pulse 98   Temp 97.4 °F (36.3 °C) (Temporal)   Resp 17   Ht 157.5 cm (62\")   Wt 84.4 kg (186 lb)   LMP 10/29/2024 (Approximate)   SpO2 98%   BMI 34.02 kg/m²     Physical Exam   General: Patient awake, alert and cooperative   Eyes: Normal lids and lashes,    Skin: Warm and dry, not jaundiced   Cardiovascular: Regular rate,  well-perfused extremities   Pulm: Equal expansion bilaterally, no increased WOB   Abdomen: mildly distended post CCY;               Neuro: Somewhat drwosy    Lab  Lab Results   Component Value Date    WBC 18.12 (H) 11/10/2024    HGB 15.8 11/10/2024    HGB 12.3 11/08/2024    HGB 12.4 11/07/2024    MCV 91.5 11/10/2024     11/10/2024    INR 1.02 11/10/2024       Lab Results   Component Value Date    GLUCOSE 166 (H) 11/10/2024    BUN 10 11/10/2024    CREATININE 0.71 11/10/2024    BCR 14.1 11/10/2024     11/10/2024    K 4.7 11/10/2024    CO2 23.0 11/10/2024    CALCIUM 9.4 11/10/2024    ALBUMIN 4.2 11/10/2024    ALKPHOS 294 (H) 11/10/2024 " "   BILITOT 2.4 (H) 11/10/2024     (H) 11/10/2024    AST 86 (H) 11/10/2024       Assessment:  Choledocholithiasis and Cholelithiasis  Elevated LFTS and Tbili  Leukocytosis  Post-ERCP Pancreatitis (ERCP with biliary sphincterotomy and balloon sweep 11/9)    Plan:  - Signs/symptoms today c/w post-ERCP pancreatitis which clinically seemed mild this AM and she was taken for CCY  - IV LR at 150/hr overnight  - Anti-emetics and pain medications as needed  - Bowel regimen  - CLD if tolerated  - Continue to monitor CBC, CMP     Will continue to follow.  I discussed plan with patient and her family at bedside this morning.    Dick Patrick MD  11/10/24  12:01 EST       Electronically signed by Dick Patrick MD at 11/10/24 1209       Raheem Raza MD at 11/10/24 0705          Patient Name:  Eder Wills  YOB: 2000  [id number]    Surgery Progress Note    Date of visit: 11/10/2024      Subjective: No significant changes.  Still with some pain across the upper and lower part of the abdomen.  Some nausea but no vomiting.  Symptoms not significantly changed since before the ERCP.  Denies fevers or chills          Objective:     /88 (BP Location: Left arm, Patient Position: Lying)   Pulse 90   Temp 97.5 °F (36.4 °C) (Temporal)   Resp 16   Ht 157.5 cm (62\")   Wt 84.4 kg (186 lb)   LMP 10/29/2024 (Approximate)   SpO2 98%   BMI 34.02 kg/m²     Intake/Output Summary (Last 24 hours) at 11/10/2024 0733  Last data filed at 11/10/2024 0345  Gross per 24 hour   Intake 500 ml   Output 400 ml   Net 100 ml       GEN:   Awake, alert, in no acute distress, resting comfortably in bed   CV:   Regular rate and rhythm  L:  Symmetric expansion, not labored on room air  Abd:  Soft, not distended, some mild tenderness to deep palpation throughout the abdomen  Ext:  No cyanosis, clubbing, or edema    Recent labs that are back at this time have been reviewed.           Assessment/ Plan:    Mrs. Wills is a " 23-year-old lady without significant past medical history with choledocholithiasis      # Choledocholithiasis  -Underwent ERCP yesterday  -New leukocytosis to 18,000.  Liver function tests are unchanged since yesterday.  Lipase is 2800.  May have some mild post ERCP pancreatitis, however she reports that her symptoms are largely unchanged since before her procedure and she remains soft to palpation throughout.  I had a long discussion with the patient as well as the family, and they wish to proceed forward with cholecystectomy    I had a long discussion with the patient and their family regarding the risks, benefits, and alternatives to the procedure including specifically discussing the risks of bleeding, infection, damage to adjacent structures, possible need for further operations, possible need for conversion to an open operation, risks of common bile duct injury and risk of bile leak after the procedure. They wish to proceed.         Raheem Raza MD  11/10/2024  07:33 EST        Electronically signed by Raheem Raza MD at 11/10/24 0735       Latasha Nance APRN at 11/10/24 0732              [facility] Medicine Services  PROGRESS NOTE    Patient Name: Eder Wills  : 2000  MRN: [id number]    Date of Admission: 2024  Primary Care Physician: Eva Tovar MD    Subjective   Subjective     CC:  F/u abdominal pain, vomiting    HPI:  Patient resting in bed, sleeping, awakes to voice.  Has returned from cholecystectomy.  On supplemental O2.  Says she is having some pain.  Family at bedside.  Discussed plan to keep patient n.p.o. for now given lab findings concerning for pancreatitis.      Objective   Objective     Vital Signs:   Temp:  [96.8 °F (36 °C)-98.2 °F (36.8 °C)] 98.2 °F (36.8 °C)  Heart Rate:  [] 101  Resp:  [16-21] 21  BP: ()/(59-88) 129/87  Flow (L/min) (Oxygen Therapy):  [3] 3     Physical Exam:  Constitutional: No acute  distress, sleeping, awakes to voice  HENT: NCAT, mucous membranes moist  Respiratory: Diminished to auscultation at bases bilaterally, respiratory effort normal, 2L O2  Cardiovascular: RRR, no murmurs, rubs, or gallops  Gastrointestinal: Absent bowel sounds, rounded, lap-choley sites CDI, BRUNO drain with sanguinous drainage  Musculoskeletal: No bilateral ankle edema  Psychiatric: Appropriate affect, cooperative  Neurologic: Oriented x 3, moves all extremities, speech clear  Skin: No rashes      Results Reviewed:  LAB RESULTS:      Lab 11/10/24  0522 11/08/24  0527 11/07/24 2240   WBC 18.12* 8.62 9.78   HEMOGLOBIN 15.8 12.3 12.4   HEMATOCRIT 47.6* 37.6 38.0   PLATELETS 450 315 337   NEUTROS ABS 15.84* 5.43 7.46*   IMMATURE GRANS (ABS) 0.10* 0.02 0.03   LYMPHS ABS 0.76 2.20 1.46   MONOS ABS 1.39* 0.77 0.65   EOS ABS 0.00 0.16 0.14   MCV 91.5 92.4 92.5   PROCALCITONIN  --   --  0.12   LACTATE  --   --  1.1   PROTIME 13.5  --   --          Lab 11/10/24  0522 11/09/24  0951 11/08/24  0527 11/07/24  2240   SODIUM 136 139 147* 143   POTASSIUM 4.7 4.1 4.4 4.1   CHLORIDE 97* 106 112* 108*   CO2 23.0 21.0* 21.0* 21.0*   ANION GAP 16.0* 12.0 14.0 14.0   BUN 10 10 9 9   CREATININE 0.71 0.68 0.77 0.83   EGFR 124.2 127.3 112.7 103.0   GLUCOSE 166* 102* 91 117*   CALCIUM 9.4 9.1 9.1 9.1         Lab 11/10/24  0522 11/09/24  0951 11/08/24  0527 11/07/24  2240   TOTAL PROTEIN 6.4 6.4 6.8 7.0   ALBUMIN 4.2 4.0 3.9 4.2   GLOBULIN 2.2 2.4 2.9 2.8   ALT (SGPT) 351* 432* 467* 519*   AST (SGOT) 86* 155* 142* 177*   BILIRUBIN 2.4* 2.4* 2.9* 3.2*   ALK PHOS 294* 258* 312* 325*   LIPASE 2,820*  --   --  60         Lab 11/10/24  0522   PROTIME 13.5   INR 1.02                 Brief Urine Lab Results  (Last result in the past 365 days)        Color   Clarity   Blood   Leuk Est   Nitrite   Protein   CREAT   Urine HCG        11/08/24 0113 Yellow   Clear   Negative   Negative   Negative   Negative                   Microbiology Results Abnormal        None            No radiology results from the last 24 hrs        Current medications:  Scheduled Meds:cefTRIAXone, 2,000 mg, Intravenous, Q24H  famotidine, , ,   FLUoxetine, 40 mg, Oral, Daily  metoclopramide, 10 mg, Intravenous, Q6H  metroNIDAZOLE, 500 mg, Oral, Q8H  senna-docusate sodium, 2 tablet, Oral, BID      Continuous Infusions:lactated ringers, 125 mL/hr      PRN Meds:.  acetaminophen    senna-docusate sodium **AND** polyethylene glycol **AND** bisacodyl **AND** bisacodyl    famotidine    HYDROcodone-acetaminophen    Morphine    ondansetron    simethicone    [COMPLETED] Insert Peripheral IV **AND** sodium chloride    Assessment & Plan   Assessment & Plan     Active Hospital Problems    Diagnosis  POA    **Biliary obstruction [K83.1]  Yes    Calculus of gallbladder and bile duct with obstruction [K80.71]  Yes    Mood disorder [F39]  Yes      Resolved Hospital Problems   No resolved problems to display.        Brief Hospital Course to date:  Eder Wills is a 23 y.o. female with no PMH, who was recently diagnosed with cholelithiasis after 6 weeks of abdominal/ right sided back pain and was scheduled for surgical evaluation. However, patient presented to [facility] ED 11/7 for worsening abdominal pain associated with N/V. MRCP notes choledocholithiasis. GI and Gen surgery consulted.     This patient's problems and plans were partially entered by my partner and updated as appropriate by me 11/10/24.     Choledocholithiasis  Cholecystitis  -- MRCP/ GB US reviewed  -- LFTs, T Bili trending down  -- ERCP 11/9 with Dr. Patrick revealed dilated main bile duct, no stones or filling defects  -- lap CCY 11/10 with Dr. Raza  -- pain and nausea control  -- continue Rocephin and change Flagyl to IV today, discussed with pharmacy (IV Flagyl shortage and pt now NPO)  --start Zosyn x 2 days starting 11/11 to complete 5 days abx  -- bowel regimen    Post ERCP Pancreatitis  -- elevated lipase 2,820 this morning, s/p ERCP    -- WBCs to 18.12 with left shift, , BP stable  -- Procal negative, elevated CRP 1.82  -- IV abx as above  -- IV LR @ 150 ml/hr  -- Strict NPO   -- AM lipase, CMP, CBC w/ diff, trend CRP      Expected Discharge Location and Transportation: Home  Expected Discharge   Expected Discharge Date: 2024; Expected Discharge Time:      VTE Prophylaxis:  Mechanical VTE prophylaxis orders are present.         AM-PAC 6 Clicks Score (PT): 24 (24)    CODE STATUS:   Code Status and Medical Interventions: CPR (Attempt to Resuscitate); Full Support   Ordered at: 24 0236     Level Of Support Discussed With:    Patient     Code Status (Patient has no pulse and is not breathing):    CPR (Attempt to Resuscitate)     Medical Interventions (Patient has pulse or is breathing):    Full Support       MARITZA Wallis  11/10/24        Electronically signed by Latasha Nance APRN at 11/10/24 1308       Marianne Calderon APRN at 24 1153                [facility] Medicine Services  PROGRESS NOTE    Patient Name: Eder Wills  : 2000  MRN: [id number]    Date of Admission: 2024  Primary Care Physician: Eva Tovar MD    Subjective   Subjective     CC:  Abdominal Pain/ vomiting    HPI:  Sleepy after ERCP, denies pain, nausea      Objective   Objective     Vital Signs:   Temp:  [96.6 °F (35.9 °C)-97.7 °F (36.5 °C)] 97 °F (36.1 °C)  Heart Rate:  [59-88] 61  Resp:  [14-20] 16  BP: ()/(59-98) 94/64  Flow (L/min) (Oxygen Therapy):  [3] 3     Physical Exam:  Constitutional: No acute distress, sleeping, awakes to voice  HENT: NCAT, mucous membranes moist  Respiratory: Clear to auscultation bilaterally, respiratory effort normal   Cardiovascular: RRR, no murmurs, rubs, or gallops  Gastrointestinal: Positive bowel sounds, soft, mild tenderness RUQ, nondistended  Musculoskeletal: No bilateral ankle edema  Psychiatric: Appropriate affect,  cooperative  Neurologic: Oriented x 3, strength symmetric in all extremities, Cranial Nerves grossly intact to confrontation, speech clear  Skin: No rashes      Results Reviewed:  LAB RESULTS:      Lab 11/08/24  0527 11/07/24  2240   WBC 8.62 9.78   HEMOGLOBIN 12.3 12.4   HEMATOCRIT 37.6 38.0   PLATELETS 315 337   NEUTROS ABS 5.43 7.46*   IMMATURE GRANS (ABS) 0.02 0.03   LYMPHS ABS 2.20 1.46   MONOS ABS 0.77 0.65   EOS ABS 0.16 0.14   MCV 92.4 92.5   PROCALCITONIN  --  0.12   LACTATE  --  1.1         Lab 11/09/24  0951 11/08/24  0527 11/07/24  2240   SODIUM 139 147* 143   POTASSIUM 4.1 4.4 4.1   CHLORIDE 106 112* 108*   CO2 21.0* 21.0* 21.0*   ANION GAP 12.0 14.0 14.0   BUN 10 9 9   CREATININE 0.68 0.77 0.83   EGFR 127.3 112.7 103.0   GLUCOSE 102* 91 117*   CALCIUM 9.1 9.1 9.1         Lab 11/09/24  0951 11/08/24  0527 11/07/24  2240   TOTAL PROTEIN 6.4 6.8 7.0   ALBUMIN 4.0 3.9 4.2   GLOBULIN 2.4 2.9 2.8   ALT (SGPT) 432* 467* 519*   AST (SGOT) 155* 142* 177*   BILIRUBIN 2.4* 2.9* 3.2*   ALK PHOS 258* 312* 325*   LIPASE  --   --  60                     Brief Urine Lab Results  (Last result in the past 365 days)        Color   Clarity   Blood   Leuk Est   Nitrite   Protein   CREAT   Urine HCG        11/08/24 0113 Yellow   Clear   Negative   Negative   Negative   Negative                   Microbiology Results Abnormal       None            MRI abdomen wo contrast mrcp    Result Date: 11/8/2024  MRI ABDOMEN WO CONTRAST MRCP Date of Exam: 11/8/2024 4:57 AM EST Indication: ? obstructing stone.  RUQ pain, cholelithiasis on CT A/P.  Comparison: Ultrasound right upper quadrant November 8, 2024; CT abdomen pelvis November 8, 2024 Technique: Standard noncontrast MR pulse sequences of the abdomen were obtained. High-resolution T2 MRCP images were acquired and 3-D MIP reconstructions were created for interpretation. Findings: Lung bases appear grossly clear.  Visualized mediastinal structures are unremarkable.  Superficial fat  and underlying musculature appear within normal limits.  Bone marrow signal appears grossly unremarkable. The liver appears normal in size and signal.  Liver vasculature appears conventional and patent.  There is no intrahepatic biliary dilatation. The gallbladder is nondistended. There are innumerable stones throughout the gallbladder. There is gallbladder wall thickening and pericholecystic fluid suggestive of acute cholecystitis. Common bile duct measures 8 mm diameter. There are a few small stones in the distal common bile duct The pancreas appears normal signal without solid or cystic mass or adjacent inflammation. The pancreatic duct is nondistended.  There is no evidence of pancreatic divisum. The spleen is normal size and signal.  There is no adrenal mass.  No solid or cystic kidney mass or hydronephrosis.  There is no focal intraperitoneal inflammation or fluid collection.  No evidence of ascites. No lymphadenopathy.     Impression: Impression: 1.Cholelithiasis with gallbladder wall thickening and pericholecystic fluid suggestive of acute cholecystitis. 2.Choledocholithiasis with a few small stones in the distal common bile duct. Electronically Signed: Dmitriy Spaulding MD  11/8/2024 5:56 AM EST  Workstation ID: [id number]    US Gallbladder    Result Date: 11/8/2024  US GALLBLADDER Date of Exam: 11/8/2024 1:33 AM EST Indication: RUQ pain. Comparison: CT abdomen pelvis dated November 8, 2024 Technique: Grayscale and color Doppler ultrasound evaluation of the right upper quadrant was performed. Findings: The liver is homogeneous. There are no focal liver lesions. Portal venous and hepatic venous flows are normal. There are multiple stones in the gallbladder. There is marked biliary ductal dilatation up to 1.5 cm. Obstructing gallstone would be in the differential. Correlation with MRCP or ERCP may be required. The visualized pancreatic tissue is normal. The right kidney is 9.3 cm in slgb-tc-jsva length and there is  no hydronephrosis.     Impression: Impression: 1.Cholelithiasis. 2.Marked biliary ductal dilatation. Correlation with MRCP or ERCP may be required. Electronically Signed: Dmitriy Spaulding MD  11/8/2024 2:28 AM EST  Workstation ID: [id number]    CT Abdomen Pelvis With Contrast    Result Date: 11/8/2024  CT ABDOMEN PELVIS W CONTRAST Date of Exam: 11/8/2024 12:38 AM EST Indication: RUQ pain, jaundice. Comparison: None available. Technique: Axial CT images were obtained of the abdomen and pelvis following the uneventful intravenous administration of 85 mL Isovue-300. Reconstructed coronal and sagittal images were also obtained. Automated exposure control and iterative construction methods were used. Findings: Lung Bases:   The visualized lung bases and lower mediastinal structures are unremarkable. Liver: Liver is normal in size and CT density. No focal lesions. Biliary/Gallbladder:  There are multiple stones in the gallbladder. There is mild stranding around the gallbladder wall. There is mild intra and proximal extrahepatic biliary ductal dilatation. Spleen: Spleen is normal in size and CT density. Pancreas:  Pancreas is normal. There is no evidence of pancreatic mass or peripancreatic fluid. Kidneys:  Kidneys are normal in size. There are no stones or hydronephrosis. Adrenals:  Adrenal glands are unremarkable. Retroperitoneal/Lymph Nodes/Vasculature:  No retroperitoneal adenopathy is identified. Gastrointestinal/Mesentery:  The bowel loops are non-dilated without wall thickening or mass. The appendix appears within normal limits. No evidence of obstruction. No free air. No mesenteric fluid collections identified. Bladder:  The bladder is normal. Genital:   Unremarkable       Bony Structures:   Visualized bony structures are consistent with the patient's age.     Impression: Impression: Cholelithiasis with mild stranding around the gallbladder wall and mild intra and proximal extrahepatic biliary ductal dilatation.  Electronically Signed: Dmitriy Spaulding MD  11/8/2024 1:18 AM EST  Workstation ID: [id number]         Current medications:  Scheduled Meds:cefTRIAXone, 2,000 mg, Intravenous, Q24H  FLUoxetine, 40 mg, Oral, Daily  metroNIDAZOLE, 500 mg, Oral, Q8H  senna-docusate sodium, 2 tablet, Oral, BID      Continuous Infusions:sodium chloride, 75 mL/hr, Last Rate: 75 mL/hr (11/09/24 0532)      PRN Meds:.  acetaminophen    senna-docusate sodium **AND** polyethylene glycol **AND** bisacodyl **AND** bisacodyl    HYDROcodone-acetaminophen    Morphine    ondansetron    [COMPLETED] Insert Peripheral IV **AND** sodium chloride    Assessment & Plan   Assessment & Plan     Active Hospital Problems    Diagnosis  POA    **Biliary obstruction [K83.1]  Yes    Calculus of gallbladder and bile duct with obstruction [K80.71]  Yes    Mood disorder [F39]  Yes      Resolved Hospital Problems   No resolved problems to display.        Brief Hospital Course to date:  dEer Wills is a 23 y.o. female with no pmh with 6 weeks of abdominal/ right sided back pain, diagnosed with cholelithiasis and scheduled for surgical evaluation. Presents 11/7 with worsening abdominal pain associated with n/v. MRCP notes choledocholithiasis. GI and Gen surgery consulted.      Choledocholithiasis  Cholecystitis  -- MRCP/ GB US reviewed  -- LFTs, T Bili trending down  -- ERCP today with Dr. Patrick reveal dilated main bile duct, no stones or filling defects  -- plan lap CCY 11/10 with Dr. Raza  -- clear liquid diet today, NPO after MN  -- pain and nausea control  -- continue rocephin, flagyl  -- bowel regimen  -- s/p IV      Expected Discharge Location and Transportation: home  Expected Discharge   Expected Discharge Date: 11/11/2024; Expected Discharge Time:      VTE Prophylaxis:  Mechanical VTE prophylaxis orders are present.         AM-PAC 6 Clicks Score (PT): 24 (11/09/24 1000)    CODE STATUS:   Code Status and Medical Interventions: CPR (Attempt to Resuscitate); Full  "Support   Ordered at: 11/08/24 0236     Level Of Support Discussed With:    Patient     Code Status (Patient has no pulse and is not breathing):    CPR (Attempt to Resuscitate)     Medical Interventions (Patient has pulse or is breathing):    Full Support       MARITZA Isaac  11/09/24  Electronically signed by MARITZA Isaac, 11/09/24, 11:54 AM EST.            Electronically signed by Marianne Calderon APRN at 11/09/24 1203       Raheem Raza MD at 11/09/24 0745          Patient Name:  Eder Wills  YOB: 2000  [id number]    Surgery Progress Note    Date of visit: 11/9/2024      Subjective: No significant changes.  Still with some pain although not significantly improved or worsened.  No fevers or chills          Objective:     /98 (BP Location: Left arm, Patient Position: Lying)   Pulse 59   Temp 97.4 °F (36.3 °C) (Temporal)   Resp 16   Ht 157.5 cm (62\")   Wt 84.4 kg (186 lb)   LMP 10/29/2024 (Approximate)   SpO2 98%   BMI 34.02 kg/m²     Intake/Output Summary (Last 24 hours) at 11/9/2024 0746  Last data filed at 11/9/2024 0532  Gross per 24 hour   Intake 1569 ml   Output 1650 ml   Net -81 ml       GEN:   Awake, alert, in no acute distress, resting comfortably in bed   CV:   Regular rate and rhythm  L:  Symmetric expansion, not labored on room air  Abd:  Soft, not distended, some mild diffuse tenderness palpation throughout the upper abdomen  Ext:  No cyanosis, clubbing, or edema    Recent labs that are back at this time have been reviewed.           Assessment/ Plan:    Mrs. Wills is a 23-year-old lady without significant past medical history with choledocholithiasis     # Choledocholithiasis  -GI plans for ERCP today  -Will follow-up results of this.  Possible cholecystectomy tomorrow.  Keep n.p.o. after midnight      Raheem Raza MD  11/9/2024  07:46 EST        Electronically signed by Raheem Raza MD at 11/09/24 0747       Kel Barron " DO Med at 11/08/24 0821                [facility] Medicine Services  ADMISSION FOLLOW-UP NOTE          Patient admitted after midnight, H&P by my partner performed earlier on today's date reviewed.  Interim findings, labs, and charting also reviewed.        [facility] Problem List has been managed and updated to include any new diagnoses:  Active Hospital Problems    Diagnosis  POA    **Biliary obstruction [K83.1]  Yes    Calculus of gallbladder and bile duct with obstruction [K80.71]  Yes    Mood disorder [F39]  Yes      Resolved Hospital Problems   No resolved problems to display.         ADDITIONAL PLAN:  - detailed assessment and plan from admission reviewed  - Patient admitted this AM by MARITZA Lundberg, chart reviewed  - Summary: This is a 22 y/o healthy female who developed RT sided back pain, was Dx w/ cholelithiasis and scheduled for CCY, then developed worse pain and conjunctival icterus; labs and imaging on arrival c/w choledocholithiasis    Assessment/plan    Cholelithiasis with choledocholithiasis  Elevated ALP/AST/ALT/bilirubin  -GI consult pending  -General Surgery will need to be involved, consult placed  -d/w pharmacy, change Zosyn to CTX/Flagyl (due to rut'l IVF shortage and slight reduction in cdiff risk)  -Pain control    Mood disorder  -Prozac      Kel Barron DO  11/08/24        Electronically signed by Kel Barron DO at 11/08/24 1409          Consult Notes (last 4 days)        Raheem Raza MD at 11/08/24 0937        Consult Orders    1. Inpatient General Surgery Consult [[id number]] ordered by Kel Barron DO at 11/08/24 0723                 General Surgery Consultation Note    Date of Service: 11/8/2024  Eder Wills  [id number]  2000      Referring Provider: Kel Barron DO    Location of Consult: Inpatient     Reason for Consultation: Choledocholithiasis       History of Present Illness:  I am seeing,  Eder Wills, in consultation at request of Kel Barron DO regarding choledocholithiasis.  She is a 23-year-old lady without significant past medical history who presents to [facility] with complaints of 2 weeks of epigastric and right upper quadrant pain.  This has been ongoing for the past 6 weeks or so but has been worse over the last 2 weeks.  Starting on Wednesday night the pain has been particularly worse.  She describes this as a pain on the right side of her abdomen that radiates to the back.  This is associated with nausea and vomiting.  She thinks that the symptoms have been generally associated with eating over the last few weeks.  She has not had any fevers.  She was previously seen in an outside ER and there was some concern for gallbladder disease and she was apparently set for outpatient surgical for follow-up.  She has no history of prior abdominal surgeries..      Problems Addressed this Visit          Gastrointestinal Abdominal     Calculus of gallbladder and bile duct with obstruction - Primary     Other Visit Diagnoses       Jaundice              Diagnoses         Codes Comments    Calculus of gallbladder and bile duct with obstruction without cholecystitis    -  Primary ICD-10-CM: K80.71  ICD-9-CM: 574.91     Jaundice     ICD-10-CM: R17  ICD-9-CM: 782.4             PMHx:  History reviewed. No pertinent past medical history.    Past Surgical History:  Past Surgical History:    ADENOIDECTOMY    COSMETIC SURGERY    2021    TONSILLECTOMY       Allergies:  No Known Allergies    Medications:  No current facility-administered medications on file prior to encounter.     Current Outpatient Medications on File Prior to Encounter   Medication Sig Dispense Refill    amphetamine-dextroamphetamine XR (ADDERALL XR) 30 MG 24 hr capsule Take 1 capsule by mouth Daily      FLUoxetine (PROzac) 40 MG capsule Take 1 capsule by mouth Daily.           Current Facility-Administered  Medications:     acetaminophen (TYLENOL) tablet 650 mg, 650 mg, Oral, Q6H PRN, Kel Barron DO    sennosides-docusate (PERICOLACE) 8.6-50 MG per tablet 2 tablet, 2 tablet, Oral, BID, 2 tablet at 11/08/24 0915 **AND** polyethylene glycol (MIRALAX) packet 17 g, 17 g, Oral, Daily PRN **AND** bisacodyl (DULCOLAX) EC tablet 5 mg, 5 mg, Oral, Daily PRN **AND** bisacodyl (DULCOLAX) suppository 10 mg, 10 mg, Rectal, Daily PRN, Rosalinda, Elli, APRN    cefTRIAXone (ROCEPHIN) 2,000 mg in sodium chloride 0.9 % 100 mL MBP, 2,000 mg, Intravenous, Q24H, Kel Barron DO, Last Rate: 200 mL/hr at 11/08/24 0915, 2,000 mg at 11/08/24 0915    FLUoxetine (PROzac) capsule 40 mg, 40 mg, Oral, Daily, Rosalinda, Elli, APRN, 40 mg at 11/08/24 0916    HYDROcodone-acetaminophen (NORCO) 5-325 MG per tablet 1 tablet, 1 tablet, Oral, Q6H PRN, Kel Barron DO    metroNIDAZOLE (FLAGYL) tablet 500 mg, 500 mg, Oral, Q8H, Kel Barron DO, 500 mg at 11/08/24 0916    morphine injection 2 mg, 2 mg, Intravenous, Q3H PRN, Rosalinda, Elli, APRN    ondansetron (ZOFRAN) injection 4 mg, 4 mg, Intravenous, Q6H PRN, Rosalinda, Elli, APRN    [COMPLETED] Insert Peripheral IV, , , Once **AND** sodium chloride 0.9 % flush 10 mL, 10 mL, Intravenous, PRN, Remi Campos MD    sodium chloride 0.9 % infusion, 75 mL/hr, Intravenous, Continuous, Kel Barron DO, Last Rate: 75 mL/hr at 11/08/24 0341, 75 mL/hr at 11/08/24 0341      Family History:  Family History   Problem Relation Age of Onset    No Known Problems Mother     No Known Problems Father        Social History: Pt lives in [place], [place].    Tobacco use: Denies     EtOH use : Occasional   Illicit drug use: Denies       Review of Systems:   Constitutional: No fevers, chills or malaise   Eyes: Denies visual changes    Cardiovascular: Denies chest pain, palpitations   Pulmonary: Denies cough or shortness of breath   Abdominal/ GI: See HPI    Genitourinary:  "Denies dysuria or hematuria   Musculoskeletal: Denies any but chronic joint aches, pains or deformities   Psychiatric: No recent mood changes   Neurologic: No paresthesias or loss of function    BP 94/60 (BP Location: Left arm, Patient Position: Lying)   Pulse 73   Temp 97.9 °F (36.6 °C) (Oral)   Resp 18   Ht 157.5 cm (62\")   Wt 84.4 kg (186 lb)   LMP 10/29/2024 (Approximate)   SpO2 98%   BMI 34.02 kg/m²   Body mass index is 34.02 kg/m².    Gen: Awake, alert, no acute distress, resting in bed, she appears jaundice  Head: Normocephalic, atraumatic.   Eyes: Pupils equal, round, react to light and accommodation.  Scleral icterus present  Mouth: Oral mucosa without lesions,   Neck: No masses, lymphadenopathy or carotid bruits bilaterally   CV: Rhythm and rate regular, no murmurs, rubs or gallops  Lungs: Clear to auscultation bilaterally, not labored on room air   Abdomen: Soft, no obvious scars, there is some mild diffuse tenderness to palpation throughout the upper abdomen  Groin : No obvious hernias bilaterally   Extremities:  No cyanosis, clubbing or edema bilaterally  Lymphatics: No abnormal lymphadenopathy appreciated   Neurologic: No gross deficits         CBC  Results from last 7 days   Lab Units 11/08/24  0527   WBC 10*3/mm3 8.62   HEMOGLOBIN g/dL 12.3   HEMATOCRIT % 37.6   PLATELETS 10*3/mm3 315       CMP  Results from last 7 days   Lab Units 11/08/24  0527   SODIUM mmol/L 147*   POTASSIUM mmol/L 4.4   CHLORIDE mmol/L 112*   CO2 mmol/L 21.0*   BUN mg/dL 9   CREATININE mg/dL 0.77   CALCIUM mg/dL 9.1   BILIRUBIN mg/dL 2.9*   ALK PHOS U/L 312*   ALT (SGPT) U/L 467*   AST (SGOT) U/L 142*   GLUCOSE mg/dL 91       Radiology  Imaging Results (Last 72 Hours)       Procedure Component Value Units Date/Time    MRI abdomen wo contrast mrcp [[id number]] Collected: 11/08/24 0546     Updated: 11/08/24 0559    Narrative:      MRI ABDOMEN WO CONTRAST MRCP    Date of Exam: 11/8/2024 4:57 AM EST    Indication: ? " obstructing stone.  RUQ pain, cholelithiasis on CT A/P.     Comparison: Ultrasound right upper quadrant November 8, 2024; CT abdomen pelvis November 8, 2024    Technique: Standard noncontrast MR pulse sequences of the abdomen were obtained. High-resolution T2 MRCP images were acquired and 3-D MIP reconstructions were created for interpretation.    Findings:  Lung bases appear grossly clear.  Visualized mediastinal structures are unremarkable.  Superficial fat and underlying musculature appear within normal limits.  Bone marrow signal appears grossly unremarkable.    The liver appears normal in size and signal.  Liver vasculature appears conventional and patent.  There is no intrahepatic biliary dilatation.    The gallbladder is nondistended. There are innumerable stones throughout the gallbladder. There is gallbladder wall thickening and pericholecystic fluid suggestive of acute cholecystitis. Common bile duct measures 8 mm diameter. There are a few small   stones in the distal common bile duct    The pancreas appears normal signal without solid or cystic mass or adjacent inflammation. The pancreatic duct is nondistended.  There is no evidence of pancreatic divisum.    The spleen is normal size and signal.  There is no adrenal mass.  No solid or cystic kidney mass or hydronephrosis.  There is no focal intraperitoneal inflammation or fluid collection.  No evidence of ascites. No lymphadenopathy.      Impression:      Impression:  1.Cholelithiasis with gallbladder wall thickening and pericholecystic fluid suggestive of acute cholecystitis.  2.Choledocholithiasis with a few small stones in the distal common bile duct.        Electronically Signed: Dmitriy Spaulding MD    11/8/2024 5:56 AM EST    Workstation ID: [id number]    US Gallbladder [[id number]] Collected: 11/08/24 0225     Updated: 11/08/24 0231    Narrative:      US GALLBLADDER    Date of Exam: 11/8/2024 1:33 AM EST    Indication: RUQ pain.    Comparison: CT abdomen  pelvis dated November 8, 2024    Technique: Grayscale and color Doppler ultrasound evaluation of the right upper quadrant was performed.      Findings:  The liver is homogeneous. There are no focal liver lesions. Portal venous and hepatic venous flows are normal.    There are multiple stones in the gallbladder. There is marked biliary ductal dilatation up to 1.5 cm. Obstructing gallstone would be in the differential. Correlation with MRCP or ERCP may be required.    The visualized pancreatic tissue is normal. The right kidney is 9.3 cm in rrer-kf-bxca length and there is no hydronephrosis.      Impression:      Impression:  1.Cholelithiasis.  2.Marked biliary ductal dilatation. Correlation with MRCP or ERCP may be required.        Electronically Signed: Dmitriy Spaulding MD    11/8/2024 2:28 AM EST    Workstation ID: [id number]    CT Abdomen Pelvis With Contrast [[id number]] Collected: 11/08/24 0115     Updated: 11/08/24 0121    Narrative:      CT ABDOMEN PELVIS W CONTRAST    Date of Exam: 11/8/2024 12:38 AM EST    Indication: RUQ pain, jaundice.    Comparison: None available.    Technique: Axial CT images were obtained of the abdomen and pelvis following the uneventful intravenous administration of 85 mL Isovue-300. Reconstructed coronal and sagittal images were also obtained. Automated exposure control and iterative   construction methods were used.      Findings:  Lung Bases:     The visualized lung bases and lower mediastinal structures are unremarkable.    Liver:  Liver is normal in size and CT density. No focal lesions.    Biliary/Gallbladder:    There are multiple stones in the gallbladder. There is mild stranding around the gallbladder wall. There is mild intra and proximal extrahepatic biliary ductal dilatation.    Spleen:  Spleen is normal in size and CT density.    Pancreas:    Pancreas is normal. There is no evidence of pancreatic mass or peripancreatic fluid.    Kidneys:    Kidneys are normal in size. There are  no stones or hydronephrosis.    Adrenals:    Adrenal glands are unremarkable.    Retroperitoneal/Lymph Nodes/Vasculature:    No retroperitoneal adenopathy is identified.    Gastrointestinal/Mesentery:    The bowel loops are non-dilated without wall thickening or mass. The appendix appears within normal limits. No evidence of obstruction. No free air. No mesenteric fluid collections identified.    Bladder:    The bladder is normal.    Genital:     Unremarkable          Bony Structures:     Visualized bony structures are consistent with the patient's age.        Impression:      Impression:  Cholelithiasis with mild stranding around the gallbladder wall and mild intra and proximal extrahepatic biliary ductal dilatation.        Electronically Signed: Dmitriy Spaulding MD    11/8/2024 1:18 AM EST    Workstation ID: [id number]                Results Review: I have personally reviewed all of the recent lab and imaging results available at this time.     Vital signs are stable    White count is 8.  Lipase not elevated.  Liver function tests are elevated with an AST of 142, ALT of 467, total bilirubin of 2.9    I have reviewed a right upper quadrant ultrasound which demonstrates findings of cholelithiasis and biliary dilation    I have reviewed a CT scan of the abdomen and pelvis which demonstrates cholelithiasis and a distended gallbladder with some mild biliary dilation    I have reviewed an MRI which demonstrates findings of choledocholithiasis    Assessment:  Mrs. Wills is a 23-year-old lady without significant past medical history with choledocholithiasis.  Her clinical history and workup are consistent with choledocholithiasis.  I agree with gastroenterology consult for ERCP.  Likely cholecystectomy this admission following ERCP    Plan:  -Agree GI consult for ERCP  -IV antibiotics  -Likely cholecystectomy this admission pending ERCP eval      I discussed the patient's findings and my recommendations with the patient and/or  family, as well as the primary team     Raheem Raza MD  11/08/24  09:37 EST      Part of this note may be an electronic transcription/translation of spoken language to printed text using the Dragon Dictation System.                 Electronically signed by Raheem Raza MD at 11/08/24 0940       Deanna Jean PA at 11/08/24 0901        Consult Orders    1. Inpatient Gastroenterology Consult [[id number]] ordered by Elli Tellez APRN at 11/08/24 0236              Attestation signed by Dick Patrick MD at 11/08/24 1913    I have reviewed this documentation and agree.    Due to lack of adequate anesthesia time, patient's case is being postponed until tomorrow morning.  This was discussed in full with patient and her mother in the preprocedural endoscopy suite.  Continue antibiotics.  Low fiber diet then NPO after midnight.  Plan for ERCP first case in AM.                  [facility] [place] [facility] Gastroenterology Consult    Referring Provider: eKl Barron DO     PCP: Eva Tovar MD    Reason for Consultation: Choledocholithiasis     Chief complaint: Abdominal pain     History of present illness:    Eder Wills is a 23 y.o. female who is admitted with choledocholithiasis with biliary obstruction and cholecystitis.   She describes episodes of biliary colic for the last three months.  She was diagnosed with cholelithiasis and scheduled to have an outpatient cholecystectomy in the coming weeks.   She developed worsening severe pain yesterday that radiated to her back.   She has associated nausea.       Labs show elevated LFTs with alk phos of 325, ,  and total bilirubin 3.2.    MRCP showed several small stones in the distal common bile duct as well as cholelithiasis with wall thickening and pericholecystic fluid suggestive of cholecystitis.       Allergies:  Patient has no known allergies.    Scheduled Meds:  cefTRIAXone, 2,000 mg, Intravenous, Q24H  FLUoxetine, 40 mg, Oral,  "Daily  metroNIDAZOLE, 500 mg, Oral, Q8H  senna-docusate sodium, 2 tablet, Oral, BID         Infusions:  sodium chloride, 75 mL/hr, Last Rate: 75 mL/hr (11/08/24 0341)        PRN Meds:    acetaminophen    senna-docusate sodium **AND** polyethylene glycol **AND** bisacodyl **AND** bisacodyl    HYDROcodone-acetaminophen    Morphine    ondansetron    [COMPLETED] Insert Peripheral IV **AND** sodium chloride    Home Meds:  Medications Prior to Admission   Medication Sig Dispense Refill Last Dose/Taking    amphetamine-dextroamphetamine XR (ADDERALL XR) 30 MG 24 hr capsule Take 1 capsule by mouth Daily   11/8/2024 Morning    FLUoxetine (PROzac) 40 MG capsule Take 1 capsule by mouth Daily.   11/8/2024 Morning       ROS: Review of Systems   Constitutional:  Negative for chills and fever.   Respiratory: Negative.     Cardiovascular: Negative.    Gastrointestinal:  Positive for abdominal pain and nausea.   Genitourinary: Negative.    Musculoskeletal:  Positive for back pain.   Skin: Negative.    Neurological: Negative.    Hematological: Negative.    Psychiatric/Behavioral: Negative.         PAST MED HX:  History reviewed. No pertinent past medical history.    PAST SURG HX:  Past Surgical History:   Procedure Laterality Date    ADENOIDECTOMY  2008    COSMETIC SURGERY      2021    TONSILLECTOMY  2008     FAM HX:  Family History   Problem Relation Age of Onset    No Known Problems Mother     No Known Problems Father      SOC HX:  Social History     Socioeconomic History    Marital status: Single   Tobacco Use    Smoking status: Never    Smokeless tobacco: Never   Vaping Use    Vaping status: Never Used   Substance and Sexual Activity    Alcohol use: Yes     Comment: socially    Drug use: Never    Sexual activity: Defer     PHYSICAL EXAM  BP 94/60 (BP Location: Left arm, Patient Position: Lying)   Pulse 73   Temp 97.9 °F (36.6 °C) (Oral)   Resp 18   Ht 157.5 cm (62\")   Wt 84.4 kg (186 lb)   LMP 10/29/2024 (Approximate)   " "SpO2 98%   BMI 34.02 kg/m²   Wt Readings from Last 3 Encounters:   11/07/24 84.4 kg (186 lb)   07/17/17 79.6 kg (175 lb 8 oz) (97%, Z= 1.85)*   09/01/16 71.4 kg (157 lb 8 oz) (95%, Z= 1.65)*     * Growth percentiles are based on CDC (Girls, 2-20 Years) data.   ,body mass index is 34.02 kg/m².  Physical Exam  Constitutional:       General: She is not in acute distress.  HENT:      Head: Normocephalic and atraumatic.      Mouth/Throat:      Mouth: Mucous membranes are moist.   Cardiovascular:      Rate and Rhythm: Normal rate and regular rhythm.   Pulmonary:      Effort: Pulmonary effort is normal. No respiratory distress.   Abdominal:      General: Bowel sounds are normal.      Palpations: Abdomen is soft.      Tenderness: There is abdominal tenderness. There is no guarding.   Skin:     General: Skin is warm and dry.   Neurological:      Mental Status: She is alert and oriented to person, place, and time.   Psychiatric:         Behavior: Behavior normal.       Results Review:   I reviewed the patient's new clinical results.    Lab Results   Component Value Date    WBC 8.62 11/08/2024    HGB 12.3 11/08/2024    HGB 12.4 11/07/2024    HGB 14.5 08/18/2024    HCT 37.6 11/08/2024    MCV 92.4 11/08/2024     11/08/2024     No results found for: \"INR\"    Lab Results   Component Value Date    GLUCOSE 91 11/08/2024    BUN 9 11/08/2024    CREATININE 0.77 11/08/2024    BCR 11.7 11/08/2024     (H) 11/08/2024    K 4.4 11/08/2024    CO2 21.0 (L) 11/08/2024    CALCIUM 9.1 11/08/2024    ALBUMIN 3.9 11/08/2024    ALKPHOS 312 (H) 11/08/2024    BILITOT 2.9 (H) 11/08/2024     (H) 11/08/2024     (H) 11/08/2024     CT Abdomen/pelvis (as interpreted by radiologist)  Findings:  Lung Bases:     The visualized lung bases and lower mediastinal structures are unremarkable.   Liver:  Liver is normal in size and CT density. No focal lesions.   Biliary/Gallbladder:    There are multiple stones in the gallbladder. There is " mild stranding around the gallbladder wall. There is mild intra and proximal extrahepatic biliary ductal dilatation.   Spleen:  Spleen is normal in size and CT density.   Pancreas:    Pancreas is normal. There is no evidence of pancreatic mass or peripancreatic fluid.   Kidneys:    Kidneys are normal in size. There are no stones or hydronephrosis.   Adrenals:    Adrenal glands are unremarkable.   Retroperitoneal/Lymph Nodes/Vasculature:    No retroperitoneal adenopathy is identified.   Gastrointestinal/Mesentery:    The bowel loops are non-dilated without wall thickening or mass. The appendix appears within normal limits. No evidence of obstruction. No free air. No mesenteric fluid collections identified.   Bladder:    The bladder is normal.   Genital:     Unremarkable        Bony Structures:     Visualized bony structures are consistent with the patient's age.   IMPRESSION:  Impression:  Cholelithiasis with mild stranding around the gallbladder wall and mild intra and proximal extrahepatic biliary ductal dilatation.    MRCP:  Findings:  Lung bases appear grossly clear.  Visualized mediastinal structures are unremarkable.  Superficial fat and underlying musculature appear within normal limits.  Bone marrow signal appears grossly unremarkable.   The liver appears normal in size and signal.  Liver vasculature appears conventional and patent.  There is no intrahepatic biliary dilatation.   The gallbladder is nondistended. There are innumerable stones throughout the gallbladder. There is gallbladder wall thickening and pericholecystic fluid suggestive of acute cholecystitis. Common bile duct measures 8 mm diameter. There are a few small stones in the distal common bile duct   The pancreas appears normal signal without solid or cystic mass or adjacent inflammation. The pancreatic duct is nondistended.  There is no evidence of pancreatic divisum.   The spleen is normal size and signal.  There is no adrenal mass.  No solid  or cystic kidney mass or hydronephrosis.  There is no focal intraperitoneal inflammation or fluid collection.  No evidence of ascites. No lymphadenopathy.   IMPRESSION:  Impression:  1.Cholelithiasis with gallbladder wall thickening and pericholecystic fluid suggestive of acute cholecystitis.  2.Choledocholithiasis with a few small stones in the distal common bile duct.    ASSESSMENTS/PLANS    Choledocholithiasis with biliary obstruction   Acute cholecystitis   Elevated LFTs, related to above.       >> Recommend ERCP today.   Discussed risks of procedure related pancreatitis, bleeding, infection, perforation and failed cannulation.   Patient elects to proceed.  >> NPO   >> General surgery consultation regarding cholecystectomy  >> IV Ceftriaxone and IV Metronidazole     I discussed the patient's findings and my recommendations with patient and her father whom is present at the bedside.       ROXY Taylor  11/08/24  09:01 EST      Electronically signed by Dick Patrick MD at 11/08/24 4748

## 2024-11-11 NOTE — CASE MANAGEMENT/SOCIAL WORK
Continued Stay Note  [facility]     Patient Name: Eder Wills  MRN: [id number]  Today's Date: 11/11/2024    Admit Date: 11/7/2024    Plan: Home with family transporting.   Discharge Plan       Row Name 11/11/24 1256       Plan    Plan Home with family transporting.    Plan Comments SW'er met with patient at bedside. No discharge needs at this time. Surgery and GI following. Plan is home with family transporting.    Final Discharge Disposition Code 01 - home or self-care                   Discharge Codes    No documentation.                 Expected Discharge Date and Time       Expected Discharge Date Expected Discharge Time    Nov 12, 2024               ABDIRASHID Cobos (Kay)

## 2024-11-11 NOTE — PROGRESS NOTES
"Patient Name:  Eder Wills  YOB: 2000  [id number]    Surgery Progress Note    Date of visit: 11/11/2024      Subjective: Tachycardic overnight.  Overall she tells me that she feels better than yesterday.  She reports slightly less pain.  Still reports diffuse abdominal discomfort and some bloating.  No nausea or vomiting.  Asking for clear liquids.          Objective:     /89 (BP Location: Left arm, Patient Position: Lying)   Pulse (!) 121   Temp 98.7 °F (37.1 °C) (Oral)   Resp 18   Ht 157.5 cm (62\")   Wt 84.4 kg (186 lb)   LMP 10/29/2024 (Approximate)   SpO2 95%   BMI 34.02 kg/m²     Intake/Output Summary (Last 24 hours) at 11/11/2024 0659  Last data filed at 11/11/2024 0431  Gross per 24 hour   Intake 2000 ml   Output 1760 ml   Net 240 ml       GEN:   Awake, alert, in no acute distress, resting comfortably in bed   CV:   Regular rate and rhythm  L:  Symmetric expansion, not labored on room air  Abd:  Soft, mildly distended throughout, incisions are clean dry and intact, there is some tenderness to palpation diffusely throughout the abdomen without rebound or guarding and some appropriate incisional site tenderness, BRUNO drain in the right upper quadrant is serosanguineous and not bilious  Ext:  No cyanosis, clubbing, or edema    Recent labs that are back at this time have been reviewed.           Assessment/ Plan:    Mrs. Wills is a 23-year-old lady without significant past medical history with choledocholithiasis      # Choledocholithiasis  # Pancreatitis  -Status post ERCP on November 9  -Postop day 1 following laparoscopic cholecystectomy.  -More tachycardic and appears intravascularly depleted with an increased hemoglobin at 16.3 and an increased leukocytosis of 31.  Her liver function tests are actually downtrending however.   -BRUNO drain with copious serous output.  760 mL yesterday.  Draining ascites fluid related to her pancreatitis  -Needs aggressive IV fluid resuscitation.  " I will order for a 1 L lactated Ringer's bolus  -Okay for some small clears for comfort  -Continue with supportive management of pancreatitis         Raheem Raza MD  11/11/2024  06:59 EST    Reviewed today's events. Discussed with GI. Reviewed her scan and discussed with her family at the bedside this afternoon. CT scan consistent with severe acute pancreatitis. Continue with supportive management    Electronically signed by Raheem Raza MD, 11/11/24, 4:51 PM EST.

## 2024-11-11 NOTE — PLAN OF CARE
Goal Outcome Evaluation:   Pt on strict NPO. No po meds given. Pain med given x 4 with good relief. MARITZA Franklin notified for pt tachycardia and critical lab. New orders noted. Pt up to bathroom several times. Voiding without difficulty. BRUNO dressing changed x 2 as dressing was saturated. BRUNO output so far this shift was almost 200cc. Pt encouraged to use incentive spirometer. Mother at bedside. Will continue to monitor.

## 2024-11-11 NOTE — PROGRESS NOTES
"GI Daily Progress Note  Subjective:    Chief Complaint:  follow up abdominal pain, post-ERCP pancreatitis    Patient had rapid response called on floor due to tachycardia and tachypnea despite fluid bolus.  Patient resting in bed and reports stable abdominal pain.  She ate some clear liquids this morning and was able to keep them down.  Her grandmother and family member are at bedside.    Objective:    BP (!) 155/105   Pulse 120   Temp 98 °F (36.7 °C) (Oral)   Resp (!) 46   Ht 157.5 cm (62\")   Wt 84.4 kg (186 lb)   LMP 10/29/2024 (Approximate)   SpO2 95%   BMI 34.02 kg/m²     Physical Exam   General: Patient awake, alert and cooperative   Eyes: Normal lids and lashes, no scleral icterus   Neck: Supple, normal ROM   Skin: Warm and dry, not jaundiced   Cardiovascular: tachycardia, well-perfused extremities   Pulm: Equal expansion bilaterally, no increased WOB   Abdomen: Soft, mild distention, drain in place, mild ttp in upper abdomen              Neuro: A&O, No obvious sign of focal deficit   Psychiatric: Normal mood and behavior; memory intact      Lab  Lab Results   Component Value Date    WBC 33.78 (C) 11/11/2024    HGB 15.8 11/11/2024    HGB 16.3 (H) 11/11/2024    HGB 15.8 11/10/2024    MCV 94.0 11/11/2024     (H) 11/11/2024    INR 1.02 11/10/2024       Lab Results   Component Value Date    GLUCOSE 151 (H) 11/11/2024    BUN 11 11/11/2024    CREATININE 0.70 11/11/2024    BCR 15.7 11/11/2024     (L) 11/11/2024    K 4.1 11/11/2024    CO2 18.0 (L) 11/11/2024    CALCIUM 7.7 (L) 11/11/2024    ALBUMIN 2.9 (L) 11/11/2024    ALKPHOS 137 (H) 11/11/2024    BILITOT 1.5 (H) 11/11/2024     (H) 11/11/2024    AST 30 11/11/2024     CT Chest Abd/pelvis  IMPRESSION:  Negative exam for pulmonary embolism. Small bilateral pleural effusions with mild compressive bibasilar atelectasis.     Findings compatible with acute pancreatitis. Moderate amount of abdominal and small amount of pelvic ascites. Small " nonencapsulated fluid collection in the gallbladder fossa, status post cholecystectomy. Small amount of free air is compatible with recent   surgery. Surgical drain is in place.       Assessment:  Choledocholithiasis and Cholelithiasis  Elevated LFTS and Tbili  Post-ERCP Pancreatitis (ERCP with biliary sphincterotomy and balloon sweep 11/9)  SIRS 2/2 Post-ERCP Pancreatitis     Plan:  - WBC worsened overnight, no fever, has had persistent tachycardia and tachypnea  - Given bolus in addition to continuous fluid this AM without improvement in vitals   - Clinically, she reports her pain is stable and controlled and was able to keep down clear liquids this AM.  Pending course, she may require nasoenteral tube for supplemental nutrition  - LFTs/tbili continue to improve  - CT chest, abdomen and pelvis as noted above.  I personally reviewed with findings of acute pancreatitis and resultatn ascites.  RUQ surgical drain in place.  Bilateral pleural effusions.   - RUQ drain draining ascites from pancreatitis  - Continue pain control and anti-emetics as needed  - Bowel regimen titration as needed   - Remains on broad antibiotics, I have low suspicion for infected pancreatitis at this time and suspect her SIRS is secondary severe inflammation from pancreatitis   - Given need for continued aggressive resuscitation measures, I discussed case with ICU and patient will be transferred there this afternoon    Dick Patrick MD  11/11/24  14:50 EST

## 2024-11-11 NOTE — PROGRESS NOTES
Critical Care Note     LOS: 3 days   Patient Care Team:  Eva Tovar MD as PCP - General (Internal Medicine)    Chief Complaint/Reason for visit:    Chief Complaint   Patient presents with    Flank Pain   Acute biliary obstruction  Status post ERCP  Status postcholecystectomy, November 10    Subjective     History of Present Illness:   Eder Wills is a 23 y.o. female who is transferred to ICU this afternoon for higher level of care and closer monitoring.     She was initially admitted to [facility] on 11/7/24 after presenting to our ED for evaluation of a several-week history of RUQ abdominal pain with MRCP showing findings of cholecystitis and choledocholithiasis with a few stones present in the distal CBD. She was placed on empiric Rocephin and Flagyl and GI was consulted with ERCP performed, ultimately showing dilated main bile duct without any stones or filling defects. General surgery was subsequently consulted for which she underwent laparoscopic CCY per Dr. Raza on 11/10. Hospital course has been further complicated by post-ERCP pancreatitis for which she is currently receiving volume resuscitation. GI felt that she would be better served in ICU and she will be transferred this afternoon for closer monitoring.     Time spent: 5 minutes  Electronically signed by Ivis Umaña, MOOK, APRN, 11/11/24, 1:06 PM EST.     Interval History:     Rapid response was called for tachypnea and tachycardia with a near syncopal episode.  She is currently resting in no acute distress.  She denies nausea or vomiting.  She has not had a bowel movement since surgery.  She is tolerating clear liquids.  She received a Norco 5 at 830 and 2 doses of Dilaudid overnight at midnight and 430.  She remains afebrile.  Respiratory rate is now normal but she has persistent resting tachycardia with a heart rate of 120      Review of Systems:    All systems were reviewed and negative except as noted in  "subjective.    Medical history, surgical history, social history, family history reviewed    Objective     Intake/Output:    Intake/Output Summary (Last 24 hours) at 11/11/2024 1447  Last data filed at 11/11/2024 0900  Gross per 24 hour   Intake 2720 ml   Output 1415 ml   Net 1305 ml       Nutrition:  Diet: Liquid; Clear Liquid; Fluid Consistency: Thin (IDDSI 0)    Infusions:  lactated ringers, 150 mL/hr, Last Rate: 150 mL/hr (11/11/24 1118)        Mechanical Ventilator Settings:                                                Telemetry: Sinus tachycardia             Vital Signs  Blood pressure (!) 155/105, pulse 120, temperature 98 °F (36.7 °C), temperature source Oral, resp. rate (!) 46, height 157.5 cm (62\"), weight 84.4 kg (186 lb), last menstrual period 10/29/2024, SpO2 95%.    Physical Exam:  General Appearance:  Young woman in no acute distress   Head:  Atraumatic   Eyes:          Conjunctiva pink, I do not appreciate jaundice   Ears:     Throat: Oral mucosa moist   Neck: Supple, no crepitus   Back:      Lungs:   Symmetric chest expansion.  Breath sounds are clear    Heart:  Increased rate, regular, S1, S2 auscultated   Abdomen:   Laparoscopic incisions intact without bleeding.  Hypoactive bowel sounds, mildly tender   Rectal:   Deferred   Extremities: No edema, nailbeds pink   Pulses:    Skin:    Lymph nodes:    Neurologic: Resting, oriented x 3, answers questions appropriately      Results Review:     I reviewed the patient's new clinical results.   Results from last 7 days   Lab Units 11/11/24  1239 11/11/24  0132 11/10/24  0522   SODIUM mmol/L 132* 135* 136   POTASSIUM mmol/L 4.1 4.3 4.7   CHLORIDE mmol/L 100 103 97*   CO2 mmol/L 18.0* 23.0 23.0   BUN mg/dL 11 11 10   CREATININE mg/dL 0.70 0.63 0.71   CALCIUM mg/dL 7.7* 7.9* 9.4   BILIRUBIN mg/dL 1.5* 1.7* 2.4*   ALK PHOS U/L 137* 183* 294*   ALT (SGPT) U/L 136* 195* 351*   AST (SGOT) U/L 30 39* 86*   GLUCOSE mg/dL 151* 155* 166*     Results from last 7 " "days   Lab Units 11/11/24  1239 11/11/24  0132 11/10/24  0522   WBC 10*3/mm3 33.78* 31.11* 18.12*   HEMOGLOBIN g/dL 15.8 16.3* 15.8   HEMATOCRIT % 48.9* 49.3* 47.6*   PLATELETS 10*3/mm3 465* 483* 450         Lab Results   Component Value Date    BLOODCX No growth at 3 days 11/08/2024     No results found for: \"URINECX\"    I reviewed the patient's new imaging including images and reports.    CT ANGIOGRAM CHEST, CT ABD W CONTRAST PELVIS W WO CONTRAST    Date of Exam: 11/11/2024 12:46 PM EST    Indication: pleuritic chest pasin, tachycardia.    Comparison: CT abdomen and pelvis of 11/8/2024.    Technique: CTA of the chest was performed before and after the uneventful intravenous administration of 85 mL Isovue-370. Reconstructed coronal and sagittal images were also obtained. In addition, a 3-D volume rendered image was created for  interpretation. Automated exposure control and iterative reconstruction methods were used.      Findings:  CT chest: There are no filling defects suspicious for pulmonary emboli. There are no enlarged mediastinal nodes. There are no hilar masses. There are small bilateral pleural effusions. There is compressive atelectasis in the lung bases. The heart size is   normal. There is no pericardial effusion.    CT abdomen and pelvis: The gallbladder has been removed. An oval nonenhancing fluid collection is identified in the gallbladder fossa measuring 6 x 2 cm. There is a surgical drain entering the right abdomen having its tip near the gallbladder fossa.  There is new moderately large amount of abdominal ascites and mild amount of pelvic ascites. There is peripancreatic soft tissue stranding and fluid. There is no evidence of a focal enhancing or encapsulated fluid collection. There is no bile duct  dilatation. There are no focal liver lesions. Spleen and adrenal glands appear normal. There are no renal lesions or hydronephrosis. Partially filled bladder appears grossly normal. There is no " pelvic mass. There is no large or small bowel dilatation.  There is a small amount of free intraperitoneal air identified over the left hepatic lobe. The stomach is collapsed.   Impression:     Negative exam for pulmonary embolism. Small bilateral pleural effusions with mild compressive bibasilar atelectasis.    Findings compatible with acute pancreatitis. Moderate amount of abdominal and small amount of pelvic ascites. Small nonencapsulated fluid collection in the gallbladder fossa, status post cholecystectomy. Small amount of free air is compatible with recent   surgery. Surgical drain is in place.      Electronically Signed: Kristi Matta MD   11/11/2024 1:40 PM EST     All medications reviewed.   albumin human, 500 mL, Intravenous, Once  bisacodyl, 10 mg, Oral, Daily  docusate sodium, 100 mg, Oral, BID  FLUoxetine, 40 mg, Oral, Daily  heparin (porcine), 5,000 Units, Subcutaneous, Q8H  metoclopramide, 10 mg, Intravenous, Q6H  pantoprazole, 40 mg, Intravenous, Q AM  piperacillin-tazobactam, 3.375 g, Intravenous, Q8H          Assessment & Plan       Biliary obstruction    Calculus of gallbladder and bile duct with obstruction    Mood disorder    Obesity (BMI 30-39.9)    Post-ERCP acute pancreatitis      23-year-old woman presenting on the evening of October 8 with biliary obstruction, jaundice and pain.  She underwent endoscopic retrograde cholangiogram without obstruction, biliary sphincterotomy and balloon sweep on November 9.  She underwent laparoscopic cholecystectomy on November 10.  She developed some post ERCP pancreatitis and was placed on fluids and antiemetics.  Today she developed pleuritic chest pain and became tachypneic and tachycardic.  CTA of the chest was negative for PE but revealed small pleural effusions with some bibasilar atelectasis.  She had some fluid collection in the gallbladder fossa and the surgical drain in place.  There is a new moderately large abdominal ascites with some dilated  small bowel.  CT of the abdomen revealed evidence of acute pancreatitis with peripancreatic stranding as well as the ascites seen on her CTA of the chest.  White count is markedly elevated today at 33.8 compared to 11 yesterday.  Hemoglobin remains normal at 15.8.  Calcium is decreased at 7.7 and she has developed a metabolic acidosis with a serum bicarb of 18.  Gap is 14 which is only slightly increased.  Lipase is now 1257.  She is not vomiting.  There is no evidence of pancreatic abscess.  Total calcium is slightly decreased at 7.7    PLAN:    Monitor LFTs, lipase  Monitor lactic acid, ionized calcium  Continue Zosyn  Hydration    VTE Prophylaxis: subcutaneous heparin    Stress Ulcer Prophylaxis: Protonix    Isabel Nice MD  11/11/24  14:47 EST      Time: Critical care 35 min  I personally provided care to this critically ill patient as documented above.  Critical care time does not include time spent on separately billed procedures.  None of my critical care time was concurrent with other critical care providers.

## 2024-11-11 NOTE — NURSING NOTE
Patient's HR and RR began increasing, noted with afternoon vitals. LR 1L bolus completed due to concerns for tachycardia from dehydration, however no improvement noted. MD already notified prior of critical WBC result via night shift RN. RN began Q15 vitals after notifying MARITZA Masters at 113, RN was awaiting orders at this time while APRN was consulting with Rafael Castellon MD (attending) regarding patient concerns. RN called rapid response at 1210 as patient's RR elevated to 43, , patient also notably lethargic and diaphoretic (BP was 129/88, 94% on RA, 98 oral temp). Blood sugar resulted 140, STAT EKG obtained. STAT CBC, CMP, and blood cultures ordered (obtained at bedside). STAT CT of chest and abdomen-pelvis and transfer order obtained. Patient ordered to transfer to ICU per Gastroenterology. RN and house supervisor took patient for CT scans, verbal bedside report for transfer completed with LEYDA Schmitz. Family also notified of rapid response and transfer order at bedside.

## 2024-11-12 LAB
ALBUMIN SERPL-MCNC: 3.4 G/DL (ref 3.5–5.2)
ALBUMIN/GLOB SERPL: 2.8 G/DL
ALP SERPL-CCNC: 73 U/L (ref 39–117)
ALT SERPL W P-5'-P-CCNC: 81 U/L (ref 1–33)
ANION GAP SERPL CALCULATED.3IONS-SCNC: 10 MMOL/L (ref 5–15)
ANION GAP SERPL CALCULATED.3IONS-SCNC: 8 MMOL/L (ref 5–15)
AST SERPL-CCNC: 24 U/L (ref 1–32)
BASOPHILS # BLD AUTO: 0.03 10*3/MM3 (ref 0–0.2)
BASOPHILS NFR BLD AUTO: 0.1 % (ref 0–1.5)
BILIRUB SERPL-MCNC: 1.7 MG/DL (ref 0–1.2)
BUN SERPL-MCNC: 7 MG/DL (ref 6–20)
BUN SERPL-MCNC: 8 MG/DL (ref 6–20)
BUN/CREAT SERPL: 15.7 (ref 7–25)
BUN/CREAT SERPL: 16.3 (ref 7–25)
CA-I SERPL ISE-MCNC: 1.04 MMOL/L (ref 1.15–1.3)
CALCIUM SPEC-SCNC: 7.6 MG/DL (ref 8.6–10.5)
CALCIUM SPEC-SCNC: 7.6 MG/DL (ref 8.6–10.5)
CHLORIDE SERPL-SCNC: 101 MMOL/L (ref 98–107)
CHLORIDE SERPL-SCNC: 104 MMOL/L (ref 98–107)
CO2 SERPL-SCNC: 23 MMOL/L (ref 22–29)
CO2 SERPL-SCNC: 24 MMOL/L (ref 22–29)
CREAT SERPL-MCNC: 0.43 MG/DL (ref 0.57–1)
CREAT SERPL-MCNC: 0.51 MG/DL (ref 0.57–1)
CYTO UR: NORMAL
D-LACTATE SERPL-SCNC: 1 MMOL/L (ref 0.5–2)
DEPRECATED RDW RBC AUTO: 45.6 FL (ref 37–54)
EGFRCR SERPLBLD CKD-EPI 2021: 136.4 ML/MIN/1.73
EGFRCR SERPLBLD CKD-EPI 2021: 142.1 ML/MIN/1.73
EOSINOPHIL # BLD AUTO: 0 10*3/MM3 (ref 0–0.4)
EOSINOPHIL NFR BLD AUTO: 0 % (ref 0.3–6.2)
ERYTHROCYTE [DISTWIDTH] IN BLOOD BY AUTOMATED COUNT: 13.7 % (ref 12.3–15.4)
GLOBULIN UR ELPH-MCNC: 1.2 GM/DL
GLUCOSE SERPL-MCNC: 116 MG/DL (ref 65–99)
GLUCOSE SERPL-MCNC: 130 MG/DL (ref 65–99)
HCT VFR BLD AUTO: 33.3 % (ref 34–46.6)
HCT VFR BLD AUTO: 35 % (ref 34–46.6)
HCT VFR BLD AUTO: 35.4 % (ref 34–46.6)
HGB BLD-MCNC: 11 G/DL (ref 12–15.9)
HGB BLD-MCNC: 11.6 G/DL (ref 12–15.9)
HGB BLD-MCNC: 11.8 G/DL (ref 12–15.9)
IMM GRANULOCYTES # BLD AUTO: 0.18 10*3/MM3 (ref 0–0.05)
IMM GRANULOCYTES NFR BLD AUTO: 0.8 % (ref 0–0.5)
LAB AP CASE REPORT: NORMAL
LAB AP CLINICAL INFORMATION: NORMAL
LIPASE SERPL-CCNC: 607 U/L (ref 13–60)
LYMPHOCYTES # BLD AUTO: 1.21 10*3/MM3 (ref 0.7–3.1)
LYMPHOCYTES NFR BLD AUTO: 5.2 % (ref 19.6–45.3)
MAGNESIUM SERPL-MCNC: 1.7 MG/DL (ref 1.6–2.6)
MAGNESIUM SERPL-MCNC: 2.6 MG/DL (ref 1.6–2.6)
MCH RBC QN AUTO: 30.1 PG (ref 26.6–33)
MCHC RBC AUTO-ENTMCNC: 33.1 G/DL (ref 31.5–35.7)
MCV RBC AUTO: 90.9 FL (ref 79–97)
MONOCYTES # BLD AUTO: 1.48 10*3/MM3 (ref 0.1–0.9)
MONOCYTES NFR BLD AUTO: 6.3 % (ref 5–12)
NEUTROPHILS NFR BLD AUTO: 20.48 10*3/MM3 (ref 1.7–7)
NEUTROPHILS NFR BLD AUTO: 87.6 % (ref 42.7–76)
NRBC BLD AUTO-RTO: 0 /100 WBC (ref 0–0.2)
PATH REPORT.FINAL DX SPEC: NORMAL
PATH REPORT.GROSS SPEC: NORMAL
PHOSPHATE SERPL-MCNC: 1 MG/DL (ref 2.5–4.5)
PHOSPHATE SERPL-MCNC: 2 MG/DL (ref 2.5–4.5)
PLATELET # BLD AUTO: 288 10*3/MM3 (ref 140–450)
PMV BLD AUTO: 10.4 FL (ref 6–12)
POTASSIUM SERPL-SCNC: 3.6 MMOL/L (ref 3.5–5.2)
POTASSIUM SERPL-SCNC: 4.2 MMOL/L (ref 3.5–5.2)
PROT SERPL-MCNC: 4.6 G/DL (ref 6–8.5)
RBC # BLD AUTO: 3.85 10*6/MM3 (ref 3.77–5.28)
SODIUM SERPL-SCNC: 134 MMOL/L (ref 136–145)
SODIUM SERPL-SCNC: 136 MMOL/L (ref 136–145)
WBC NRBC COR # BLD AUTO: 23.38 10*3/MM3 (ref 3.4–10.8)

## 2024-11-12 PROCEDURE — 84100 ASSAY OF PHOSPHORUS: CPT | Performed by: INTERNAL MEDICINE

## 2024-11-12 PROCEDURE — 25810000003 LACTATED RINGERS PER 1000 ML: Performed by: INTERNAL MEDICINE

## 2024-11-12 PROCEDURE — 85014 HEMATOCRIT: CPT | Performed by: INTERNAL MEDICINE

## 2024-11-12 PROCEDURE — 25010000002 PIPERACILLIN SOD-TAZOBACTAM PER 1 G: Performed by: INTERNAL MEDICINE

## 2024-11-12 PROCEDURE — 97161 PT EVAL LOW COMPLEX 20 MIN: CPT

## 2024-11-12 PROCEDURE — 25010000002 MAGNESIUM SULFATE 4 GM/100ML SOLUTION: Performed by: INTERNAL MEDICINE

## 2024-11-12 PROCEDURE — 80053 COMPREHEN METABOLIC PANEL: CPT | Performed by: INTERNAL MEDICINE

## 2024-11-12 PROCEDURE — 82330 ASSAY OF CALCIUM: CPT | Performed by: INTERNAL MEDICINE

## 2024-11-12 PROCEDURE — 25010000002 HYDROMORPHONE PER 4 MG: Performed by: INTERNAL MEDICINE

## 2024-11-12 PROCEDURE — 83605 ASSAY OF LACTIC ACID: CPT | Performed by: INTERNAL MEDICINE

## 2024-11-12 PROCEDURE — 83735 ASSAY OF MAGNESIUM: CPT | Performed by: INTERNAL MEDICINE

## 2024-11-12 PROCEDURE — 83690 ASSAY OF LIPASE: CPT | Performed by: INTERNAL MEDICINE

## 2024-11-12 PROCEDURE — 25010000002 METOCLOPRAMIDE PER 10 MG: Performed by: SURGERY

## 2024-11-12 PROCEDURE — 85025 COMPLETE CBC W/AUTO DIFF WBC: CPT | Performed by: INTERNAL MEDICINE

## 2024-11-12 PROCEDURE — 25010000002 HYDROMORPHONE PER 4 MG: Performed by: SURGERY

## 2024-11-12 PROCEDURE — 85018 HEMOGLOBIN: CPT | Performed by: INTERNAL MEDICINE

## 2024-11-12 PROCEDURE — 25810000003 SODIUM CHLORIDE 0.9 % SOLUTION: Performed by: INTERNAL MEDICINE

## 2024-11-12 PROCEDURE — 99232 SBSQ HOSP IP/OBS MODERATE 35: CPT | Performed by: INTERNAL MEDICINE

## 2024-11-12 PROCEDURE — 25010000002 HEPARIN (PORCINE) PER 1000 UNITS: Performed by: SURGERY

## 2024-11-12 RX ORDER — HYDROMORPHONE HYDROCHLORIDE 1 MG/ML
0.25 INJECTION, SOLUTION INTRAMUSCULAR; INTRAVENOUS; SUBCUTANEOUS
Status: DISCONTINUED | OUTPATIENT
Start: 2024-11-12 | End: 2024-11-13

## 2024-11-12 RX ORDER — POTASSIUM CHLORIDE 1.5 G/1.58G
40 POWDER, FOR SOLUTION ORAL EVERY 4 HOURS
Status: COMPLETED | OUTPATIENT
Start: 2024-11-12 | End: 2024-11-12

## 2024-11-12 RX ORDER — FENTANYL/ROPIVACAINE/NS/PF 2-625MCG/1
15 PLASTIC BAG, INJECTION (ML) EPIDURAL
Status: COMPLETED | OUTPATIENT
Start: 2024-11-12 | End: 2024-11-12

## 2024-11-12 RX ORDER — NALOXONE HCL 0.4 MG/ML
0.4 VIAL (ML) INJECTION
Status: DISCONTINUED | OUTPATIENT
Start: 2024-11-12 | End: 2024-11-15 | Stop reason: HOSPADM

## 2024-11-12 RX ORDER — MAGNESIUM SULFATE HEPTAHYDRATE 40 MG/ML
4 INJECTION, SOLUTION INTRAVENOUS ONCE
Status: COMPLETED | OUTPATIENT
Start: 2024-11-12 | End: 2024-11-12

## 2024-11-12 RX ADMIN — PIPERACILLIN AND TAZOBACTAM 3.38 G: 3; .375 INJECTION, POWDER, LYOPHILIZED, FOR SOLUTION INTRAVENOUS at 09:01

## 2024-11-12 RX ADMIN — HEPARIN SODIUM 5000 UNITS: 5000 INJECTION INTRAVENOUS; SUBCUTANEOUS at 22:20

## 2024-11-12 RX ADMIN — MAGNESIUM SULFATE HEPTAHYDRATE 4 G: 40 INJECTION, SOLUTION INTRAVENOUS at 09:01

## 2024-11-12 RX ADMIN — POTASSIUM CHLORIDE 40 MEQ: 1.5 POWDER, FOR SOLUTION ORAL at 09:01

## 2024-11-12 RX ADMIN — PIPERACILLIN AND TAZOBACTAM 3.38 G: 3; .375 INJECTION, POWDER, LYOPHILIZED, FOR SOLUTION INTRAVENOUS at 16:11

## 2024-11-12 RX ADMIN — METOCLOPRAMIDE 10 MG: 5 INJECTION, SOLUTION INTRAMUSCULAR; INTRAVENOUS at 02:18

## 2024-11-12 RX ADMIN — MUPIROCIN 1 APPLICATION: 20 OINTMENT TOPICAL at 20:38

## 2024-11-12 RX ADMIN — HYDROCODONE BITARTRATE AND ACETAMINOPHEN 1 TABLET: 5; 325 TABLET ORAL at 10:31

## 2024-11-12 RX ADMIN — DOCUSATE SODIUM 100 MG: 100 CAPSULE, LIQUID FILLED ORAL at 20:38

## 2024-11-12 RX ADMIN — HYDROCODONE BITARTRATE AND ACETAMINOPHEN 1 TABLET: 5; 325 TABLET ORAL at 18:41

## 2024-11-12 RX ADMIN — METOCLOPRAMIDE 10 MG: 5 INJECTION, SOLUTION INTRAMUSCULAR; INTRAVENOUS at 20:38

## 2024-11-12 RX ADMIN — POTASSIUM & SODIUM PHOSPHATES POWDER PACK 280-160-250 MG 2 PACKET: 280-160-250 PACK at 10:32

## 2024-11-12 RX ADMIN — BISACODYL 10 MG: 5 TABLET, COATED ORAL at 09:01

## 2024-11-12 RX ADMIN — HEPARIN SODIUM 5000 UNITS: 5000 INJECTION INTRAVENOUS; SUBCUTANEOUS at 13:54

## 2024-11-12 RX ADMIN — POTASSIUM PHOSPHATE, MONOBASIC POTASSIUM PHOSPHATE, DIBASIC 15 MMOL: 224; 236 INJECTION, SOLUTION, CONCENTRATE INTRAVENOUS at 12:08

## 2024-11-12 RX ADMIN — HYDROMORPHONE HYDROCHLORIDE 0.25 MG: 1 INJECTION, SOLUTION INTRAMUSCULAR; INTRAVENOUS; SUBCUTANEOUS at 20:38

## 2024-11-12 RX ADMIN — FLUOXETINE HYDROCHLORIDE 40 MG: 20 CAPSULE ORAL at 09:01

## 2024-11-12 RX ADMIN — PANTOPRAZOLE SODIUM 40 MG: 40 INJECTION, POWDER, FOR SOLUTION INTRAVENOUS at 05:33

## 2024-11-12 RX ADMIN — DOCUSATE SODIUM 100 MG: 100 CAPSULE, LIQUID FILLED ORAL at 09:01

## 2024-11-12 RX ADMIN — POTASSIUM PHOSPHATE, MONOBASIC POTASSIUM PHOSPHATE, DIBASIC 15 MMOL: 224; 236 INJECTION, SOLUTION, CONCENTRATE INTRAVENOUS at 09:01

## 2024-11-12 RX ADMIN — HYDROMORPHONE HYDROCHLORIDE 0.5 MG: 1 INJECTION, SOLUTION INTRAMUSCULAR; INTRAVENOUS; SUBCUTANEOUS at 05:33

## 2024-11-12 RX ADMIN — POTASSIUM PHOSPHATE, MONOBASIC POTASSIUM PHOSPHATE, DIBASIC 15 MMOL: 224; 236 INJECTION, SOLUTION, CONCENTRATE INTRAVENOUS at 15:59

## 2024-11-12 RX ADMIN — HEPARIN SODIUM 5000 UNITS: 5000 INJECTION INTRAVENOUS; SUBCUTANEOUS at 05:33

## 2024-11-12 RX ADMIN — SODIUM CHLORIDE, SODIUM LACTATE, POTASSIUM CHLORIDE, CALCIUM CHLORIDE 100 ML/HR: 20; 30; 600; 310 INJECTION, SOLUTION INTRAVENOUS at 10:32

## 2024-11-12 RX ADMIN — METOCLOPRAMIDE 10 MG: 5 INJECTION, SOLUTION INTRAMUSCULAR; INTRAVENOUS at 16:11

## 2024-11-12 RX ADMIN — METOCLOPRAMIDE 10 MG: 5 INJECTION, SOLUTION INTRAMUSCULAR; INTRAVENOUS at 09:01

## 2024-11-12 RX ADMIN — MUPIROCIN 1 APPLICATION: 20 OINTMENT TOPICAL at 09:01

## 2024-11-12 RX ADMIN — HYDROMORPHONE HYDROCHLORIDE 0.25 MG: 1 INJECTION, SOLUTION INTRAMUSCULAR; INTRAVENOUS; SUBCUTANEOUS at 16:00

## 2024-11-12 RX ADMIN — PIPERACILLIN AND TAZOBACTAM 3.38 G: 3; .375 INJECTION, POWDER, LYOPHILIZED, FOR SOLUTION INTRAVENOUS at 23:57

## 2024-11-12 RX ADMIN — HYDROCODONE BITARTRATE AND ACETAMINOPHEN 1 TABLET: 5; 325 TABLET ORAL at 02:18

## 2024-11-12 NOTE — PROGRESS NOTES
"GI Daily Progress Note  Subjective:    Chief Complaint:  follow up abdominal pain    Patient reports some improvement in abdominal pain today.  Trying to use incentive spirometry.  Reports passing gas this morning just prior to encounter.    Objective:    BP (!) 148/102   Pulse 105   Temp 98.6 °F (37 °C) (Oral)   Resp 24   Ht 157.5 cm (62\")   Wt 84.4 kg (186 lb)   LMP 10/29/2024 (Approximate)   SpO2 96%   BMI 34.02 kg/m²     Physical Exam   General: Patient awake, alert and cooperative   Eyes: Normal lids and lashes, no scleral icterus   Cardiovascular: tachycardia but improved in comparison to yesterday   Pulm: Equal expansion bilaterally although somewhat shallow breathing   Abdomen: Soft, ttp in upper abdomen              Neuro: A&O, No obvious sign of focal deficit   Psychiatric: Normal mood and behavior;    Lab  Lab Results   Component Value Date    WBC 23.38 (H) 11/12/2024    HGB 11.6 (L) 11/12/2024    HGB 15.8 11/11/2024    HGB 16.3 (H) 11/11/2024    MCV 90.9 11/12/2024     11/12/2024    INR 1.02 11/10/2024       Lab Results   Component Value Date    GLUCOSE 116 (H) 11/12/2024    BUN 8 11/12/2024    CREATININE 0.51 (L) 11/12/2024    BCR 15.7 11/12/2024     (L) 11/12/2024    K 3.6 11/12/2024    CO2 23.0 11/12/2024    CALCIUM 7.6 (L) 11/12/2024    ALBUMIN 3.4 (L) 11/12/2024    ALKPHOS 73 11/12/2024    BILITOT 1.7 (H) 11/12/2024    ALT 81 (H) 11/12/2024    AST 24 11/12/2024       Assessment:  Choledocholithiasis and Cholelithiasis  Elevated LFTS and Tbili  Post-ERCP Pancreatitis (ERCP with biliary sphincterotomy and balloon sweep 11/9)  SIRS 2/2 Post-ERCP Pancreatitis    Plan:  - LFTs/tbili continue to improve  - CT chest, abdomen and pelvis yesterday with findings of acute pancreatitis and resultatn ascites.  RUQ surgical drain in place.  Bilateral pleural effusions.   - RUQ drain draining ascites from pancreatitis  - Continue pain control and anti-emetics as needed  - Bowel regimen " titration as needed, had flatus prior to encounter this AM   - Remains on broad antibiotics, I have low suspicion for infected pancreatitis at this time and suspect her SIRS is secondary severe inflammation from pancreatitis   - IV LR  - Labs overall improving, continue to monitor electrolytes and replace PRN  - Continue supportive care in the ICU    Dick Patrick MD  11/12/24  10:13 EST

## 2024-11-12 NOTE — PLAN OF CARE
Goal Outcome Evaluation:   VSS, no acute events overnight. Patient has had copious output from BRUNO drain and site, placed bigger BRUNO bulb suction to drain, changed dressing x3. Patient has remained tachypneic throughout shift, reporting that it just hurts to breathe deep and slow. Tachycardic, flushed, but remains afebrile. Patient up to BSC almost hourly, but had small UOP each time, denies burning or pain. Clear liquid diet. PRN's utilized, family remained at bedside. Updated throughout care.

## 2024-11-12 NOTE — PROGRESS NOTES
"Patient Name:  Eder Wills  YOB: 2000  [id number]    Surgery Progress Note    Date of visit: 11/12/2024      Subjective: Clinical improvement overnight.  Did sleep some.  Still has abdominal soreness and bloating.  Reports that she has had some hiccuping and burping.  No flatus or bowel movement.  Denies nausea.  Has had some coughing.  Has had some small amount of clear liquids          Objective:     BP (!) 147/101   Pulse 104   Temp 99.2 °F (37.3 °C) (Oral)   Resp (!) 36   Ht 157.5 cm (62\")   Wt 84.4 kg (186 lb)   LMP 10/29/2024 (Approximate)   SpO2 94%   BMI 34.02 kg/m²     Intake/Output Summary (Last 24 hours) at 11/12/2024 0814  Last data filed at 11/12/2024 0800  Gross per 24 hour   Intake 39508 ml   Output 1675 ml   Net 9545 ml       GEN:   Awake, alert, in no acute distress, resting in bed, appears uncomfortable but in no distress  CV:   Regular rate and rhythm  L:  Symmetric expansion, not labored on oxygen by nasal cannula  Abd:  Soft, moderately distended throughout, incisions are clean dry and intact, BRUNO drain in the right upper quadrant draining some slightly cloudy ascites fluid  Ext:  No cyanosis, clubbing, or edema    Recent labs that are back at this time have been reviewed.           Assessment/ Plan:        Mrs. Wills is a 23-year-old lady without significant past medical history with choledocholithiasis      # Choledocholithiasis  # Pancreatitis  -Status post ERCP on November 9  -Postop day 2 following laparoscopic cholecystectomy.  -Clinical parameters show improvement today.  Tachycardia improved.  White blood cell count down to 22.  Liver function test not significantly elevated  -CT from yesterday reviewed.  Consistent with severe pancreatitis.  -BRUNO draining ascites fluid as would be expected with pancreatitis.  900 mL of output yesterday  -Continue with supportive management    Raheem Raza MD  11/12/2024  08:14 EST      "

## 2024-11-12 NOTE — PLAN OF CARE
Goal Outcome Evaluation:  -tmax 99.6   -up to chair x4 hours today   -ambulated in rocha x2  -incentive spirometer encouraged   -uop adequate; did bladder scan to check for retention and 0mls in bladder at time   -prn pain control with norco and dilaudid; patient drowsy most of day; adjusted dosing on pain meds   -tried to wean oxygen on nasal cannual and o2sats decreased to 87%; reapplied 2LNC   -electrolytes replaced per protocol   -ivf decreased to 100 ml/hr   -BRUNO drain output 325 mls

## 2024-11-12 NOTE — PLAN OF CARE
Goal Outcome Evaluation:  Plan of Care Reviewed With: patient, family           Outcome Evaluation: PT eval complete. Pt presents with balance deficits and decreased functional endurance warranting skilled IP PT services. Pt amb 100' +100' w/ B UE support on monitor, CGA. PT anticipate home w/ assist at d/c, will continue to monitor status. Educated pt and family on importance of continued mobility, pt verbalizes understanding.    Anticipated Discharge Disposition (PT): home with assist

## 2024-11-12 NOTE — THERAPY EVALUATION
Patient Name: Eder Wills  : 2000    MRN: [id number]                              Today's Date: 2024       Admit Date: 2024    Visit Dx:     ICD-10-CM ICD-9-CM   1. Calculus of gallbladder and bile duct with obstruction without cholecystitis  K80.71 574.91   2. Jaundice  R17 782.4   3. Cholecystitis  K81.9 575.10     Patient Active Problem List   Diagnosis    Calculus of gallbladder and bile duct with obstruction    Biliary obstruction    Mood disorder    Obesity (BMI 30-39.9)    Post-ERCP acute pancreatitis     Past Medical History:   Diagnosis Date    Obesity (BMI 30-39.9) 2024     Past Surgical History:   Procedure Laterality Date    ADENOIDECTOMY      CHOLECYSTECTOMY N/A 11/10/2024    Procedure: CHOLECYSTECTOMY LAPAROSCOPIC;  Surgeon: Raheem Raza MD;  Location:  [facility] OR;  Service: General;  Laterality: N/A;    COSMETIC SURGERY          ERCP N/A 2024    Procedure: ENDOSCOPIC RETROGRADE CHOLANGIOPANCREATOGRAPHY;  Surgeon: Dick Patrick MD;  Location:  [facility];  Service: Gastroenterology;  Laterality: N/A;    TONSILLECTOMY        General Information       Row Name 24          Physical Therapy Time and Intention    Document Type evaluation  -KE     Mode of Treatment physical therapy  -       Row Name 24 [facility]          General Information    Patient Profile Reviewed yes  -KE     Prior Level of Function independent:;all household mobility;community mobility;gait;ADL's;driving  -KE     Existing Precautions/Restrictions fall  BRUNO drain  -KE     Barriers to Rehab none identified  -       Row Name 24 143          Living Environment    People in Home friend(s)  student at , lives w/ friends; plans to d/c home w/ parents  -       Row Name 24 143          Home Main Entrance    Number of Stairs, Main Entrance four  -KE     Stair Railings, Main Entrance railing on left side (ascending)  -       Row Name 24 1436           Stairs Within Home, Primary    Stairs, Within Home, Primary flight to primary bedroom  -KE     Number of Stairs, Within Home, Primary twelve  -KE     Stair Railings, Within Home, Primary railing on right side (ascending)  -       Row Name 11/12/24 1434          Cognition    Orientation Status (Cognition) oriented x 3  -KE       Row Name 11/12/24 1434          Safety Issues/Impairments Affecting Functional Mobility    Safety Issues Affecting Function (Mobility) awareness of need for assistance;insight into deficits/self-awareness;safety precaution awareness;impulsivity  -KE     Impairments Affecting Function (Mobility) balance;endurance/activity tolerance;pain  -KE               User Key  (r) = Recorded By, (t) = Taken By, (c) = Cosigned By      Initials Name Provider Type    Shae Damon, PT Physical Therapist                   Mobility       Row Name 11/12/24 1437          Bed Mobility    Bed Mobility supine-sit  -KE     Supine-Sit [place] (Bed Mobility) supervision  -     Assistive Device (Bed Mobility) head of bed elevated  -     Comment, (Bed Mobility) VCs for safety awareness and management of lines  -       Row Name 11/12/24 1437          Transfers    Comment, (Transfers) VCs for maintaining eyes open  -[facility] Name 11/12/24 1437          Sit-Stand Transfer    Sit-Stand [place] (Transfers) standby assist  -     Comment, (Sit-Stand Transfer) Pt impulsively standing from EOB multiple times, VCs for line management/awareness  -       Row Name 11/12/24 1437          Gait/Stairs (Locomotion)    [place] Level (Gait) contact guard  -     Assistive Device (Gait) other (see comments)  B UE support on monitor  -KE     Patient was able to Ambulate yes  -KE     Distance in Feet (Gait) 100  +100'  -KE     Deviations/Abnormal Patterns (Gait) bilateral deviations;gait speed decreased;stride length decreased  -KE     Bilateral Gait Deviations forward flexed posture;heel strike  decreased  -KE     Comment, (Gait/Stairs) Demo step through gait pattern with fwd flexed posture, slowed gait speed, and decreased heel strike. VCs for maintaining eyes open, upright posturing, and fwd gaze. Further mobility limited by fatigue. Chair follow for safety, pt requiring one seated rest break.  -KE               User Key  (r) = Recorded By, (t) = Taken By, (c) = Cosigned By      Initials Name Provider Type    Shae Damon PT Physical Therapist                   Obj/Interventions       Row Name 11/12/24 1444          Range of Motion Comprehensive    General Range of Motion bilateral lower extremity ROM WFL  -       Row Name 11/12/24 1444          Strength Comprehensive (MMT)    General Manual Muscle Testing (MMT) Assessment no strength deficits identified  -JENNIFER     Comment, General Manual Muscle Testing (MMT) Assessment B LEs grossly 5/5  -KE       Row Name 11/12/24 1444          Balance    Balance Assessment sitting static balance;sitting dynamic balance;standing static balance;standing dynamic balance  -KE     Static Sitting Balance standby assist  -KE     Dynamic Sitting Balance standby assist  -KE     Position, Sitting Balance sitting edge of bed  -KE     Static Standing Balance standby assist  -KE     Dynamic Standing Balance contact guard  -KE     Position/Device Used, Standing Balance supported;other (see comments)  B UE support on monitor  -KE     Balance Interventions sitting;standing;sit to stand;supported;static;dynamic  -KE     Comment, Balance no overt LOB  -KE       Row Name 11/12/24 1442          Sensory Assessment (Somatosensory)    Sensory Assessment (Somatosensory) LE sensation intact  -KE               User Key  (r) = Recorded By, (t) = Taken By, (c) = Cosigned By      Initials Name Provider Type    Shae Damon PT Physical Therapist                   Goals/Plan       Row Name 11/12/24 1450          Bed Mobility Goal 1 (PT)    Activity/Assistive Device (Bed Mobility Goal  1, PT) sit to supine/supine to sit  -KE     [place] Level/Cues Needed (Bed Mobility Goal 1, PT) independent  -KE     Time Frame (Bed Mobility Goal 1, PT) short term goal (STG);3 days  -KE     Progress/Outcomes (Bed Mobility Goal 1, PT) new goal  -       Row Name 11/12/24 4136          Transfer Goal 1 (PT)    Activity/Assistive Device (Transfer Goal 1, PT) sit-to-stand/stand-to-sit;bed-to-chair/chair-to-bed  -KE     [place] Level/Cues Needed (Transfer Goal 1, PT) independent  -KE     Time Frame (Transfer Goal 1, PT) long term goal (LTG);10 days  -KE     Progress/Outcome (Transfer Goal 1, PT) new [facility]  -       Row Name 11/12/24 4000          Gait Training Goal 1 (PT)    Activity/Assistive Device (Gait Training Goal 1, PT) gait (walking locomotion);improve balance and speed;increase endurance/gait distance;decrease fall risk  -KE     [place] Level (Gait Training Goal 1, PT) independent  -KE     Distance (Gait Training Goal 1, PT) 300  -KE     Time Frame (Gait Training Goal 1, PT) long term goal (LTG);10 days  -KE     Progress/Outcome (Gait Training Goal 1, PT) new goal  -       Row Name 11/12/24 3678          Stairs Goal 1 (PT)    Activity/Assistive Device (Stairs Goal 1, PT) stairs, all skills  -KE     [place] Level/Cues Needed (Stairs Goal 1, PT) modified independence  -KE     Number of Stairs (Stairs Goal 1, PT) 10  -KE     Time Frame (Stairs Goal 1, PT) long term goal (LTG);10 days  -KE     Progress/Outcome (Stairs Goal 1, PT) new goal  -       Row Name 11/12/24 8677          Therapy Assessment/Plan (PT)    Planned Therapy Interventions (PT) balance training;bed mobility training;gait training;home exercise program;neuromuscular re-education;postural re-education;transfer training;patient/family education;stretching;strengthening;stair training;ROM (range of motion)  -KE               User Key  (r) = Recorded By, (t) = Taken By, (c) = Cosigned By      Initials Name Provider Type    KE  Shae Molina, PT Physical Therapist                   Clinical Impression       Row Name 11/12/24 1447          Pain    Pretreatment Pain Rating 5/10  -KE     Posttreatment Pain Rating 5/10  -KE     Pain Location abdomen  -KE     Pain Side/Orientation lower  -KE     Pain Management Interventions positioning techniques utilized;premedicated for activity;heat applied;exercise or physical activity utilized  -KE     Response to Pain Interventions activity participation with tolerable pain  -KE       Row Name 11/12/24 1447          Plan of Care Review    Plan of Care Reviewed With patient;family  -KE     Outcome Evaluation PT eval complete. Pt presents with balance deficits and decreased functional endurance warranting skilled IP PT services. Pt amb 100' +100' w/ B UE support on monitor, CGA. PT anticipate home w/ assist at d/c, will continue to monitor status. Educated pt and family on importance of continued mobility, pt verbalizes understanding.  -KE       Row Name 11/12/24 1447          Therapy Assessment/Plan (PT)    Patient/Family Therapy Goals Statement (PT) to go home  -KE     Rehab Potential (PT) good  -KE     Criteria for Skilled Interventions Met (PT) yes;meets criteria;skilled treatment is necessary  -KE     Therapy Frequency (PT) daily  -KE     Predicted Duration of Therapy Intervention (PT) 5 days  -KE       Row Name 11/12/24 1447          Vital Signs    Pre Systolic BP Rehab 136  -KE     Pre Treatment Diastolic BP 88  -KE     Post Systolic BP Rehab 139  -KE     Post Treatment Diastolic BP 82  -KE     Pretreatment Heart Rate (beats/min) 120  -KE     Posttreatment Heart Rate (beats/min) 119  -KE     Pre SpO2 (%) 94  -KE     O2 Delivery Pre Treatment supplemental O2  -KE     Intra SpO2 (%) 92  -KE     O2 Delivery Intra Treatment room air  -KE     Post SpO2 (%) 96  -KE     O2 Delivery Post Treatment supplemental O2  -KE     Pre Patient Position Supine  -KE     Intra Patient Position Standing  -KE     Post  Patient Position Sitting  -       Row Name 11/12/24 1447          Positioning and Restraints    Pre-Treatment Position in bed  -KE     Post Treatment Position chair  -KE     In Chair notified nsg;reclined;waffle cushion;call light within reach;exit alarm on;encouraged to call for assist;legs elevated;with family/caregiver  -JENNIFER               User Key  (r) = Recorded By, (t) = Taken By, (c) = Cosigned By      Initials Name Provider Type    Shae Damon PT Physical Therapist                   Outcome Measures       Row Name 11/12/24 1451 11/12/24 0930       How much help from another person do you currently need...    Turning from your back to your side while in flat bed without using bedrails? 4  -KE 4  -TR    Moving from lying on back to sitting on the side of a flat bed without bedrails? 3  -KE 4  -TR    Moving to and from a bed to a chair (including a wheelchair)? 4  -KE 3  -TR    Standing up from a chair using your arms (e.g., wheelchair, bedside chair)? 4  -KE 4  -TR    Climbing 3-5 steps with a railing? 3  -KE 3  -TR    To walk in hospital room? 3  -KE 3  -TR    AM-PAC 6 Clicks Score (PT) 21  -KE 21  -TR    Highest Level of Mobility Goal 6 --> Walk 10 steps or more  -KE 6 --> Walk 10 steps or more  -WILLIAM      Row Name 11/12/24 1451          Functional Assessment    Outcome Measure Options AM-PAC 6 Clicks Basic Mobility (PT)  -JENNIFER               User Key  (r) = Recorded By, (t) = Taken By, (c) = Cosigned By      Initials Name Provider Type    Ivon Mcghee, RN Registered Nurse    Shae Damon, TAWANDA Physical Therapist                                 Physical Therapy Education       Title: PT OT SLP Therapies (In Progress)       Topic: Physical Therapy (In Progress)       Point: Mobility training (In Progress)       Learning Progress Summary            Patient Acceptance, E, NR by JENNIFER at 11/12/2024 1451                      Point: Home exercise program (In Progress)       Learning Progress Summary             Patient Acceptance, E, NR by  at 11/12/2024 1451                      Point: Body mechanics (In Progress)       Learning Progress Summary            Patient Acceptance, E, NR by  at 11/12/2024 1451                      Point: Precautions (In Progress)       Learning Progress Summary            Patient Acceptance, E, NR by  at 11/12/2024 1451                                      User Key       Initials Effective Dates Name Provider Type Discipline     11/16/23 -  Shae Molina, PT Physical Therapist PT                  PT Recommendation and Plan  Planned Therapy Interventions (PT): balance training, bed mobility training, gait training, home exercise program, neuromuscular re-education, postural re-education, transfer training, patient/family education, stretching, strengthening, stair training, ROM (range of motion)  Outcome Evaluation: PT eval complete. Pt presents with balance deficits and decreased functional endurance warranting skilled IP PT services. Pt amb 100' +100' w/ B UE support on monitor, CGA. PT anticipate home w/ assist at d/c, will continue to monitor status. Educated pt and family on importance of continued mobility, pt verbalizes understanding.     Time Calculation:   PT Evaluation Complexity  History, PT Evaluation Complexity: 1-2 personal factors and/or comorbidities  Examination of Body Systems (PT Eval Complexity): total of 3 or more elements  Clinical Presentation (PT Evaluation Complexity): stable  Clinical Decision Making (PT Evaluation Complexity): low complexity  Overall Complexity (PT Evaluation Complexity): low complexity     PT Charges       Row Name 11/12/24 1452             Time Calculation    Start Time 1306  -KE      PT Received On 11/12/24  -KE      PT Goal Re-Cert Due Date 11/22/24  -KE         Untimed Charges    PT Eval/Re-eval Minutes 50  -KE         Total Minutes    Untimed Charges Total Minutes 50  -KE       Total Minutes 50  -KE                User Key   (r) = Recorded By, (t) = Taken By, (c) = Cosigned By      Initials Name Provider Type    Shae Damon, PT Physical Therapist                  Therapy Charges for Today       Code Description Service Date Service Provider Modifiers Qty    [id number]  PT EVAL LOW COMPLEXITY 4 11/12/2024 Shae Molina, TAWANDA GP 1            PT G-Codes  Outcome Measure Options: AM-PAC 6 Clicks Basic Mobility (PT)  AM-PAC 6 Clicks Score (PT): 21  PT Discharge Summary  Anticipated Discharge Disposition (PT): home with assist    Shae Molina PT  11/12/2024

## 2024-11-12 NOTE — PROGRESS NOTES
"Critical Care Note     LOS: 4 days   Patient Care Team:  Eva Tovar MD as PCP - General (Internal Medicine)    Chief Complaint/Reason for visit:    Chief Complaint   Patient presents with    Flank Pain   Acute biliary obstruction  Status post ERCP  Status postcholecystectomy, November 10  Hypophosphatemia    Subjective       Interval History:     Patient received a dose of hydromorphone this morning and is lethargic.  Phosphorus is 1 and being replaced.  No nausea or vomiting.  She remains afebrile.  Resting heart rate is 105, improved compared to yesterday.  She is on 2 L with a saturation of 96%.  Urine output 725 mL, right upper quadrant drain 900 mL.    Review of Systems:    All systems were reviewed and negative except as noted in subjective.    Medical history, surgical history, social history, family history reviewed    Objective     Intake/Output:    Intake/Output Summary (Last 24 hours) at 11/12/2024 1042  Last data filed at 11/12/2024 0941  Gross per 24 hour   Intake 25195 ml   Output 1775 ml   Net 9405 ml       Nutrition:  Diet: Liquid; Clear Liquid; Fluid Consistency: Thin (IDDSI 0)    Infusions:  lactated ringers, 100 mL/hr, Last Rate: 100 mL/hr (11/12/24 1032)        Mechanical Ventilator Settings:                                                Telemetry: Sinus tachycardia             Vital Signs  Blood pressure (!) 148/102, pulse 105, temperature 98.6 °F (37 °C), temperature source Oral, resp. rate 24, height 157.5 cm (62\"), weight 84.4 kg (186 lb), last menstrual period 10/29/2024, SpO2 96%.    Physical Exam:  General Appearance:  Young woman in no acute distress, somnolent post pain and   Head:  Atraumatic   Eyes:          Conjunctiva pink, I do not appreciate jaundice   Ears:     Throat: Oral mucosa moist   Neck: Supple, no crepitus   Back:      Lungs:   Symmetric chest expansion.  Breath sounds are clear anteriorly, diminished at the bases    Heart:  Increased rate, regular, S1, S2 " "auscultated   Abdomen:   Laparoscopic incisions intact without bleeding.  Hypoactive bowel sounds, mildly tender, distended   Rectal:   Deferred   Extremities: Pretibial edema, nailbeds pink   Pulses:    Skin: Warm and dry   Lymph nodes:    Neurologic: Resting, somnolent post pain medicine.  Will arouse and follow simple commands      Results Review:     I reviewed the patient's new clinical results.   Results from last 7 days   Lab Units 11/12/24  0401 11/11/24  1239 11/11/24  0132   SODIUM mmol/L 134* 132* 135*   POTASSIUM mmol/L 3.6 4.1 4.3   CHLORIDE mmol/L 101 100 103   CO2 mmol/L 23.0 18.0* 23.0   BUN mg/dL 8 11 11   CREATININE mg/dL 0.51* 0.70 0.63   CALCIUM mg/dL 7.6* 7.7* 7.9*   BILIRUBIN mg/dL 1.7* 1.5* 1.7*   ALK PHOS U/L 73 137* 183*   ALT (SGPT) U/L 81* 136* 195*   AST (SGOT) U/L 24 30 39*   GLUCOSE mg/dL 116* 151* 155*     Results from last 7 days   Lab Units 11/12/24  0401 11/11/24  1239 11/11/24  0132   WBC 10*3/mm3 23.38* 33.78* 31.11*   HEMOGLOBIN g/dL 11.6* 15.8 16.3*   HEMATOCRIT % 35.0 48.9* 49.3*   PLATELETS 10*3/mm3 288 465* 483*         Lab Results   Component Value Date    BLOODCX No growth at 4 days 11/08/2024     No results found for: \"URINECX\"    I reviewed the patient's new imaging including images and reports.    CT ANGIOGRAM CHEST, CT ABD W CONTRAST PELVIS W WO CONTRAST    Date of Exam: 11/11/2024 12:46 PM EST    Indication: pleuritic chest pasin, tachycardia.    Comparison: CT abdomen and pelvis of 11/8/2024.    Technique: CTA of the chest was performed before and after the uneventful intravenous administration of 85 mL Isovue-370. Reconstructed coronal and sagittal images were also obtained. In addition, a 3-D volume rendered image was created for  interpretation. Automated exposure control and iterative reconstruction methods were used.      Findings:  CT chest: There are no filling defects suspicious for pulmonary emboli. There are no enlarged mediastinal nodes. There are no hilar " masses. There are small bilateral pleural effusions. There is compressive atelectasis in the lung bases. The heart size is   normal. There is no pericardial effusion.    CT abdomen and pelvis: The gallbladder has been removed. An oval nonenhancing fluid collection is identified in the gallbladder fossa measuring 6 x 2 cm. There is a surgical drain entering the right abdomen having its tip near the gallbladder fossa.  There is new moderately large amount of abdominal ascites and mild amount of pelvic ascites. There is peripancreatic soft tissue stranding and fluid. There is no evidence of a focal enhancing or encapsulated fluid collection. There is no bile duct  dilatation. There are no focal liver lesions. Spleen and adrenal glands appear normal. There are no renal lesions or hydronephrosis. Partially filled bladder appears grossly normal. There is no pelvic mass. There is no large or small bowel dilatation.  There is a small amount of free intraperitoneal air identified over the left hepatic lobe. The stomach is collapsed.   Impression:     Negative exam for pulmonary embolism. Small bilateral pleural effusions with mild compressive bibasilar atelectasis.    Findings compatible with acute pancreatitis. Moderate amount of abdominal and small amount of pelvic ascites. Small nonencapsulated fluid collection in the gallbladder fossa, status post cholecystectomy. Small amount of free air is compatible with recent   surgery. Surgical drain is in place.      Electronically Signed: Kristi Matta MD   11/11/2024 1:40 PM EST     All medications reviewed.   bisacodyl, 10 mg, Oral, Daily  docusate sodium, 100 mg, Oral, BID  FLUoxetine, 40 mg, Oral, Daily  heparin (porcine), 5,000 Units, Subcutaneous, Q8H  magnesium sulfate, 4 g, Intravenous, Once  metoclopramide, 10 mg, Intravenous, Q6H  mupirocin, 1 Application, Each Nare, BID  pantoprazole, 40 mg, Intravenous, Q AM  piperacillin-tazobactam, 3.375 g, Intravenous,  Q8H  potassium phosphate, 15 mmol, Intravenous, Q3H          Assessment & Plan       Biliary obstruction    Calculus of gallbladder and bile duct with obstruction    Mood disorder    Obesity (BMI 30-39.9)    Post-ERCP acute pancreatitis      23-year-old woman presenting on the evening of October 8 with biliary obstruction, jaundice and pain.  She underwent endoscopic retrograde cholangiogram without obstruction, biliary sphincterotomy and balloon sweep on November 9.  She underwent laparoscopic cholecystectomy on November 10.  She developed some post ERCP pancreatitis and was placed on fluids and antiemetics.  She moved to the ICU on November 12 with tachycardia and tachypnea.  Imaging revealed a new moderately large abdominal ascites, some basilar atelectasis and effusions.  White count increased to 33 and she developed metabolic acidosis.  Lipase yesterday was 1257.  Today lipase is improved at 607.  Renal function is normal.  White count improved to 23.  Phosphorus dropped to 1 and is being replaced.  Ionized calcium is also mildly reduced at 1.04.  Hemoglobin is decreased at 11.6 compared to 16.3 yesterday        PLAN:    Monitor LFTs, lipase, H&H  Monitor lactic acid, ionized calcium  Continue Zosyn  Hydration, decrease rate to 100 mL/h  Incentive spirometry  Clear liquid diet  Ambulate    VTE Prophylaxis: subcutaneous heparin    Stress Ulcer Prophylaxis: Protonix    Isabel Nice MD  11/12/24  10:42 EST      Time: Critical care 35 min  I personally provided care to this critically ill patient as documented above.  Critical care time does not include time spent on separately billed procedures.  None of my critical care time was concurrent with other critical care providers.

## 2024-11-12 NOTE — PROGRESS NOTES
"                  Clinical Nutrition     Patient Name: Eder Wills  YOB: 2000  MRN: [id number]  Date of Encounter: 11/12/24 16:01 EST  Admission date: 11/7/2024  Reason for Visit: MDR, Identified at risk by screening criteria, NPO/Clear liquid    Assessment   Nutrition Assessment   Admission Diagnosis:  Biliary obstruction [K83.1]    Problem List:    Biliary obstruction    Calculus of gallbladder and bile duct with obstruction    Mood disorder    Obesity (BMI 30-39.9)    Post-ERCP acute pancreatitis      PMH:   She  has a past medical history of Obesity (BMI 30-39.9) (11/11/2024).    PSH:  She  has a past surgical history that includes Tonsillectomy (2008); Adenoidectomy (2008); Cosmetic surgery; Cholecystectomy (N/A, 11/10/2024); and ERCP (N/A, 11/9/2024).    Applicable Nutrition History:     s/p ERCP 11-9-24    s/p cholecystectomy 11-10-24    Labs    Labs Reviewed: Yes    Results from last 7 days   Lab Units 11/12/24  0401 11/11/24  1632 11/11/24  1239 11/11/24  0132 11/08/24  0527 11/07/24  2240   GLUCOSE mg/dL 116*  --  151* 155*   < > 117*   BUN mg/dL 8  --  11 11   < > 9   CREATININE mg/dL 0.51*  --  0.70 0.63   < > 0.83   SODIUM mmol/L 134*  --  132* 135*   < > 143   CHLORIDE mmol/L 101  --  100 103   < > 108*   POTASSIUM mmol/L 3.6  --  4.1 4.3   < > 4.1   PHOSPHORUS mg/dL 1.0*  --   --   --   --   --    MAGNESIUM mg/dL 1.7  --   --  1.7  --   --    ALT (SGPT) U/L 81*  --  136* 195*   < > 519*   LACTATE mmol/L 1.0 1.9  --   --   --  1.1    < > = values in this interval not displayed.       Results from last 7 days   Lab Units 11/12/24  0401 11/11/24  1632 11/11/24  1239 11/11/24  0132 11/10/24  0522   ALBUMIN g/dL 3.4*  --  2.9* 3.2* 4.2   CRP mg/dL  --   --   --  13.18* 1.82*   IONIZED CALCIUM mmol/L 1.04* 1.01*  --   --   --        Results from last 7 days   Lab Units 11/11/24  1214   GLUCOSE mg/dL 140*     No results found for: \"HGBA1C\"            Medications    Medications Reviewed: " "yes    Scheduled Meds:bisacodyl, 10 mg, Oral, Daily  docusate sodium, 100 mg, Oral, BID  FLUoxetine, 40 mg, Oral, Daily  heparin (porcine), 5,000 Units, Subcutaneous, Q8H  metoclopramide, 10 mg, Intravenous, Q6H  mupirocin, 1 Application, Each Nare, BID  pantoprazole, 40 mg, Intravenous, Q AM  piperacillin-tazobactam, 3.375 g, Intravenous, Q8H  potassium phosphate, 15 mmol, Intravenous, Q3H      Continuous Infusions:lactated ringers, 100 mL/hr, Last Rate: 100 mL/hr (11/12/24 1032)      PRN Meds:.  acetaminophen    Calcium Replacement - Follow Nurse / BPA Driven Protocol    HYDROcodone-acetaminophen    HYDROmorphone **AND** naloxone    Magnesium Cardiology Dose Replacement - Follow Nurse / BPA Driven Protocol    Morphine    ondansetron ODT **OR** ondansetron    oxyCODONE-acetaminophen    Phosphorus Replacement - Follow Nurse / BPA Driven Protocol    Potassium Replacement - Follow Nurse / BPA Driven Protocol    promethazine    simethicone    [COMPLETED] Insert Peripheral IV **AND** sodium chloride    Intake/Ouptut 24 hrs (0701 - 0700)   I&O's Reviewed: yes    Intake & Output (last day)         11/11 0701 11/12 0700 11/12 0701 11/13 0700    P.O. 220 280    I.V. (mL/kg) 14969 (130.3) 1324.9 (15.7)    IV Piggyback  100.2    Total Intake(mL/kg) 40414 (132.9) 1705.1 (20.2)    Urine (mL/kg/hr) 725 (0.4) 375 (0.5)    Drains 900 350    Total Output 1625 725    Net +9595 +980.1                 Anthropometrics     Height: Height: 157.5 cm (62\")  Last Filed Weight: Weight: 84.4 kg (186 lb) (11/07/24 2147)  Method: Weight Method: Stated  BMI: BMI (Calculated): 34    UBW: ?  Weight change:  unknown     Weight       Weight (kg) Weight (lbs) Weight Method Visit Report   7/7/2016 66.225 kg  146 lb   --    8/5/2016 65.772 kg  145 lb  Stated     9/1/2016 71.442 kg  157 lb 8 oz   --    7/17/2017 79.606 kg  175 lb 8 oz      11/7/2024 84.369 kg  186 lb  Stated           Nutrition Focused Physical Exam     Date:     Unable to perform due " to Defer pending indication     Subjective   Reported/Observed/Food/Nutrition Related History:     Pt resting in bed, flat affect, reports she is still having abdomina pain, is drinking some clear liquids, was able to walk with PT earlier    + LR@100ml    Per RN: low grade temp last night, replacing all of her electrolytes    Current Nutrition Prescription     PO: Diet: Liquid; Clear Liquid; Fluid Consistency: Thin (IDDSI 0)  Oral Nutrition Supplement:  Intake: Insufficient data    Assessment & Plan   Nutrition Diagnosis   Date: 11-12-24 Updated:   Problem Inadequate oral intake    Etiology Gallstones, Acute Pancreatitis   Signs/Symptoms NPO/ Clears Liquids 3 days   Status: New    Goal:   Nutrition to support treatment and Tolerate PO      Nutrition Intervention      Follow treatment progress, Care plan reviewed, Advised available snacks, Interview for preferences, Encourage intake, Supplement provided    Pt NPO/CLEAR LIQUIDS 3 days now, suggest advance diet as soon as medically indicated    Will add Boost Breeze 3x/day    Monitoring/Evaluation:   Per protocol, I&O, PO intake, Pertinent labs, Weight, Skin status, GI status, Symptoms, Swallow function, Hemodynamic stability    Melissa Tolbert RD  Time Spent: 30min

## 2024-11-13 ENCOUNTER — TELEPHONE (OUTPATIENT)
Dept: GASTROENTEROLOGY | Facility: CLINIC | Age: 24
End: 2024-11-13
Payer: COMMERCIAL

## 2024-11-13 LAB
ALBUMIN SERPL-MCNC: 2.8 G/DL (ref 3.5–5.2)
ALBUMIN/GLOB SERPL: 1.3 G/DL
ALP SERPL-CCNC: 126 U/L (ref 39–117)
ALT SERPL W P-5'-P-CCNC: 71 U/L (ref 1–33)
ANION GAP SERPL CALCULATED.3IONS-SCNC: 9 MMOL/L (ref 5–15)
AST SERPL-CCNC: 24 U/L (ref 1–32)
BACTERIA SPEC AEROBE CULT: NORMAL
BACTERIA SPEC AEROBE CULT: NORMAL
BILIRUB SERPL-MCNC: 1.4 MG/DL (ref 0–1.2)
BUN SERPL-MCNC: 7 MG/DL (ref 6–20)
BUN/CREAT SERPL: 16.7 (ref 7–25)
CALCIUM SPEC-SCNC: 7.8 MG/DL (ref 8.6–10.5)
CHLORIDE SERPL-SCNC: 102 MMOL/L (ref 98–107)
CO2 SERPL-SCNC: 23 MMOL/L (ref 22–29)
CREAT SERPL-MCNC: 0.42 MG/DL (ref 0.57–1)
DEPRECATED RDW RBC AUTO: 47 FL (ref 37–54)
EGFRCR SERPLBLD CKD-EPI 2021: 142.9 ML/MIN/1.73
ERYTHROCYTE [DISTWIDTH] IN BLOOD BY AUTOMATED COUNT: 13.6 % (ref 12.3–15.4)
GLOBULIN UR ELPH-MCNC: 2.2 GM/DL
GLUCOSE SERPL-MCNC: 100 MG/DL (ref 65–99)
HCT VFR BLD AUTO: 33.2 % (ref 34–46.6)
HCT VFR BLD AUTO: 33.5 % (ref 34–46.6)
HGB BLD-MCNC: 10.8 G/DL (ref 12–15.9)
HGB BLD-MCNC: 11 G/DL (ref 12–15.9)
LIPASE SERPL-CCNC: 134 U/L (ref 13–60)
MAGNESIUM SERPL-MCNC: 2.1 MG/DL (ref 1.6–2.6)
MCH RBC QN AUTO: 30.3 PG (ref 26.6–33)
MCHC RBC AUTO-ENTMCNC: 32.5 G/DL (ref 31.5–35.7)
MCV RBC AUTO: 93.3 FL (ref 79–97)
PHOSPHATE SERPL-MCNC: 1.6 MG/DL (ref 2.5–4.5)
PHOSPHATE SERPL-MCNC: 1.7 MG/DL (ref 2.5–4.5)
PHOSPHATE SERPL-MCNC: 2.4 MG/DL (ref 2.5–4.5)
PLATELET # BLD AUTO: 288 10*3/MM3 (ref 140–450)
PMV BLD AUTO: 10.4 FL (ref 6–12)
POTASSIUM SERPL-SCNC: 3.9 MMOL/L (ref 3.5–5.2)
PROT SERPL-MCNC: 5 G/DL (ref 6–8.5)
QT INTERVAL: 302 MS
QT INTERVAL: 302 MS
QTC INTERVAL: 432 MS
QTC INTERVAL: 447 MS
RBC # BLD AUTO: 3.56 10*6/MM3 (ref 3.77–5.28)
SODIUM SERPL-SCNC: 134 MMOL/L (ref 136–145)
WBC NRBC COR # BLD AUTO: 18 10*3/MM3 (ref 3.4–10.8)

## 2024-11-13 PROCEDURE — 85018 HEMOGLOBIN: CPT | Performed by: INTERNAL MEDICINE

## 2024-11-13 PROCEDURE — 84100 ASSAY OF PHOSPHORUS: CPT | Performed by: INTERNAL MEDICINE

## 2024-11-13 PROCEDURE — 25010000002 HEPARIN (PORCINE) PER 1000 UNITS: Performed by: INTERNAL MEDICINE

## 2024-11-13 PROCEDURE — 25810000003 LACTATED RINGERS PER 1000 ML: Performed by: INTERNAL MEDICINE

## 2024-11-13 PROCEDURE — 99232 SBSQ HOSP IP/OBS MODERATE 35: CPT | Performed by: INTERNAL MEDICINE

## 2024-11-13 PROCEDURE — 85027 COMPLETE CBC AUTOMATED: CPT | Performed by: INTERNAL MEDICINE

## 2024-11-13 PROCEDURE — 25010000002 PIPERACILLIN SOD-TAZOBACTAM PER 1 G: Performed by: INTERNAL MEDICINE

## 2024-11-13 PROCEDURE — 25010000002 METOCLOPRAMIDE PER 10 MG: Performed by: SURGERY

## 2024-11-13 PROCEDURE — 85014 HEMATOCRIT: CPT | Performed by: INTERNAL MEDICINE

## 2024-11-13 PROCEDURE — 25010000002 HEPARIN (PORCINE) PER 1000 UNITS: Performed by: SURGERY

## 2024-11-13 PROCEDURE — 25810000003 SODIUM CHLORIDE 0.9 % SOLUTION 250 ML FLEX CONT: Performed by: NURSE PRACTITIONER

## 2024-11-13 PROCEDURE — 80053 COMPREHEN METABOLIC PANEL: CPT | Performed by: INTERNAL MEDICINE

## 2024-11-13 PROCEDURE — 83690 ASSAY OF LIPASE: CPT | Performed by: INTERNAL MEDICINE

## 2024-11-13 PROCEDURE — 83735 ASSAY OF MAGNESIUM: CPT | Performed by: INTERNAL MEDICINE

## 2024-11-13 RX ORDER — SODIUM CHLORIDE, SODIUM LACTATE, POTASSIUM CHLORIDE, CALCIUM CHLORIDE 600; 310; 30; 20 MG/100ML; MG/100ML; MG/100ML; MG/100ML
75 INJECTION, SOLUTION INTRAVENOUS CONTINUOUS
Status: DISCONTINUED | OUTPATIENT
Start: 2024-11-13 | End: 2024-11-14

## 2024-11-13 RX ORDER — METOPROLOL TARTRATE 25 MG/1
25 TABLET, FILM COATED ORAL EVERY 12 HOURS SCHEDULED
Status: DISCONTINUED | OUTPATIENT
Start: 2024-11-13 | End: 2024-11-15 | Stop reason: HOSPADM

## 2024-11-13 RX ORDER — DEXMEDETOMIDINE HYDROCHLORIDE 4 UG/ML
.2-1.5 INJECTION, SOLUTION INTRAVENOUS
Status: DISCONTINUED | OUTPATIENT
Start: 2024-11-13 | End: 2024-11-13

## 2024-11-13 RX ORDER — TRAZODONE HYDROCHLORIDE 50 MG/1
50 TABLET, FILM COATED ORAL NIGHTLY
Status: DISCONTINUED | OUTPATIENT
Start: 2024-11-13 | End: 2024-11-15 | Stop reason: HOSPADM

## 2024-11-13 RX ORDER — PANTOPRAZOLE SODIUM 40 MG/1
40 TABLET, DELAYED RELEASE ORAL
Status: DISCONTINUED | OUTPATIENT
Start: 2024-11-14 | End: 2024-11-15 | Stop reason: HOSPADM

## 2024-11-13 RX ORDER — DIAZEPAM 5 MG/1
5 TABLET ORAL EVERY 6 HOURS PRN
Status: DISCONTINUED | OUTPATIENT
Start: 2024-11-13 | End: 2024-11-15 | Stop reason: HOSPADM

## 2024-11-13 RX ADMIN — PANTOPRAZOLE SODIUM 40 MG: 40 INJECTION, POWDER, FOR SOLUTION INTRAVENOUS at 05:45

## 2024-11-13 RX ADMIN — METOCLOPRAMIDE 10 MG: 5 INJECTION, SOLUTION INTRAMUSCULAR; INTRAVENOUS at 04:00

## 2024-11-13 RX ADMIN — HEPARIN SODIUM 5000 UNITS: 5000 INJECTION INTRAVENOUS; SUBCUTANEOUS at 15:11

## 2024-11-13 RX ADMIN — METOPROLOL TARTRATE 25 MG: 25 TABLET, FILM COATED ORAL at 09:32

## 2024-11-13 RX ADMIN — HEPARIN SODIUM 5000 UNITS: 5000 INJECTION INTRAVENOUS; SUBCUTANEOUS at 21:20

## 2024-11-13 RX ADMIN — DIAZEPAM 5 MG: 5 TABLET ORAL at 09:32

## 2024-11-13 RX ADMIN — ACETAMINOPHEN 650 MG: 325 TABLET ORAL at 21:24

## 2024-11-13 RX ADMIN — METOCLOPRAMIDE 10 MG: 5 INJECTION, SOLUTION INTRAMUSCULAR; INTRAVENOUS at 08:49

## 2024-11-13 RX ADMIN — SODIUM CHLORIDE, SODIUM LACTATE, POTASSIUM CHLORIDE, CALCIUM CHLORIDE 75 ML/HR: 20; 30; 600; 310 INJECTION, SOLUTION INTRAVENOUS at 16:52

## 2024-11-13 RX ADMIN — SODIUM PHOSPHATE, MONOBASIC, MONOHYDRATE AND SODIUM PHOSPHATE, DIBASIC, ANHYDROUS 15 MMOL: 276; 142 INJECTION, SOLUTION INTRAVENOUS at 02:31

## 2024-11-13 RX ADMIN — POTASSIUM & SODIUM PHOSPHATES POWDER PACK 280-160-250 MG 2 PACKET: 280-160-250 PACK at 18:45

## 2024-11-13 RX ADMIN — SODIUM PHOSPHATE, MONOBASIC, MONOHYDRATE AND SODIUM PHOSPHATE, DIBASIC, ANHYDROUS 15 MMOL: 276; 142 INJECTION, SOLUTION INTRAVENOUS at 05:35

## 2024-11-13 RX ADMIN — HEPARIN SODIUM 5000 UNITS: 5000 INJECTION INTRAVENOUS; SUBCUTANEOUS at 05:45

## 2024-11-13 RX ADMIN — MUPIROCIN 1 APPLICATION: 20 OINTMENT TOPICAL at 21:22

## 2024-11-13 RX ADMIN — PIPERACILLIN AND TAZOBACTAM 3.38 G: 3; .375 INJECTION, POWDER, LYOPHILIZED, FOR SOLUTION INTRAVENOUS at 23:54

## 2024-11-13 RX ADMIN — HYDROCODONE BITARTRATE AND ACETAMINOPHEN 1 TABLET: 5; 325 TABLET ORAL at 01:49

## 2024-11-13 RX ADMIN — MUPIROCIN 1 APPLICATION: 20 OINTMENT TOPICAL at 08:49

## 2024-11-13 RX ADMIN — FLUOXETINE HYDROCHLORIDE 40 MG: 20 CAPSULE ORAL at 08:49

## 2024-11-13 RX ADMIN — METOPROLOL TARTRATE 25 MG: 25 TABLET, FILM COATED ORAL at 21:20

## 2024-11-13 RX ADMIN — PIPERACILLIN AND TAZOBACTAM 3.38 G: 3; .375 INJECTION, POWDER, LYOPHILIZED, FOR SOLUTION INTRAVENOUS at 08:33

## 2024-11-13 RX ADMIN — PIPERACILLIN AND TAZOBACTAM 3.38 G: 3; .375 INJECTION, POWDER, LYOPHILIZED, FOR SOLUTION INTRAVENOUS at 16:51

## 2024-11-13 RX ADMIN — TRAZODONE HYDROCHLORIDE 50 MG: 50 TABLET ORAL at 21:20

## 2024-11-13 NOTE — PROGRESS NOTES
"GI Daily Progress Note  Subjective:    Chief Complaint:  f/u pancreatitis    Patient reports feeling a little better today.  She is trying to get up to chair and walk frequently.  Reports she is tolerating clear liquids and would like to try and advance.     Objective:    /86   Pulse 94   Temp 98.1 °F (36.7 °C) (Oral)   Resp (!) 30   Ht 157.5 cm (62\")   Wt 84.4 kg (186 lb)   LMP 10/29/2024 (Approximate)   SpO2 100%   BMI 34.02 kg/m²     Physical Exam  Physical Exam:   General: Patient awake, sitting up at bedside   Eyes: Normal lids and lashes, no scleral icterus   Neck: Supple, normal ROM   Skin: Warm and dry, not jaundiced   Cardiovascular: Regular rate, well-perfused extremities   Pulm: Equal expansion bilaterally, no increased WOB   Abdomen: nondistended; drain in place    Lab  Lab Results   Component Value Date    WBC 18.00 (H) 11/13/2024    HGB 10.8 (L) 11/13/2024    HGB 11.0 (L) 11/13/2024    HGB 11.0 (L) 11/12/2024    MCV 93.3 11/13/2024     11/13/2024    INR 1.02 11/10/2024       Lab Results   Component Value Date    GLUCOSE 100 (H) 11/13/2024    BUN 7 11/13/2024    CREATININE 0.42 (L) 11/13/2024    BCR 16.7 11/13/2024     (L) 11/13/2024    K 3.9 11/13/2024    CO2 23.0 11/13/2024    CALCIUM 7.8 (L) 11/13/2024    ALBUMIN 2.8 (L) 11/13/2024    ALKPHOS 126 (H) 11/13/2024    BILITOT 1.4 (H) 11/13/2024    ALT 71 (H) 11/13/2024    AST 24 11/13/2024       Assessment:  Choledocholithiasis and Cholelithiasis  Elevated LFTS and Tbili  Post-ERCP Pancreatitis (ERCP with biliary sphincterotomy and balloon sweep 11/9)  SIRS 2/2 Post-ERCP Pancreatitis    Plan:  - LFTs/tbili improved  - CT chest, abdomen and pelvis 11/11 with findings of acute pancreatitis and resultatn ascites.  RUQ surgical drain in place.  Bilateral pleural effusions.   - RUQ drain draining ascites from pancreatitis, output decreasing  - Continue pain control and anti-emetics as needed  - Bowel regimen titration as needed, had " Bms today  - Remains on broad antibiotics, I have low suspicion for infected pancreatitis at this time and suspect her SIRS is secondary severe inflammation from pancreatitis   - IV LR  - Labs overall improving, continue to monitor electrolytes and replace PRN  - Continue to encourage incentive spirometry  - Continue supportive care in the ICU  - Will advance diet to full liquid this evening, supplement with boost     Dick Patrick MD  11/13/24  16:12 EST

## 2024-11-13 NOTE — CASE MANAGEMENT/SOCIAL WORK
Continued Stay Note  [facility]     Patient Name: Eder Wills  MRN: [id number]  Today's Date: 11/13/2024    Admit Date: 11/7/2024    Plan: HOme   Discharge Plan       Row Name 11/13/24 1407       Plan    Plan HOme    Patient/Family in Agreement with Plan yes    Plan Comments I met w/patient father @ bedside. Patient was asleep, did not disturb, due to her not having  sleep last night. Her dad stated that d/c plan is home w/parents, they will transport. No d/c  needs verbalized @ this time. CM will continue to follow.    Final Discharge Disposition Code 01 - home or self-care                   Discharge Codes    No documentation.                 Expected Discharge Date and Time       Expected Discharge Date Expected Discharge Time    Nov 14, 2024               Justus Jean RN

## 2024-11-13 NOTE — PLAN OF CARE
Goal Outcome Evaluation:      Pt continues to be very anxious and impulsive. Frequently jumping out of bed. RR 20s-40s. Provider aware.   Started metoprolol and valium for increased HR and anxiety. No significant improvement.  Urinary frequency and urgency. Up to bedside commode multiple times. Only 100 mL UOP.   X5 liquid/loose BM.  PRN melatonin and trazodone ordered for PM to aid sleep.   Ambulated x2 about 100 ft each w/ gait belt. Gait unsteady. Increased fatigue.  Family updated at bedside.   Transferring to telemetry.

## 2024-11-13 NOTE — PROGRESS NOTES
"Critical Care Note     LOS: 5 days   Patient Care Team:  Eva Tovar MD as PCP - General (Internal Medicine)    Chief Complaint/Reason for visit:    Chief Complaint   Patient presents with    Flank Pain   Acute biliary obstruction  Status post ERCP  Status postcholecystectomy, November 10  Hypophosphatemia    Subjective       Interval History:     She is afebrile today.  She has been extremely anxious and this is caused her to be tachypneic restless.  She has pulled her IVs out.  She is afebrile with a Tmax of 99.6.  On 2 L her saturation is 98%.  Right upper quadrant drain output 725 mL yesterday, urine output 975 mL.  She has been ambulating.    Review of Systems:    All systems were reviewed and negative except as noted in subjective.    Medical history, surgical history, social history, family history reviewed    Objective     Intake/Output:    Intake/Output Summary (Last 24 hours) at 11/13/2024 1342  Last data filed at 11/13/2024 0900  Gross per 24 hour   Intake 3241 ml   Output 1025 ml   Net 2216 ml       Nutrition:  Diet: Liquid; Clear Liquid; Fluid Consistency: Thin (IDDSI 0)    Infusions:         Mechanical Ventilator Settings:                                                Telemetry: Sinus tachycardia             Vital Signs  Blood pressure 132/86, pulse 100, temperature 98.7 °F (37.1 °C), temperature source Axillary, resp. rate (!) 40, height 157.5 cm (62\"), weight 84.4 kg (186 lb), last menstrual period 10/29/2024, SpO2 98%.    Physical Exam:  General Appearance:  Young woman sitting upright in the chair, anxious breathing 40 breaths a minute.  Heart rate was initially 130 244.  Heart rate improved with metoprolol.   Head:  Atraumatic   Eyes:          Conjunctiva pink, I do not appreciate jaundice   Ears:     Throat: Oral mucosa moist   Neck: Supple, no crepitus   Back:      Lungs:   Symmetric chest expansion.  Breath sounds are clear anteriorly, diminished at the bases posteriorly    Heart: " " Increased rate, regular, S1, S2 auscultated   Abdomen:   Laparoscopic incisions intact without bleeding.  Hypoactive bowel sounds, mildly tender, distended   Rectal:   Deferred   Extremities: Pretibial edema, nailbeds pink.  Right forearm IV site without erythema but the right hand is swollen   Pulses:    Skin: Warm and dry   Lymph nodes:    Neurologic: Patient is awake and oriented.  She is anxious and restless.  No hallucinations      Results Review:     I reviewed the patient's new clinical results.   Results from last 7 days   Lab Units 11/13/24  0610 11/12/24  1808 11/12/24  0401 11/11/24  1239   SODIUM mmol/L 134* 136 134* 132*   POTASSIUM mmol/L 3.9 4.2 3.6 4.1   CHLORIDE mmol/L 102 104 101 100   CO2 mmol/L 23.0 24.0 23.0 18.0*   BUN mg/dL 7 7 8 11   CREATININE mg/dL 0.42* 0.43* 0.51* 0.70   CALCIUM mg/dL 7.8* 7.6* 7.6* 7.7*   BILIRUBIN mg/dL 1.4*  --  1.7* 1.5*   ALK PHOS U/L 126*  --  73 137*   ALT (SGPT) U/L 71*  --  81* 136*   AST (SGOT) U/L 24  --  24 30   GLUCOSE mg/dL 100* 130* 116* 151*     Results from last 7 days   Lab Units 11/13/24  0610 11/13/24  0033 11/12/24  1808 11/12/24  1210 11/12/24  0401 11/11/24  1239   WBC 10*3/mm3 18.00*  --   --   --  23.38* 33.78*   HEMOGLOBIN g/dL 10.8* 11.0* 11.0*   < > 11.6* 15.8   HEMATOCRIT % 33.2* 33.5* 33.3*   < > 35.0 48.9*   PLATELETS 10*3/mm3 288  --   --   --  288 465*    < > = values in this interval not displayed.         Lab Results   Component Value Date    BLOODCX No growth at 24 hours 11/11/2024     No results found for: \"URINECX\"    I reviewed the patient's new imaging including images and reports.    CT ANGIOGRAM CHEST, CT ABD W CONTRAST PELVIS W WO CONTRAST    Date of Exam: 11/11/2024 12:46 PM EST    Indication: pleuritic chest pasin, tachycardia.    Comparison: CT abdomen and pelvis of 11/8/2024.    Technique: CTA of the chest was performed before and after the uneventful intravenous administration of 85 mL Isovue-370. Reconstructed coronal and " sagittal images were also obtained. In addition, a 3-D volume rendered image was created for  interpretation. Automated exposure control and iterative reconstruction methods were used.      Findings:  CT chest: There are no filling defects suspicious for pulmonary emboli. There are no enlarged mediastinal nodes. There are no hilar masses. There are small bilateral pleural effusions. There is compressive atelectasis in the lung bases. The heart size is   normal. There is no pericardial effusion.    CT abdomen and pelvis: The gallbladder has been removed. An oval nonenhancing fluid collection is identified in the gallbladder fossa measuring 6 x 2 cm. There is a surgical drain entering the right abdomen having its tip near the gallbladder fossa.  There is new moderately large amount of abdominal ascites and mild amount of pelvic ascites. There is peripancreatic soft tissue stranding and fluid. There is no evidence of a focal enhancing or encapsulated fluid collection. There is no bile duct  dilatation. There are no focal liver lesions. Spleen and adrenal glands appear normal. There are no renal lesions or hydronephrosis. Partially filled bladder appears grossly normal. There is no pelvic mass. There is no large or small bowel dilatation.  There is a small amount of free intraperitoneal air identified over the left hepatic lobe. The stomach is collapsed.   Impression:     Negative exam for pulmonary embolism. Small bilateral pleural effusions with mild compressive bibasilar atelectasis.    Findings compatible with acute pancreatitis. Moderate amount of abdominal and small amount of pelvic ascites. Small nonencapsulated fluid collection in the gallbladder fossa, status post cholecystectomy. Small amount of free air is compatible with recent   surgery. Surgical drain is in place.      Electronically Signed: Kristi Matta MD   11/11/2024 1:40 PM EST     All medications reviewed.   bisacodyl, 10 mg, Oral, Daily  docusate  sodium, 100 mg, Oral, BID  FLUoxetine, 40 mg, Oral, Daily  heparin (porcine), 5,000 Units, Subcutaneous, Q8H  metoprolol tartrate, 25 mg, Oral, Q12H  mupirocin, 1 Application, Each Nare, BID  [START ON 11/14/2024] pantoprazole, 40 mg, Oral, Q AM  piperacillin-tazobactam, 3.375 g, Intravenous, Q8H  traZODone, 50 mg, Oral, Nightly          Assessment & Plan       Biliary obstruction    Calculus of gallbladder and bile duct with obstruction    Mood disorder    Obesity (BMI 30-39.9)    Post-ERCP acute pancreatitis      23-year-old woman presenting on the evening of October 8 with biliary obstruction, jaundice and pain.  She underwent endoscopic retrograde cholangiogram without obstruction, biliary sphincterotomy and balloon sweep on November 9.  She underwent laparoscopic cholecystectomy on November 10.  She developed some post ERCP pancreatitis and was placed on fluids and antiemetics.  She moved to the ICU on November 12 with tachycardia and tachypnea.  Imaging revealed a new moderately large abdominal ascites, some basilar atelectasis and effusions.  White count increased to 33 and she developed metabolic acidosis.  Lipase has improved to 137.  White blood cell count is 18, continuing to improve.  Magnesium and phosphorus have been replaced.  Hemoglobin has continued to drift slightly and is 10.8.  Yesterday hemoglobin was 11.6 and on admission hemoglobin was 16.3.  She is extremely anxious and this causes tachypnea.  She does have some pleural effusions as well as ascites.  Her home dose of Prozac has been restarted.  She received 1 dose of Valium without significant improvement.  She has been eating poorly and has not been sleeping well.    PLAN:    Monitor LFTs, lipase, H&H    Continue Zosyn    Stop IV fluids and monitor output  Replace electrolytes    Incentive spirometry  Ambulate    Clear liquid diet, advance as tolerated    Continue metoprolol for tachycardia, this may also help her anxiety  Continue home dose  of Prozac  Use Valium every 6 hours as needed    Telemetry    VTE Prophylaxis: subcutaneous heparin    Stress Ulcer Prophylaxis: Protonix    Isabel Nice MD  11/13/24  13:42 EST      Time: Critical care 35 min  I personally provided care to this critically ill patient as documented above.  Critical care time does not include time spent on separately billed procedures.  None of my critical care time was concurrent with other critical care providers.

## 2024-11-13 NOTE — PLAN OF CARE
Goal Outcome Evaluation:   VSS. Patient anxious overnight, consistently calling out for assistance. Patient has urinary frequency, but denies any burning or pain, patient also on menstrual cycle. Patient on 2L. Walked patient down the rocha and back x1 before bed. Patient restless throughout entire shift. Family at bedside, updated throughout care. Decreased drainage in BRUNO drain.

## 2024-11-13 NOTE — TELEPHONE ENCOUNTER
Hub staff attempted to follow warm transfer process and was unsuccessful     Caller: CARTER ALVES- FATHER    Relationship to patient: Parent    Best call back number: [phone]    Patient is needing: PT FATHER CALLING NEEDING TO SPEAK WITH DR. BAKER . PT HAD SURGERY ON 11/9 ASKED FOR A CALL BACK TODAY

## 2024-11-13 NOTE — PROGRESS NOTES
"Patient Name:  Eder Wills  YOB: 2000  [id number]    Surgery Progress Note    Date of visit: 11/13/2024      Subjective: Clinically stable.  Tells me that she feels better overall.  Had most of a protein shake yesterday.  Did pass flatus.  Denies nausea or vomiting.  Still feels bloated but this also feels slightly better          Objective:     BP (!) 146/101 (BP Location: Left arm, Patient Position: Lying)   Pulse 101   Temp 99 °F (37.2 °C) (Oral)   Resp (!) 36   Ht 157.5 cm (62\")   Wt 84.4 kg (186 lb)   LMP 10/29/2024 (Approximate)   SpO2 100%   BMI 34.02 kg/m²     Intake/Output Summary (Last 24 hours) at 11/13/2024 0829  Last data filed at 11/13/2024 0600  Gross per 24 hour   Intake 4079.1 ml   Output 1650 ml   Net 2429.1 ml       GEN:   Awake, alert, in no acute distress, appears uncomfortable but in no distress  CV:   Regular rate and rhythm  L:  Symmetric expansion, not labored on oxygen by nasal cannula  Abd:  Soft, mild to moderately distended throughout, appropriately tender to palpation along incisions, incisions are clean, dry, and intact, BRUNO in the right upper quadrant with some slightly cloudy ascites fluid  Ext:  No cyanosis, clubbing, or edema    Recent labs that are back at this time have been reviewed.           Assessment/ Plan:    Mrs. Wills is a 23-year-old lady without significant past medical history with choledocholithiasis      # Choledocholithiasis  # Pancreatitis  -Status post ERCP on November 9  -Postop day 3 following laparoscopic cholecystectomy.  -White count down at 18.  Hemoglobin is essentially stable at 10.8 from 11.6 yesterday.  Liver function tests downtrending.  Lipase down  -Continue BRUNO drain and monitor output  -Continue supportive management of pancreatitis.  Can advance diet as tolerated from my standpoint      Raheem Raza MD  11/13/2024  08:29 EST      "

## 2024-11-14 LAB
ALBUMIN SERPL-MCNC: 2.7 G/DL (ref 3.5–5.2)
ALBUMIN/GLOB SERPL: 1.3 G/DL
ALP SERPL-CCNC: 135 U/L (ref 39–117)
ALT SERPL W P-5'-P-CCNC: 54 U/L (ref 1–33)
ANION GAP SERPL CALCULATED.3IONS-SCNC: 11 MMOL/L (ref 5–15)
AST SERPL-CCNC: 29 U/L (ref 1–32)
BASOPHILS # BLD AUTO: 0.03 10*3/MM3 (ref 0–0.2)
BASOPHILS NFR BLD AUTO: 0.2 % (ref 0–1.5)
BILIRUB SERPL-MCNC: 1.1 MG/DL (ref 0–1.2)
BUN SERPL-MCNC: 7 MG/DL (ref 6–20)
BUN/CREAT SERPL: 17.1 (ref 7–25)
CALCIUM SPEC-SCNC: 7.3 MG/DL (ref 8.6–10.5)
CHLORIDE SERPL-SCNC: 104 MMOL/L (ref 98–107)
CO2 SERPL-SCNC: 23 MMOL/L (ref 22–29)
CREAT SERPL-MCNC: 0.41 MG/DL (ref 0.57–1)
DEPRECATED RDW RBC AUTO: 48.3 FL (ref 37–54)
EGFRCR SERPLBLD CKD-EPI 2021: 143.8 ML/MIN/1.73
EOSINOPHIL # BLD AUTO: 0.07 10*3/MM3 (ref 0–0.4)
EOSINOPHIL NFR BLD AUTO: 0.4 % (ref 0.3–6.2)
ERYTHROCYTE [DISTWIDTH] IN BLOOD BY AUTOMATED COUNT: 14.2 % (ref 12.3–15.4)
GLOBULIN UR ELPH-MCNC: 2.1 GM/DL
GLUCOSE SERPL-MCNC: 102 MG/DL (ref 65–99)
HCT VFR BLD AUTO: 29.1 % (ref 34–46.6)
HGB BLD-MCNC: 9.6 G/DL (ref 12–15.9)
IMM GRANULOCYTES # BLD AUTO: 0.14 10*3/MM3 (ref 0–0.05)
IMM GRANULOCYTES NFR BLD AUTO: 0.9 % (ref 0–0.5)
LIPASE SERPL-CCNC: 39 U/L (ref 13–60)
LYMPHOCYTES # BLD AUTO: 1.49 10*3/MM3 (ref 0.7–3.1)
LYMPHOCYTES NFR BLD AUTO: 9.4 % (ref 19.6–45.3)
MAGNESIUM SERPL-MCNC: 1.9 MG/DL (ref 1.6–2.6)
MAGNESIUM SERPL-MCNC: 2.4 MG/DL (ref 1.6–2.6)
MCH RBC QN AUTO: 30.4 PG (ref 26.6–33)
MCHC RBC AUTO-ENTMCNC: 33 G/DL (ref 31.5–35.7)
MCV RBC AUTO: 92.1 FL (ref 79–97)
MONOCYTES # BLD AUTO: 1.33 10*3/MM3 (ref 0.1–0.9)
MONOCYTES NFR BLD AUTO: 8.4 % (ref 5–12)
NEUTROPHILS NFR BLD AUTO: 12.75 10*3/MM3 (ref 1.7–7)
NEUTROPHILS NFR BLD AUTO: 80.7 % (ref 42.7–76)
NRBC BLD AUTO-RTO: 0 /100 WBC (ref 0–0.2)
PHOSPHATE SERPL-MCNC: 1.7 MG/DL (ref 2.5–4.5)
PHOSPHATE SERPL-MCNC: 1.9 MG/DL (ref 2.5–4.5)
PLAT MORPH BLD: NORMAL
PLATELET # BLD AUTO: 319 10*3/MM3 (ref 140–450)
PMV BLD AUTO: 10 FL (ref 6–12)
POTASSIUM SERPL-SCNC: 3.5 MMOL/L (ref 3.5–5.2)
POTASSIUM SERPL-SCNC: 4.8 MMOL/L (ref 3.5–5.2)
PROT SERPL-MCNC: 4.8 G/DL (ref 6–8.5)
RBC # BLD AUTO: 3.16 10*6/MM3 (ref 3.77–5.28)
RBC MORPH BLD: NORMAL
SODIUM SERPL-SCNC: 138 MMOL/L (ref 136–145)
WBC MORPH BLD: NORMAL
WBC NRBC COR # BLD AUTO: 15.81 10*3/MM3 (ref 3.4–10.8)

## 2024-11-14 PROCEDURE — 83735 ASSAY OF MAGNESIUM: CPT | Performed by: INTERNAL MEDICINE

## 2024-11-14 PROCEDURE — 85025 COMPLETE CBC W/AUTO DIFF WBC: CPT | Performed by: INTERNAL MEDICINE

## 2024-11-14 PROCEDURE — 99232 SBSQ HOSP IP/OBS MODERATE 35: CPT | Performed by: STUDENT IN AN ORGANIZED HEALTH CARE EDUCATION/TRAINING PROGRAM

## 2024-11-14 PROCEDURE — 83690 ASSAY OF LIPASE: CPT | Performed by: INTERNAL MEDICINE

## 2024-11-14 PROCEDURE — 97530 THERAPEUTIC ACTIVITIES: CPT

## 2024-11-14 PROCEDURE — 84132 ASSAY OF SERUM POTASSIUM: CPT | Performed by: INTERNAL MEDICINE

## 2024-11-14 PROCEDURE — 25010000002 HEPARIN (PORCINE) PER 1000 UNITS: Performed by: INTERNAL MEDICINE

## 2024-11-14 PROCEDURE — 84100 ASSAY OF PHOSPHORUS: CPT | Performed by: INTERNAL MEDICINE

## 2024-11-14 PROCEDURE — 99232 SBSQ HOSP IP/OBS MODERATE 35: CPT | Performed by: PHYSICIAN ASSISTANT

## 2024-11-14 PROCEDURE — 25810000003 SODIUM CHLORIDE 0.9 % SOLUTION 250 ML FLEX CONT: Performed by: STUDENT IN AN ORGANIZED HEALTH CARE EDUCATION/TRAINING PROGRAM

## 2024-11-14 PROCEDURE — 25010000002 PIPERACILLIN SOD-TAZOBACTAM PER 1 G: Performed by: INTERNAL MEDICINE

## 2024-11-14 PROCEDURE — 25810000003 LACTATED RINGERS PER 1000 ML: Performed by: INTERNAL MEDICINE

## 2024-11-14 PROCEDURE — 85007 BL SMEAR W/DIFF WBC COUNT: CPT | Performed by: INTERNAL MEDICINE

## 2024-11-14 PROCEDURE — 80053 COMPREHEN METABOLIC PANEL: CPT | Performed by: INTERNAL MEDICINE

## 2024-11-14 PROCEDURE — 25010000002 MAGNESIUM SULFATE 4 GM/100ML SOLUTION: Performed by: INTERNAL MEDICINE

## 2024-11-14 RX ORDER — POTASSIUM CHLORIDE 1500 MG/1
40 TABLET, EXTENDED RELEASE ORAL EVERY 4 HOURS
Status: COMPLETED | OUTPATIENT
Start: 2024-11-14 | End: 2024-11-14

## 2024-11-14 RX ORDER — MAGNESIUM SULFATE HEPTAHYDRATE 40 MG/ML
4 INJECTION, SOLUTION INTRAVENOUS ONCE
Status: COMPLETED | OUTPATIENT
Start: 2024-11-14 | End: 2024-11-14

## 2024-11-14 RX ADMIN — POTASSIUM & SODIUM PHOSPHATES POWDER PACK 280-160-250 MG 2 PACKET: 280-160-250 PACK at 05:48

## 2024-11-14 RX ADMIN — ACETAMINOPHEN 650 MG: 325 TABLET ORAL at 20:29

## 2024-11-14 RX ADMIN — MUPIROCIN 1 APPLICATION: 20 OINTMENT TOPICAL at 08:27

## 2024-11-14 RX ADMIN — ACETAMINOPHEN 650 MG: 325 TABLET ORAL at 02:56

## 2024-11-14 RX ADMIN — METOPROLOL TARTRATE 25 MG: 25 TABLET, FILM COATED ORAL at 08:27

## 2024-11-14 RX ADMIN — HEPARIN SODIUM 5000 UNITS: 5000 INJECTION INTRAVENOUS; SUBCUTANEOUS at 20:29

## 2024-11-14 RX ADMIN — TRAZODONE HYDROCHLORIDE 50 MG: 50 TABLET ORAL at 20:29

## 2024-11-14 RX ADMIN — METOPROLOL TARTRATE 25 MG: 25 TABLET, FILM COATED ORAL at 20:29

## 2024-11-14 RX ADMIN — POTASSIUM CHLORIDE 40 MEQ: 1500 TABLET, EXTENDED RELEASE ORAL at 11:08

## 2024-11-14 RX ADMIN — Medication 10 ML: at 20:30

## 2024-11-14 RX ADMIN — POTASSIUM CHLORIDE 40 MEQ: 1500 TABLET, EXTENDED RELEASE ORAL at 05:49

## 2024-11-14 RX ADMIN — PANTOPRAZOLE SODIUM 40 MG: 40 TABLET, DELAYED RELEASE ORAL at 05:56

## 2024-11-14 RX ADMIN — SODIUM PHOSPHATE, MONOBASIC, MONOHYDRATE AND SODIUM PHOSPHATE, DIBASIC, ANHYDROUS 15 MMOL: 276; 142 INJECTION, SOLUTION INTRAVENOUS at 15:17

## 2024-11-14 RX ADMIN — Medication 5 MG: at 20:29

## 2024-11-14 RX ADMIN — MAGNESIUM SULFATE IN WATER FOR 4 G: 40 INJECTION INTRAVENOUS at 05:48

## 2024-11-14 RX ADMIN — ACETAMINOPHEN 650 MG: 325 TABLET ORAL at 11:08

## 2024-11-14 RX ADMIN — HEPARIN SODIUM 5000 UNITS: 5000 INJECTION INTRAVENOUS; SUBCUTANEOUS at 05:49

## 2024-11-14 RX ADMIN — FLUOXETINE HYDROCHLORIDE 40 MG: 20 CAPSULE ORAL at 08:27

## 2024-11-14 RX ADMIN — PIPERACILLIN AND TAZOBACTAM 3.38 G: 3; .375 INJECTION, POWDER, LYOPHILIZED, FOR SOLUTION INTRAVENOUS at 08:26

## 2024-11-14 RX ADMIN — PIPERACILLIN AND TAZOBACTAM 3.38 G: 3; .375 INJECTION, POWDER, LYOPHILIZED, FOR SOLUTION INTRAVENOUS at 15:04

## 2024-11-14 RX ADMIN — SODIUM CHLORIDE, SODIUM LACTATE, POTASSIUM CHLORIDE, CALCIUM CHLORIDE 75 ML/HR: 20; 30; 600; 310 INJECTION, SOLUTION INTRAVENOUS at 05:48

## 2024-11-14 RX ADMIN — HEPARIN SODIUM 5000 UNITS: 5000 INJECTION INTRAVENOUS; SUBCUTANEOUS at 15:04

## 2024-11-14 NOTE — PROGRESS NOTES
"Patient Name:  Eder Wills  YOB: 2000  [id number]    Surgery Progress Note    Date of visit: 11/14/2024      Subjective: No acute events.  Moved out of the ICU yesterday afternoon.  Feeling much better and sitting up in the bed without any obvious complaints.  Reports she slept well last night.  Still has some bloating but this is markedly improved as compared to the past several days.  Tolerated full liquid diet yesterday without any difficulty.  No nausea or vomiting.  Has had multiple loose bowel movements.  Had a Tmax of 100.6          Objective:     /87 (BP Location: Right arm, Patient Position: Lying)   Pulse 101   Temp 99 °F (37.2 °C) (Oral)   Resp 18   Ht 157.5 cm (62\")   Wt 84.4 kg (186 lb)   LMP 10/29/2024 (Approximate)   SpO2 92%   BMI 34.02 kg/m²     Intake/Output Summary (Last 24 hours) at 11/14/2024 0704  Last data filed at 11/14/2024 0253  Gross per 24 hour   Intake 1187 ml   Output 715 ml   Net 472 ml       GEN:   Awake, alert, in no acute distress, resting comfortably in bed   CV:   Regular rate and rhythm  L:  Symmetric expansion, not labored on room air  Abd:  Soft, appropriately tender to palpation along incisions, incisions are clean, dry, and intact, not obviously distended, BRUNO drain in the right upper quadrant with straw-colored serous ascites fluid  Ext:  No cyanosis, clubbing, or edema    Recent labs that are back at this time have been reviewed.           Assessment/ Plan:    Mrs. Wills is a 23-year-old lady without significant past medical history with choledocholithiasis      # Choledocholithiasis  # Pancreatitis  -Status post ERCP 11/9/24  -Status post Lap Pamela 11/10/24  -Labs continue to improve.  White blood cell count is 15.  Liver function test not elevated.  Hypophosphatemia noted  -Continue BRUNO drain and monitor output.  615 mL of straw-colored ascites fluid yesterday  -Advance diet as tolerated from my standpoint  -Incentive spirometer.  Encourage " mobilization  -Will discontinue bowel regimen given multiple loose stools      Raheem Raza MD  11/14/2024  07:04 EST

## 2024-11-14 NOTE — THERAPY TREATMENT NOTE
Patient Name: Eder Wills  : 2000    MRN: [id number]                              Today's Date: 2024       Admit Date: 2024    Visit Dx:     ICD-10-CM ICD-9-CM   1. Calculus of gallbladder and bile duct with obstruction without cholecystitis  K80.71 574.91   2. Jaundice  R17 782.4   3. Cholecystitis  K81.9 575.10     Patient Active Problem List   Diagnosis    Calculus of gallbladder and bile duct with obstruction    Biliary obstruction    Mood disorder    Obesity (BMI 30-39.9)    Post-ERCP acute pancreatitis     Past Medical History:   Diagnosis Date    Obesity (BMI 30-39.9) 2024     Past Surgical History:   Procedure Laterality Date    ADENOIDECTOMY      CHOLECYSTECTOMY N/A 11/10/2024    Procedure: CHOLECYSTECTOMY LAPAROSCOPIC;  Surgeon: Raheem Raza MD;  Location: [facility] OR;  Service: General;  Laterality: N/A;    COSMETIC SURGERY          ERCP N/A 2024    Procedure: ENDOSCOPIC RETROGRADE CHOLANGIOPANCREATOGRAPHY;  Surgeon: Dick Patrick MD;  Location: [facility] ENDOSCOPY;  Service: Gastroenterology;  Laterality: N/A;    TONSILLECTOMY        General Information       Row Name 24 1345          Physical Therapy Time and Intention    Document Type therapy note (daily note)  -CK     Mode of Treatment physical therapy;individual therapy  -CK       Row Name 24 1345          General Information    Patient Profile Reviewed yes  -CK     Existing Precautions/Restrictions fall  BRUNO  -CK     Barriers to Rehab none identified  -CK       Row Name 24 1344          Cognition    Orientation Status (Cognition) oriented x 3  -CK       Row Name 24 1341          Safety Issues/Impairments Affecting Functional Mobility    Safety Issues Affecting Function (Mobility) awareness of need for assistance;impulsivity;insight into deficits/self-awareness;positioning of assistive device;safety precaution awareness  -CK     Impairments Affecting Function (Mobility)  balance;endurance/activity tolerance  -CK               User Key  (r) = Recorded By, (t) = Taken By, (c) = Cosigned By      Initials Name Provider Type    CK Rut Reagan PT Physical Therapist                   Mobility       Row Name 11/14/24 1345          Bed Mobility    Comment, (Bed Mobility) sitting EOB/UIC pre/post treatment  -CK       Row Name 11/14/24 1345          Sit-Stand Transfer    Sit-Stand [place] (Transfers) standby assist  -CK     Assistive Device (Sit-Stand Transfers) walker, front-wheeled  -CK     Comment, (Sit-Stand Transfer) stood from EOB with forearms on FWW, adjusted walker to appropriate height and provided cues for upright posture  -CK       Row Name 11/14/24 1343          Gait/Stairs (Locomotion)    [place] Level (Gait) standby assist  -CK     Assistive Device (Gait) walker, front-wheeled  -CK     Patient was able to Ambulate yes  -CK     Distance in Feet (Gait) 180  -CK     Deviations/Abnormal Patterns (Gait) bilateral deviations;gait speed decreased  -CK     Bilateral Gait Deviations forward flexed posture  -CK     Comment, (Gait/Stairs) Flexed posture. Cues for upright posture and patient asks if she can place forearms on walker. Encouraged upright posture to promote healing in upright positioning. Discussed use of AD for increased distances as patient fatigues quickly. Recommend stair training next session  -CK               User Key  (r) = Recorded By, (t) = Taken By, (c) = Cosigned By      Initials Name Provider Type    Rut Caro PT Physical Therapist                   Obj/Interventions       Row Name 11/14/24 1675          Balance    Balance Assessment sitting static balance;standing static balance;standing dynamic balance  -CK     Static Sitting Balance independent  -CK     Dynamic Sitting Balance independent  -CK     Position, Sitting Balance unsupported;sitting edge of bed;sitting in chair  -CK     Static Standing Balance supervision  -CK     Dynamic  Standing Balance standby assist  -CK     Position/Device Used, Standing Balance supported;walker, front-wheeled  -CK               User Key  (r) = Recorded By, (t) = Taken By, (c) = Cosigned By      Initials Name Provider Type    Rut Caro PT Physical Therapist                   Goals/Plan    No documentation.                  Clinical Impression       Row Name 11/14/24 1347          Pain    Pretreatment Pain Rating 0/10 - no pain  -CK     Posttreatment Pain Rating 0/10 - no pain  -CK       Row Name 11/14/24 1347          Plan of Care Review    Plan of Care Reviewed With patient;family  -CK     Progress improving  -CK     Outcome Evaluation Patient able to progress ambulation distance to 180' SBA with FWW, cues for optimal posturing within walker and also at rest as she heals. IPPT remains indicated to address deficits in endurance. Continue to recommend D/C home with assist when medically appropriate. Recommend trialing stairs next session.  -CK       Row Name 11/14/24 1347          Vital Signs    Intratreatment Heart Rate (beats/min) 111  -CK     O2 Delivery Pre Treatment room air  -CK     O2 Delivery Intra Treatment room air  -CK     O2 Delivery Post Treatment room air  -CK     Pre Patient Position Sitting  -CK     Intra Patient Position Standing  -CK     Post Patient Position Sitting  -CK       Row Name 11/14/24 1347          Positioning and Restraints    Pre-Treatment Position in bed  -CK     Post Treatment Position chair  -CK     In Chair sitting;call light within reach;with family/caregiver;notified nsg  -CK               User Key  (r) = Recorded By, (t) = Taken By, (c) = Cosigned By      Initials Name Provider Type    Rut Caro PT Physical Therapist                   Outcome Measures       Row Name 11/14/24 1349 11/14/24 0800       How much help from another person do you currently need...    Turning from your back to your side while in flat bed without using bedrails? 4  -CK 4  -JM     Moving from lying on back to sitting on the side of a flat bed without bedrails? 4  -CK 4  -JM    Moving to and from a bed to a chair (including a wheelchair)? 4  -CK 4  -JM    Standing up from a chair using your arms (e.g., wheelchair, bedside chair)? 4  -CK 4  -JM    Climbing 3-5 steps with a railing? 3  -CK 4  -JM    To walk in hospital room? 4  -CK 4  -JM    AM-PAC 6 Clicks Score (PT) 23  -CK 24  -JM    Highest Level of Mobility Goal 7 --> Walk 25 feet or more  -CK 8 --> Walked 250 feet or more  -      Row Name 11/14/24 0600          How much help from another person do you currently need...    Turning from your back to your side while in flat bed without using bedrails? 4  -AS     Moving from lying on back to sitting on the side of a flat bed without bedrails? 4  -AS     Moving to and from a bed to a chair (including a wheelchair)? 4  -AS     Standing up from a chair using your arms (e.g., wheelchair, bedside chair)? 4  -AS     Climbing 3-5 steps with a railing? 3  -AS     To walk in hospital room? 3  -AS     AM-PAC 6 Clicks Score (PT) 22  -AS     Highest Level of Mobility Goal 7 --> Walk 25 feet or more  -AS       Row Name 11/14/24 1349          Functional Assessment    Outcome Measure Options AM-PAC 6 Clicks Basic Mobility (PT)  -               User Key  (r) = Recorded By, (t) = Taken By, (c) = Cosigned By      Initials Name Provider Type    CK Rut Reagan, PT Physical Therapist    Madelaine Hernández, RN Registered Nurse    AS Elbert Beauchamp, RN Registered Nurse                                 Physical Therapy Education       Title: PT OT SLP Therapies (Done)       Topic: Physical Therapy (Done)       Point: Mobility training (Done)       Learning Progress Summary            Patient Acceptance, E, VU by ALLEN at 11/14/2024 1349    Acceptance, E, NR by JENNIFER at 11/12/2024 1451   Family Acceptance, E, VU by ALLEN at 11/14/2024 1349                      Point: Home exercise program (Done)        Learning Progress Summary            Patient Acceptance, E, VU by CK at 11/14/2024 1349    Acceptance, E, NR by KE at 11/12/2024 1451   Family Acceptance, E, VU by CK at 11/14/2024 1349                      Point: Body mechanics (Done)       Learning Progress Summary            Patient Acceptance, E, VU by CK at 11/14/2024 1349    Acceptance, E, NR by KE at 11/12/2024 1451   Family Acceptance, E, VU by CK at 11/14/2024 1349                      Point: Precautions (Done)       Learning Progress Summary            Patient Acceptance, E, VU by CK at 11/14/2024 1349    Acceptance, E, NR by KE at 11/12/2024 1451   Family Acceptance, E, VU by CK at 11/14/2024 1349                                      User Key       Initials Effective Dates Name Provider Type Discipline    CK 02/06/24 -  Rut Reagan, PT Physical Therapist PT    JENNIFER 11/16/23 -  Shae Molina PT Physical Therapist PT                  PT Recommendation and Plan     Progress: improving  Outcome Evaluation: Patient able to progress ambulation distance to 180' SBA with FWW, cues for optimal posturing within walker and also at rest as she heals. IPPT remains indicated to address deficits in endurance. Continue to recommend D/C home with assist when medically appropriate. Recommend trialing stairs next session.     Time Calculation:         PT Charges       Row Name 11/14/24 1349             Time Calculation    Start Time 1327  -CK      PT Received On 11/14/24  -CK         Timed Charges    41125 - PT Therapeutic Activity Minutes 13  -CK         Total Minutes    Timed Charges Total Minutes 13  -CK       Total Minutes 13  -CK                User Key  (r) = Recorded By, (t) = Taken By, (c) = Cosigned By      Initials Name Provider Type    CK Rut Reagan, PT Physical Therapist                  Therapy Charges for Today       Code Description Service Date Service Provider Modifiers Qty    12722004579 HC PT THERAPEUTIC ACT EA 15 MIN 11/14/2024 Merary  Rut, PT GP 1            PT G-Codes  Outcome Measure Options: AM-PAC 6 Clicks Basic Mobility (PT)  AM-PAC 6 Clicks Score (PT): 23  PT Discharge Summary  Anticipated Discharge Disposition (PT): home with assist    Rut Reagan, PT  11/14/2024

## 2024-11-14 NOTE — PLAN OF CARE
Problem: Adult Inpatient Plan of Care  Goal: Plan of Care Review  Outcome: Progressing  Flowsheets  Taken 11/14/2024 0627 by Elbert Beauchamp RN  Progress: improving  Taken 11/12/2024 1447 by Shae Molina PT  Plan of Care Reviewed With:   patient   family  Goal: Patient-Specific Goal (Individualized)  Outcome: Progressing  Goal: Absence of Hospital-Acquired Illness or Injury  Outcome: Progressing  Intervention: Identify and Manage Fall Risk  Recent Flowsheet Documentation  Taken 11/14/2024 0600 by Elbert Beauchamp RN  Safety Promotion/Fall Prevention:   safety round/check completed   nonskid shoes/slippers when out of bed   clutter free environment maintained   assistive device/personal items within reach  Taken 11/14/2024 0400 by Elbert Beauchamp RN  Safety Promotion/Fall Prevention:   safety round/check completed   nonskid shoes/slippers when out of bed   clutter free environment maintained   assistive device/personal items within reach  Taken 11/14/2024 0200 by Elbert Beauchamp RN  Safety Promotion/Fall Prevention:   safety round/check completed   nonskid shoes/slippers when out of bed   clutter free environment maintained   assistive device/personal items within reach  Taken 11/14/2024 0000 by Elbert Beauchamp RN  Safety Promotion/Fall Prevention:   safety round/check completed   nonskid shoes/slippers when out of bed   clutter free environment maintained   assistive device/personal items within reach  Taken 11/13/2024 2200 by Elbert Beauchamp RN  Safety Promotion/Fall Prevention: safety round/check completed  Taken 11/13/2024 2000 by Elbert Beauchamp RN  Safety Promotion/Fall Prevention:   safety round/check completed   nonskid shoes/slippers when out of bed   clutter free environment maintained   assistive device/personal items within reach  Intervention: Prevent Skin Injury  Recent Flowsheet Documentation  Taken 11/14/2024 0600 by Elbert Beauchamp RN  Body Position: position  changed independently  Taken 11/14/2024 0400 by Elbert Beauchamp RN  Body Position: position changed independently  Taken 11/14/2024 0200 by Elbert Beauchamp RN  Body Position: position changed independently  Intervention: Prevent and Manage VTE (Venous Thromboembolism) Risk  Recent Flowsheet Documentation  Taken 11/13/2024 2000 by Elbert Beauchamp RN  VTE Prevention/Management: (pharm) other (see comments)  Intervention: Prevent Infection  Recent Flowsheet Documentation  Taken 11/14/2024 0600 by Elbert Beauchamp RN  Infection Prevention: environmental surveillance performed  Taken 11/14/2024 0400 by Elbert Beauchamp RN  Infection Prevention: environmental surveillance performed  Taken 11/14/2024 0200 by Elbert Beauchamp RN  Infection Prevention: environmental surveillance performed  Taken 11/14/2024 0000 by Elbert Beauchamp RN  Infection Prevention: environmental surveillance performed  Taken 11/13/2024 2200 by Elbert Beauchamp RN  Infection Prevention: environmental surveillance performed  Taken 11/13/2024 2000 by Elbert Beauchamp RN  Infection Prevention: environmental surveillance performed  Goal: Optimal Comfort and Wellbeing  Outcome: Progressing  Intervention: Monitor Pain and Promote Comfort  Recent Flowsheet Documentation  Taken 11/13/2024 2124 by Elbert Beauchamp RN  Pain Management Interventions: pain medication given  Goal: Readiness for Transition of Care  Outcome: Progressing   Goal Outcome Evaluation:           Progress: improving

## 2024-11-14 NOTE — PLAN OF CARE
Goal Outcome Evaluation:  Plan of Care Reviewed With: patient, family        Progress: improving  Outcome Evaluation: Patient able to progress ambulation distance to 180' SBA with FWW, cues for optimal posturing within walker and also at rest as she heals. IPPT remains indicated to address deficits in endurance. Continue to recommend D/C home with assist when medically appropriate. Recommend trialing stairs next session.    Anticipated Discharge Disposition (PT): home with assist

## 2024-11-14 NOTE — PROGRESS NOTES
[facility] Medicine Services  PROGRESS NOTE    Patient Name: Eder Wills  : 2000  MRN: [id number]    Date of Admission: 2024  Primary Care Physician: Eva Tovar MD    Subjective   Subjective     CC:  RUQ pain    HPI:  Patient seen resting comfortably in bed no acute distress.  Patient denies any abdominal pain at this time.  Tolerating p.o. intake currently.  No acute complaints concerns at this time.      Objective   Objective     Vital Signs:   Temp:  [97.7 °F (36.5 °C)-100.6 °F (38.1 °C)] 99 °F (37.2 °C)  Heart Rate:  [] 92  Resp:  [16-18] 18  BP: (119-134)/(74-92) 134/92  Flow (L/min) (Oxygen Therapy):  [2] 2     Physical Exam  Constitutional:       General: She is not in acute distress.  Eyes:      General: No scleral icterus.     Extraocular Movements: Extraocular movements intact.   Cardiovascular:      Rate and Rhythm: Normal rate.      Pulses: Normal pulses.   Pulmonary:      Effort: Pulmonary effort is normal. No respiratory distress.   Abdominal:      General: There is no distension.      Palpations: Abdomen is soft.      Tenderness: There is no abdominal tenderness. There is no guarding or rebound.      Comments: BRUNO drain in place   Musculoskeletal:      Right lower leg: No edema.      Left lower leg: No edema.   Skin:     General: Skin is warm.   Neurological:      Mental Status: She is alert and oriented to person, place, and time.   Psychiatric:         Mood and Affect: Mood normal.         Behavior: Behavior normal.           Results Reviewed:  LAB RESULTS:      Lab 24  0357 24  0610 24  0033 24  1808 24  1210 24  0401 24  1632 24  1239 24  0132 11/10/24  0522 24  0527 24  2240   WBC 15.81* 18.00*  --   --   --  23.38*  --  33.78* 31.11* 18.12*   < > 9.78   HEMOGLOBIN 9.6* 10.8* 11.0* 11.0* 11.8* 11.6*  --  15.8 16.3* 15.8   < > 12.4   HEMATOCRIT 29.1* 33.2* 33.5* 33.3*  35.4 35.0  --  48.9* 49.3* 47.6*   < > 38.0   PLATELETS 319 288  --   --   --  288  --  465* 483* 450   < > 337   NEUTROS ABS 12.75*  --   --   --   --  20.48*  --  29.97* 27.19* 15.84*   < > 7.46*   IMMATURE GRANS (ABS) 0.14*  --   --   --   --  0.18*  --  0.30* 0.18* 0.10*   < > 0.03   LYMPHS ABS 1.49  --   --   --   --  1.21  --  1.22 1.22 0.76   < > 1.46   MONOS ABS 1.33*  --   --   --   --  1.48*  --  2.21* 2.44* 1.39*   < > 0.65   EOS ABS 0.07  --   --   --   --  0.00  --  0.00 0.00 0.00   < > 0.14   MCV 92.1 93.3  --   --   --  90.9  --  94.0 91.8 91.5   < > 92.5   CRP  --   --   --   --   --   --   --   --  13.18* 1.82*  --   --    PROCALCITONIN  --   --   --   --   --   --   --   --   --  0.04  --  0.12   LACTATE  --   --   --   --   --  1.0 1.9  --   --   --   --  1.1   LDH  --   --   --   --   --   --   --  366*  --   --   --   --    PROTIME  --   --   --   --   --   --   --   --   --  13.5  --   --     < > = values in this interval not displayed.         Lab 11/14/24  1358 11/14/24  0855 11/14/24  0357 11/13/24  1647 11/13/24  0610 11/13/24  0032 11/12/24  1808 11/12/24  0401 11/11/24  1632 11/11/24  1239 11/11/24  0132   SODIUM  --   --  138  --  134*  --  136 134*  --  132*  --    POTASSIUM 4.8  --  3.5  --  3.9  --  4.2 3.6  --  4.1  --    CHLORIDE  --   --  104  --  102  --  104 101  --  100  --    CO2  --   --  23.0  --  23.0  --  24.0 23.0  --  18.0*  --    ANION GAP  --   --  11.0  --  9.0  --  8.0 10.0  --  14.0  --    BUN  --   --  7  --  7  --  7 8  --  11  --    CREATININE  --   --  0.41*  --  0.42*  --  0.43* 0.51*  --  0.70  --    EGFR  --   --  143.8  --  142.9  --  142.1 136.4  --  126.4  --    GLUCOSE  --   --  102*  --  100*  --  130* 116*  --  151*  --    CALCIUM  --   --  7.3*  --  7.8*  --  7.6* 7.6*  --  7.7*  --    IONIZED CALCIUM  --   --   --   --   --   --   --  1.04* 1.01*  --   --    MAGNESIUM  --  2.4 1.9  --  2.1  --  2.6 1.7  --   --   --    PHOSPHORUS 1.7*  --  1.9* 1.7*  2.4* 1.6* 2.0* 1.0*  --   --    < >    < > = values in this interval not displayed.         Lab 11/14/24  0357 11/13/24  0610 11/12/24  0401 11/11/24  1239 11/11/24  0132 11/10/24  0522   TOTAL PROTEIN 4.8* 5.0* 4.6* 4.7* 5.6* 6.4   ALBUMIN 2.7* 2.8* 3.4* 2.9* 3.2* 4.2   GLOBULIN 2.1 2.2 1.2 1.8 2.4 2.2   ALT (SGPT) 54* 71* 81* 136* 195* 351*   AST (SGOT) 29 24 24 30 39* 86*   BILIRUBIN 1.1 1.4* 1.7* 1.5* 1.7* 2.4*   ALK PHOS 135* 126* 73 137* 183* 294*   LIPASE 39 134* 607*  --  1,257* 2,820*         Lab 11/10/24  0522   PROTIME 13.5   INR 1.02                 Brief Urine Lab Results  (Last result in the past 365 days)        Color   Clarity   Blood   Leuk Est   Nitrite   Protein   CREAT   Urine HCG        11/08/24 0113 Yellow   Clear   Negative   Negative   Negative   Negative                   Microbiology Results Abnormal       None            No radiology results from the last 24 hrs        Current medications:  Scheduled Meds:FLUoxetine, 40 mg, Oral, Daily  heparin (porcine), 5,000 Units, Subcutaneous, Q8H  metoprolol tartrate, 25 mg, Oral, Q12H  mupirocin, 1 Application, Each Nare, BID  pantoprazole, 40 mg, Oral, Q AM  piperacillin-tazobactam, 3.375 g, Intravenous, Q8H  sodium phosphate, 15 mmol, Intravenous, Once  traZODone, 50 mg, Oral, Nightly      Continuous Infusions:   PRN Meds:.  acetaminophen    Calcium Replacement - Follow Nurse / BPA Driven Protocol    diazePAM    HYDROcodone-acetaminophen    Magnesium Cardiology Dose Replacement - Follow Nurse / BPA Driven Protocol    melatonin    [DISCONTINUED] HYDROmorphone **AND** naloxone    ondansetron ODT **OR** ondansetron    oxyCODONE-acetaminophen    Phosphorus Replacement - Follow Nurse / BPA Driven Protocol    Potassium Replacement - Follow Nurse / BPA Driven Protocol    promethazine    simethicone    [COMPLETED] Insert Peripheral IV **AND** sodium chloride    Assessment & Plan   Assessment & Plan     Active Hospital Problems    Diagnosis  POA     **Biliary obstruction [K83.1]  Yes    Obesity (BMI 30-39.9) [E66.9]  Yes    Post-ERCP acute pancreatitis [K91.89, K85.90]  No    Calculus of gallbladder and bile duct with obstruction [K80.71]  Yes    Mood disorder [F39]  Yes      Resolved Hospital Problems   No resolved problems to display.        Brief Hospital Course to date:  Eder Wills is a 23 y.o. female with PMHx of anxiety who presented with right upper quadrant pain.  Patient found of biliary obstruction, status post ERCP, biliary sphincterotomy, balloon sweep on 11/9.  Status post laparoscopic cholecystectomy on 11/10.  Hospital admission complicated by ERCP induced pancreatitis.  Status post IV fluids and tolerating p.o. intake.  Currently completing course of empiric IV Zosyn.    RUQ pain  Biliary obstruction  -CT abdomen pelvis with cholelithiasis with mild stranding around gallbladder along with mild intra and extra hepatic biliary duct dilatation  -MRCP with findings suggestive of acute cholecystitis and choledocholithiasis  -CT abdomen pelvis obtained after ERCP consistent with acute pericarditis  -Status post ERCP, biliary sphincterotomy and balloon sweep on 11/9/2024  -Status post laparoscopic cholecystectomy on 11/10/2024.  BRUNO drain in placed during procedure  -Complicated by ERCP induced pancreatitis, status post IV fluids and tolerating p.o. intake  -Downgraded from ICU on 11/13  -Started on empiric antibiotics, Zosyn.  Plan to complete empiric 5-day course.  -Advancing diet as tolerated   -Monitor BRUNO drain output  -Surgery and GI following     Tachypnea  Tachycardia  -CTA chest negative for PE, small bilateral pleural effusions with compressive atelectasis  -Continue metoprolol for now     Anxiety  - Continue home prozac    Expected Discharge Location and Transportation: Home  Expected Discharge   Expected Discharge Date: 11/16/2024; Expected Discharge Time:      VTE Prophylaxis:  Pharmacologic & mechanical VTE prophylaxis orders are  present.         AM-PAC 6 Clicks Score (PT): 23 (11/14/24 5919)    CODE STATUS:   Code Status and Medical Interventions: CPR (Attempt to Resuscitate); Full Support   Ordered at: 11/08/24 0236     Level Of Support Discussed With:    Patient     Code Status (Patient has no pulse and is not breathing):    CPR (Attempt to Resuscitate)     Medical Interventions (Patient has pulse or is breathing):    Full Support       Yash Henderson DO  11/14/24

## 2024-11-14 NOTE — PROGRESS NOTES
"GI Daily Progress Note  Subjective:    Chief Complaint:  Follow up pancreatitis     Feeling a little better.  Tolerated full liquid diet.   Ambulating more.   Using incentive spirometry.    On 2L O2 via nasal cannula.   Inquires about possible discharge tomorrow as [birth year] will be her birthday.       Tmax 100.6 last night     Objective:    /92 (BP Location: Right arm, Patient Position: Sitting)   Pulse 92   Temp 99 °F (37.2 °C) (Oral)   Resp 18   Ht 157.5 cm (62\")   Wt 84.4 kg (186 lb)   LMP 10/29/2024 (Approximate)   SpO2 100%   BMI 34.02 kg/m²     Physical Exam  Constitutional:       Comments: Sitting up in the chair in no acute distress    Cardiovascular:      Rate and Rhythm: Regular rhythm. Tachycardia present.   Pulmonary:      Effort: Pulmonary effort is normal.   Abdominal:      General: There is no distension.      Comments: BRUNO drain in place    Neurological:      Mental Status: She is oriented to person, place, and time.       Lab  Lab Results   Component Value Date    WBC 15.81 (H) 11/14/2024    HGB 9.6 (L) 11/14/2024    HGB 10.8 (L) 11/13/2024    HGB 11.0 (L) 11/13/2024    MCV 92.1 11/14/2024     11/14/2024    INR 1.02 11/10/2024     Lab Results   Component Value Date    GLUCOSE 102 (H) 11/14/2024    BUN 7 11/14/2024    CREATININE 0.41 (L) 11/14/2024    BCR 17.1 11/14/2024     11/14/2024    K 3.5 11/14/2024    CO2 23.0 11/14/2024    CALCIUM 7.3 (L) 11/14/2024    ALBUMIN 2.7 (L) 11/14/2024    ALKPHOS 135 (H) 11/14/2024    BILITOT 1.1 11/14/2024    ALT 54 (H) 11/14/2024    AST 29 11/14/2024       Assessment:    Choledocholithiasis with biliary obstruction s/p ERCP on 11/9/24 with sphincterotomy and balloon sweep.     Post ERCP pancreatitis   Cholelithiasis with chronic cholecystitis s/p cholecystectomy on 11/10/24. POD # 4   SIRS secondary to pancreatitis, improving   Pleural effusions and ascites      Plan:    >> Advance diet to bland/gi soft  >> Discontinue IV LR  >> Incentive " spirometry     ROXY Taylor  11/14/24  12:56 EST

## 2024-11-15 ENCOUNTER — READMISSION MANAGEMENT (OUTPATIENT)
Dept: CALL CENTER | Facility: HOSPITAL | Age: 24
End: 2024-11-15
Payer: COMMERCIAL

## 2024-11-15 VITALS
OXYGEN SATURATION: 99 % | HEIGHT: 62 IN | TEMPERATURE: 98.6 F | HEART RATE: 91 BPM | DIASTOLIC BLOOD PRESSURE: 75 MMHG | SYSTOLIC BLOOD PRESSURE: 114 MMHG | BODY MASS INDEX: 34.23 KG/M2 | WEIGHT: 186 LBS | RESPIRATION RATE: 15 BRPM

## 2024-11-15 PROBLEM — K80.71 CALCULUS OF GALLBLADDER AND BILE DUCT WITH OBSTRUCTION: Status: RESOLVED | Noted: 2024-11-08 | Resolved: 2024-11-15

## 2024-11-15 PROBLEM — K85.90 POST-ERCP ACUTE PANCREATITIS: Status: RESOLVED | Noted: 2024-11-11 | Resolved: 2024-11-15

## 2024-11-15 PROBLEM — K83.1 BILIARY OBSTRUCTION: Status: RESOLVED | Noted: 2024-11-08 | Resolved: 2024-11-15

## 2024-11-15 PROBLEM — F39 MOOD DISORDER: Chronic | Status: RESOLVED | Noted: 2024-11-08 | Resolved: 2024-11-15

## 2024-11-15 PROBLEM — K91.89 POST-ERCP ACUTE PANCREATITIS: Status: RESOLVED | Noted: 2024-11-11 | Resolved: 2024-11-15

## 2024-11-15 LAB
ANION GAP SERPL CALCULATED.3IONS-SCNC: 11 MMOL/L (ref 5–15)
BASOPHILS # BLD AUTO: 0.05 10*3/MM3 (ref 0–0.2)
BASOPHILS NFR BLD AUTO: 0.3 % (ref 0–1.5)
BUN SERPL-MCNC: 7 MG/DL (ref 6–20)
BUN/CREAT SERPL: 20 (ref 7–25)
CALCIUM SPEC-SCNC: 7.9 MG/DL (ref 8.6–10.5)
CHLORIDE SERPL-SCNC: 105 MMOL/L (ref 98–107)
CO2 SERPL-SCNC: 22 MMOL/L (ref 22–29)
CREAT SERPL-MCNC: 0.35 MG/DL (ref 0.57–1)
DEPRECATED RDW RBC AUTO: 50.4 FL (ref 37–54)
EGFRCR SERPLBLD CKD-EPI 2021: 148.4 ML/MIN/1.73
EOSINOPHIL # BLD AUTO: 0.16 10*3/MM3 (ref 0–0.4)
EOSINOPHIL NFR BLD AUTO: 0.9 % (ref 0.3–6.2)
ERYTHROCYTE [DISTWIDTH] IN BLOOD BY AUTOMATED COUNT: 14.6 % (ref 12.3–15.4)
GLUCOSE SERPL-MCNC: 97 MG/DL (ref 65–99)
HCT VFR BLD AUTO: 32.6 % (ref 34–46.6)
HGB BLD-MCNC: 10.5 G/DL (ref 12–15.9)
IMM GRANULOCYTES # BLD AUTO: 0.75 10*3/MM3 (ref 0–0.05)
IMM GRANULOCYTES NFR BLD AUTO: 4 % (ref 0–0.5)
LYMPHOCYTES # BLD AUTO: 1.97 10*3/MM3 (ref 0.7–3.1)
LYMPHOCYTES NFR BLD AUTO: 10.6 % (ref 19.6–45.3)
MAGNESIUM SERPL-MCNC: 1.9 MG/DL (ref 1.6–2.6)
MCH RBC QN AUTO: 30.1 PG (ref 26.6–33)
MCHC RBC AUTO-ENTMCNC: 32.2 G/DL (ref 31.5–35.7)
MCV RBC AUTO: 93.4 FL (ref 79–97)
MONOCYTES # BLD AUTO: 1.63 10*3/MM3 (ref 0.1–0.9)
MONOCYTES NFR BLD AUTO: 8.8 % (ref 5–12)
NEUTROPHILS NFR BLD AUTO: 13.99 10*3/MM3 (ref 1.7–7)
NEUTROPHILS NFR BLD AUTO: 75.4 % (ref 42.7–76)
NRBC BLD AUTO-RTO: 0 /100 WBC (ref 0–0.2)
PHOSPHATE SERPL-MCNC: 2.5 MG/DL (ref 2.5–4.5)
PLATELET # BLD AUTO: 392 10*3/MM3 (ref 140–450)
PMV BLD AUTO: 9.8 FL (ref 6–12)
POTASSIUM SERPL-SCNC: 4.1 MMOL/L (ref 3.5–5.2)
RBC # BLD AUTO: 3.49 10*6/MM3 (ref 3.77–5.28)
SODIUM SERPL-SCNC: 138 MMOL/L (ref 136–145)
WBC NRBC COR # BLD AUTO: 18.55 10*3/MM3 (ref 3.4–10.8)

## 2024-11-15 PROCEDURE — 80048 BASIC METABOLIC PNL TOTAL CA: CPT | Performed by: STUDENT IN AN ORGANIZED HEALTH CARE EDUCATION/TRAINING PROGRAM

## 2024-11-15 PROCEDURE — 84100 ASSAY OF PHOSPHORUS: CPT | Performed by: STUDENT IN AN ORGANIZED HEALTH CARE EDUCATION/TRAINING PROGRAM

## 2024-11-15 PROCEDURE — 83735 ASSAY OF MAGNESIUM: CPT | Performed by: STUDENT IN AN ORGANIZED HEALTH CARE EDUCATION/TRAINING PROGRAM

## 2024-11-15 PROCEDURE — 99231 SBSQ HOSP IP/OBS SF/LOW 25: CPT | Performed by: PHYSICIAN ASSISTANT

## 2024-11-15 PROCEDURE — 25010000002 MAGNESIUM SULFATE 4 GM/100ML SOLUTION: Performed by: STUDENT IN AN ORGANIZED HEALTH CARE EDUCATION/TRAINING PROGRAM

## 2024-11-15 PROCEDURE — 25010000002 PIPERACILLIN SOD-TAZOBACTAM PER 1 G: Performed by: INTERNAL MEDICINE

## 2024-11-15 PROCEDURE — 85025 COMPLETE CBC W/AUTO DIFF WBC: CPT | Performed by: STUDENT IN AN ORGANIZED HEALTH CARE EDUCATION/TRAINING PROGRAM

## 2024-11-15 PROCEDURE — 99239 HOSP IP/OBS DSCHRG MGMT >30: CPT | Performed by: STUDENT IN AN ORGANIZED HEALTH CARE EDUCATION/TRAINING PROGRAM

## 2024-11-15 PROCEDURE — 25010000002 HEPARIN (PORCINE) PER 1000 UNITS: Performed by: INTERNAL MEDICINE

## 2024-11-15 RX ORDER — MAGNESIUM SULFATE HEPTAHYDRATE 40 MG/ML
4 INJECTION, SOLUTION INTRAVENOUS ONCE
Status: COMPLETED | OUTPATIENT
Start: 2024-11-15 | End: 2024-11-15

## 2024-11-15 RX ORDER — METOPROLOL TARTRATE 25 MG/1
25 TABLET, FILM COATED ORAL 2 TIMES DAILY
Qty: 60 TABLET | Refills: 0 | Status: SHIPPED | OUTPATIENT
Start: 2024-11-15 | End: 2024-12-15

## 2024-11-15 RX ADMIN — PANTOPRAZOLE SODIUM 40 MG: 40 TABLET, DELAYED RELEASE ORAL at 04:41

## 2024-11-15 RX ADMIN — ACETAMINOPHEN 650 MG: 325 TABLET ORAL at 14:30

## 2024-11-15 RX ADMIN — HEPARIN SODIUM 5000 UNITS: 5000 INJECTION INTRAVENOUS; SUBCUTANEOUS at 04:40

## 2024-11-15 RX ADMIN — ACETAMINOPHEN 650 MG: 325 TABLET ORAL at 08:50

## 2024-11-15 RX ADMIN — METOPROLOL TARTRATE 25 MG: 25 TABLET, FILM COATED ORAL at 08:50

## 2024-11-15 RX ADMIN — MAGNESIUM SULFATE IN WATER FOR 4 G: 40 INJECTION INTRAVENOUS at 11:25

## 2024-11-15 RX ADMIN — PIPERACILLIN AND TAZOBACTAM 3.38 G: 3; .375 INJECTION, POWDER, LYOPHILIZED, FOR SOLUTION INTRAVENOUS at 00:31

## 2024-11-15 RX ADMIN — HEPARIN SODIUM 5000 UNITS: 5000 INJECTION INTRAVENOUS; SUBCUTANEOUS at 14:30

## 2024-11-15 RX ADMIN — FLUOXETINE HYDROCHLORIDE 40 MG: 20 CAPSULE ORAL at 08:50

## 2024-11-15 RX ADMIN — MUPIROCIN 1 APPLICATION: 20 OINTMENT TOPICAL at 08:50

## 2024-11-15 NOTE — PROGRESS NOTES
"GI Daily Progress Note  Subjective:    Chief Complaint:  Follow up post ERCP pancreatitis     Eder says she is feeling improved.  Denies abdominal pain at this time.  Tolerating small amounts of regular diet.   Had 2 bowel movements.   Supplemental oxygen discontinued.       Objective:    /70 (BP Location: Right arm, Patient Position: Lying)   Pulse 87   Temp 98.9 °F (37.2 °C) (Oral)   Resp 14   Ht 157.5 cm (62\")   Wt 84.4 kg (186 lb)   LMP 10/29/2024 (Approximate)   SpO2 98%   BMI 34.02 kg/m²     Physical Exam  Cardiovascular:      Rate and Rhythm: Normal rate and regular rhythm.   Pulmonary:      Effort: Pulmonary effort is normal.   Abdominal:      General: Bowel sounds are normal. There is no distension.      Palpations: Abdomen is soft.      Tenderness: There is no abdominal tenderness. There is no guarding.      Comments: Drain in place   Neurological:      Mental Status: She is oriented to person, place, and time.   Psychiatric:         Behavior: Behavior normal.         Lab  Lab Results   Component Value Date    WBC 18.55 (H) 11/15/2024    HGB 10.5 (L) 11/15/2024    HGB 9.6 (L) 11/14/2024    HGB 10.8 (L) 11/13/2024    MCV 93.4 11/15/2024     11/15/2024    INR 1.02 11/10/2024       Lab Results   Component Value Date    GLUCOSE 97 11/15/2024    BUN 7 11/15/2024    CREATININE 0.35 (L) 11/15/2024    BCR 20.0 11/15/2024     11/15/2024    K 4.1 11/15/2024    CO2 22.0 11/15/2024    CALCIUM 7.9 (L) 11/15/2024    ALBUMIN 2.7 (L) 11/14/2024    ALKPHOS 135 (H) 11/14/2024    BILITOT 1.1 11/14/2024    ALT 54 (H) 11/14/2024    AST 29 11/14/2024       Assessment:    Choledocholithiasis with biliary obstruction s/p ERCP on 11/9/24 with sphincterotomy and balloon sweep.     Post ERCP pancreatitis   Cholelithiasis with chronic cholecystitis s/p cholecystectomy on 11/10/24. POD # 5  SIRS secondary to pancreatitis, improving   Pleural effusions and ascites    Plan:    >> Continues to clinically " improve.   Okay from a GI standpoint for discharge home.   Follow up with PCP next week as well as Dr. Raza as scheduled.        ROXY Taylor  11/15/24  14:24 EST

## 2024-11-15 NOTE — PROGRESS NOTES
"Patient Name:  Eder Wills  YOB: 2000  [id number]    Surgery Progress Note    Date of visit: 11/15/2024      Subjective: Continues to improve.  Tells me that she feels \"good this morning \".  Tolerated small amounts of regular diet yesterday.  Had 2 or 3 bowel movements.  No fevers or chills.  Drain with 200 mL of cloudy ascites output          Objective:     /80 (BP Location: Right arm, Patient Position: Sitting)   Pulse 85   Temp 98.7 °F (37.1 °C) (Oral)   Resp 18   Ht 157.5 cm (62\")   Wt 84.4 kg (186 lb)   LMP 10/29/2024 (Approximate)   SpO2 100%   BMI 34.02 kg/m²     Intake/Output Summary (Last 24 hours) at 11/15/2024 0720  Last data filed at 11/15/2024 0444  Gross per 24 hour   Intake --   Output 200 ml   Net -200 ml       GEN:   Awake, alert, in no acute distress, resting comfortably in bed   CV:   Regular rate and rhythm  L:  Symmetric expansion, not labored on room air  Abd:  Soft, appropriately tender to palpation along incisions, incisions are clean, dry, and intact, not obviously distended, BRUNO drain in the right upper quadrant with some cloudy straw-colored output  Ext:  No cyanosis, clubbing, or edema    Recent labs that are back at this time have been reviewed.           Assessment/ Plan:    Mrs. Wills is a 23-year-old lady without significant past medical history with choledocholithiasis      # Choledocholithiasis  # Pancreatitis  -Status post ERCP 11/9/24  -Status post Lap Pamela 11/10/24  -Labs are pending.  -Clinically much improved.  Tolerating diet.  Having bowel function.  No significant plaints of abdominal pain or distention  -No further need for antibiotics from a gallbladder standpoint  -Okay for discharge from my standpoint as long as labs are stable and she continues to show improvement.  Would plan for BRUNO drain removal prior to discharge.  Can follow-up me in 2 weeks      Raheem Raza MD  11/15/2024  07:20 EST      "

## 2024-11-15 NOTE — DISCHARGE SUMMARY
[facility] Medicine Services  DISCHARGE SUMMARY    Patient Name: Eder Wills  : 2000  MRN: [id number]    Date of Admission: 2024 10:38 PM  Date of Discharge:  11/15/24  Primary Care Physician: Eva Tovar MD    Consults       Date and Time Order Name Status Description    2024  7:23 AM Inpatient General Surgery Consult Completed     2024  2:51 AM Inpatient Gastroenterology Consult Completed             Hospital Course     Presenting Problem: Right upper quadrant abdominal pain    Active Hospital Problems    Diagnosis  POA    Obesity (BMI 30-39.9) [E66.9]  Yes    Mood disorder [F39]  Yes      Resolved Hospital Problems    Diagnosis Date Resolved POA    **Biliary obstruction [K83.1] 11/15/2024 Yes    Post-ERCP acute pancreatitis [K91.89, K85.90] 11/15/2024 No    Calculus of gallbladder and bile duct with obstruction [K80.71] 11/15/2024 Yes          Hospital Course:  Eder Wills is a 24 y.o. female with PMHx of anxiety who presented with right upper quadrant abdominal pain.  Patient found to have biliary obstruction, status post ERCP, biliary sphincterotomy, balloon sweep on .  Status post laparoscopic cholecystectomy on 11/10 with BRUNO drain placement.  Hospital admission complicated by ERCP induced pancreatitis.  Status post IV fluids and tolerating p.o. intake/having appropriate BM's prior to discharge.  Status post empiric IV Zosyn.  No indication for continued outpatient antibiotics. BRUNO drain removed prior to discharge.  Patient will continue GI soft diet for 1 week after discharge.  Patient to follow-up with surgery in 2 weeks.  Patient to follow-up with primary care physician for evaluation of weaning off metoprolol tartrate started inpatient for tachycardia.     RUQ pain  Biliary obstruction  -CT abdomen pelvis with cholelithiasis with mild stranding around gallbladder along with mild intra and extra hepatic biliary duct dilatation  -MRCP  with findings suggestive of acute cholecystitis and choledocholithiasis  -CT abdomen pelvis obtained after ERCP consistent with acute pericarditis  -Status post ERCP, biliary sphincterotomy and balloon sweep on 11/9/2024  -Status post laparoscopic cholecystectomy on 11/10/2024.  BRUNO drain placed during procedure  -Complicated by ERCP induced pancreatitis, status post IV fluids and tolerating p.o. intake  -Downgraded from ICU on 11/13  -Started on empiric antibiotics, Zosyn.  No concern for ongoing infection, antibiotics discontinued prior to discharge.   -Continue GI soft diet for 1 week.  Then advance diet as tolerated.  -BRUNO drain removed prior to discharge  -Follow-up with surgery in 2 weeks     Tachypnea  Tachycardia  -CTA chest negative for PE, small bilateral pleural effusions with compressive atelectasis  -BB started in ICU for tachycardia   -Continue metoprolol tartrate 25mg BID. Follow up with PCP for evaluation of weaning off metoprolol tartrate.      Anxiety  - Continue home prozac    Leukocytosis  - Repeat with PCP in 1 week       Discharge Follow Up Recommendations for outpatient labs/diagnostics:  Continue GI soft diet for 1 week after discharge.  Follow-up with surgery in 2 weeks.  Follow-up with primary care physician for evaluation of weaning off metoprolol tartrate started inpatient for tachycardia.    Day of Discharge     HPI:   Patient seen resting comfortably in bed no acute distress.  Patient denies any abdominal pain currently.  Patient having adequate bowel movements and tolerating p.o. intake.  Discussed plan as documented above with patient.  All questions answered.        Vital Signs:   Temp:  [98.3 °F (36.8 °C)-99.3 °F (37.4 °C)] 98.9 °F (37.2 °C)  Heart Rate:  [] 87  Resp:  [14-18] 14  BP: (105-135)/(70-92) 110/70      Physical Exam  Constitutional:       General: She is not in acute distress.  Eyes:      General: No scleral icterus.     Extraocular Movements: Extraocular movements  intact.   Cardiovascular:      Rate and Rhythm: Normal rate.      Pulses: Normal pulses.   Pulmonary:      Effort: Pulmonary effort is normal. No respiratory distress.   Abdominal:      General: There is no distension.      Palpations: Abdomen is soft.      Tenderness: There is no abdominal tenderness. There is no guarding or rebound.   Musculoskeletal:      Right lower leg: No edema.      Left lower leg: No edema.   Skin:     General: Skin is warm.   Neurological:      General: No focal deficit present.      Mental Status: She is alert.   Psychiatric:         Mood and Affect: Mood normal.         Behavior: Behavior normal.          Pertinent  and/or Most Recent Results     LAB RESULTS:      Lab 11/15/24  0859 11/14/24  0357 11/13/24  0610 11/13/24  0033 11/12/24  1808 11/12/24  1210 11/12/24  0401 11/11/24  1632 11/11/24  1239 11/11/24  0132 11/10/24  0522   WBC 18.55* 15.81* 18.00*  --   --   --  23.38*  --  33.78* 31.11* 18.12*   HEMOGLOBIN 10.5* 9.6* 10.8* 11.0* 11.0*   < > 11.6*  --  15.8 16.3* 15.8   HEMATOCRIT 32.6* 29.1* 33.2* 33.5* 33.3*   < > 35.0  --  48.9* 49.3* 47.6*   PLATELETS 392 319 288  --   --   --  288  --  465* 483* 450   NEUTROS ABS 13.99* 12.75*  --   --   --   --  20.48*  --  29.97* 27.19* 15.84*   IMMATURE GRANS (ABS) 0.75* 0.14*  --   --   --   --  0.18*  --  0.30* 0.18* 0.10*   LYMPHS ABS 1.97 1.49  --   --   --   --  1.21  --  1.22 1.22 0.76   MONOS ABS 1.63* 1.33*  --   --   --   --  1.48*  --  2.21* 2.44* 1.39*   EOS ABS 0.16 0.07  --   --   --   --  0.00  --  0.00 0.00 0.00   MCV 93.4 92.1 93.3  --   --   --  90.9  --  94.0 91.8 91.5   CRP  --   --   --   --   --   --   --   --   --  13.18* 1.82*   PROCALCITONIN  --   --   --   --   --   --   --   --   --   --  0.04   LACTATE  --   --   --   --   --   --  1.0 1.9  --   --   --    LDH  --   --   --   --   --   --   --   --  366*  --   --    PROTIME  --   --   --   --   --   --   --   --   --   --  13.5    < > = values in this interval  not displayed.         Lab 11/15/24  0859 11/14/24  1358 11/14/24  0855 11/14/24  0357 11/13/24  1647 11/13/24  0610 11/13/24  0032 11/12/24  1808 11/12/24 0401 11/11/24  1632 11/11/24  1239   SODIUM 138  --   --  138  --  134*  --  136 134*  --   --    POTASSIUM 4.1 4.8  --  3.5  --  3.9  --  4.2 3.6  --   --    CHLORIDE 105  --   --  104  --  102  --  104 101  --   --    CO2 22.0  --   --  23.0  --  23.0  --  24.0 23.0  --   --    ANION GAP 11.0  --   --  11.0  --  9.0  --  8.0 10.0  --   --    BUN 7  --   --  7  --  7  --  7 8  --   --    CREATININE 0.35*  --   --  0.41*  --  0.42*  --  0.43* 0.51*  --   --    EGFR 148.4  --   --  143.8  --  142.9  --  142.1 136.4  --   --    GLUCOSE 97  --   --  102*  --  100*  --  130* 116*  --   --    CALCIUM 7.9*  --   --  7.3*  --  7.8*  --  7.6* 7.6*  --   --    IONIZED CALCIUM  --   --   --   --   --   --   --   --  1.04* 1.01*  --    MAGNESIUM 1.9  --  2.4 1.9  --  2.1  --  2.6 1.7  --   --    PHOSPHORUS 2.5 1.7*  --  1.9* 1.7* 2.4*   < > 2.0* 1.0*  --    < >    < > = values in this interval not displayed.         Lab 11/14/24  0357 11/13/24  0610 11/12/24  0401 11/11/24  1239 11/11/24  0132 11/10/24  0522   TOTAL PROTEIN 4.8* 5.0* 4.6* 4.7* 5.6* 6.4   ALBUMIN 2.7* 2.8* 3.4* 2.9* 3.2* 4.2   GLOBULIN 2.1 2.2 1.2 1.8 2.4 2.2   ALT (SGPT) 54* 71* 81* 136* 195* 351*   AST (SGOT) 29 24 24 30 39* 86*   BILIRUBIN 1.1 1.4* 1.7* 1.5* 1.7* 2.4*   ALK PHOS 135* 126* 73 137* 183* 294*   LIPASE 39 134* 607*  --  1,257* 2,820*         Lab 11/10/24  0522   PROTIME 13.5   INR 1.02                 Brief Urine Lab Results  (Last result in the past 365 days)        Color   Clarity   Blood   Leuk Est   Nitrite   Protein   CREAT   Urine HCG        11/08/24 0113 Yellow   Clear   Negative   Negative   Negative   Negative                 Microbiology Results (last 10 days)       Procedure Component Value - Date/Time    Blood Culture - Blood, Hand, Left [[id number]]  (Normal) Collected: 11/11/24  1632    Lab Status: Preliminary result Specimen: Blood from Hand, Left Updated: 11/14/24 1700     Blood Culture No growth at 3 days    Blood Culture - Blood, Hand, Left [[id number]]  (Normal) Collected: 11/11/24 1240    Lab Status: Preliminary result Specimen: Blood from Hand, Left Updated: 11/15/24 1315     Blood Culture No growth at 4 days    Narrative:      Less than seven (7) mL's of blood was collected.  Insufficient quantity may yield false negative results.    Blood Culture - Blood, Hand, Left [[id number]]  (Normal) Collected: 11/08/24 0530    Lab Status: Final result Specimen: Blood from Hand, Left Updated: 11/13/24 0600     Blood Culture No growth at 5 days    Narrative:      Less than seven (7) mL's of blood was collected.  Insufficient quantity may yield false negative results.    Blood Culture - Blood, Arm, Left [[id number]]  (Normal) Collected: 11/08/24 0525    Lab Status: Final result Specimen: Blood from Arm, Left Updated: 11/13/24 0600     Blood Culture No growth at 5 days    Narrative:      Less than seven (7) mL's of blood was collected.  Insufficient quantity may yield false negative results.            CT Angiogram Chest    Result Date: 11/11/2024  CT ANGIOGRAM CHEST, CT ABD W CONTRAST PELVIS W WO CONTRAST Date of Exam: 11/11/2024 12:46 PM EST Indication: pleuritic chest pasin, tachycardia. Comparison: CT abdomen and pelvis of 11/8/2024. Technique: CTA of the chest was performed before and after the uneventful intravenous administration of 85 mL Isovue-370. Reconstructed coronal and sagittal images were also obtained. In addition, a 3-D volume rendered image was created for interpretation. Automated exposure control and iterative reconstruction methods were used. Findings: CT chest: There are no filling defects suspicious for pulmonary emboli. There are no enlarged mediastinal nodes. There are no hilar masses. There are small bilateral pleural effusions. There is compressive atelectasis in the lung  bases. The heart size is  normal. There is no pericardial effusion. CT abdomen and pelvis: The gallbladder has been removed. An oval nonenhancing fluid collection is identified in the gallbladder fossa measuring 6 x 2 cm. There is a surgical drain entering the right abdomen having its tip near the gallbladder fossa. There is new moderately large amount of abdominal ascites and mild amount of pelvic ascites. There is peripancreatic soft tissue stranding and fluid. There is no evidence of a focal enhancing or encapsulated fluid collection. There is no bile duct dilatation. There are no focal liver lesions. Spleen and adrenal glands appear normal. There are no renal lesions or hydronephrosis. Partially filled bladder appears grossly normal. There is no pelvic mass. There is no large or small bowel dilatation. There is a small amount of free intraperitoneal air identified over the left hepatic lobe. The stomach is collapsed.     Negative exam for pulmonary embolism. Small bilateral pleural effusions with mild compressive bibasilar atelectasis. Findings compatible with acute pancreatitis. Moderate amount of abdominal and small amount of pelvic ascites. Small nonencapsulated fluid collection in the gallbladder fossa, status post cholecystectomy. Small amount of free air is compatible with recent  surgery. Surgical drain is in place. Electronically Signed: Kristi Matta MD  11/11/2024 1:40 PM EST  Workstation ID: [id number]    CT Abd With Contrast Pelvis With & Without Contrast    Result Date: 11/11/2024  CT ANGIOGRAM CHEST, CT ABD W CONTRAST PELVIS W WO CONTRAST Date of Exam: 11/11/2024 12:46 PM EST Indication: pleuritic chest pasin, tachycardia. Comparison: CT abdomen and pelvis of 11/8/2024. Technique: CTA of the chest was performed before and after the uneventful intravenous administration of 85 mL Isovue-370. Reconstructed coronal and sagittal images were also obtained. In addition, a 3-D volume rendered image was  created for interpretation. Automated exposure control and iterative reconstruction methods were used. Findings: CT chest: There are no filling defects suspicious for pulmonary emboli. There are no enlarged mediastinal nodes. There are no hilar masses. There are small bilateral pleural effusions. There is compressive atelectasis in the lung bases. The heart size is  normal. There is no pericardial effusion. CT abdomen and pelvis: The gallbladder has been removed. An oval nonenhancing fluid collection is identified in the gallbladder fossa measuring 6 x 2 cm. There is a surgical drain entering the right abdomen having its tip near the gallbladder fossa. There is new moderately large amount of abdominal ascites and mild amount of pelvic ascites. There is peripancreatic soft tissue stranding and fluid. There is no evidence of a focal enhancing or encapsulated fluid collection. There is no bile duct dilatation. There are no focal liver lesions. Spleen and adrenal glands appear normal. There are no renal lesions or hydronephrosis. Partially filled bladder appears grossly normal. There is no pelvic mass. There is no large or small bowel dilatation. There is a small amount of free intraperitoneal air identified over the left hepatic lobe. The stomach is collapsed.     Negative exam for pulmonary embolism. Small bilateral pleural effusions with mild compressive bibasilar atelectasis. Findings compatible with acute pancreatitis. Moderate amount of abdominal and small amount of pelvic ascites. Small nonencapsulated fluid collection in the gallbladder fossa, status post cholecystectomy. Small amount of free air is compatible with recent  surgery. Surgical drain is in place. Electronically Signed: Kristi Matta MD  11/11/2024 1:40 PM EST  Workstation ID: [id number]    FL ERCP pancreatic and biliary ducts    Result Date: 11/11/2024  FL ERCP PANCREATIC AND BILIARY DUCTS Date of Exam: 11/9/2024 8:00 AM EST Indication: ENDOSCOPIC  RETROGRADE CHOLANGIOPANCREATOGRAPHY.   Comparison: None available. Technique:  A series of radiographic digital spot films were obtained in conjunction with an endoscopic catheterization of the biliary and pancreatic ductal system, performed by the gastroenterologist. Fluoroscopic Time: 1 minute 4 seconds Number of Images: 11 Findings: Fluoroscopy demonstrating filling of prominent bile ducts.     Impression: Fluoroscopy demonstrating filling of prominent bile ducts. Please see procedure report for full findings. Electronically Signed: William Ingram MD  11/11/2024 10:20 AM EST  Workstation ID: [id number]    MRI abdomen wo contrast mrcp    Result Date: 11/8/2024  MRI ABDOMEN WO CONTRAST MRCP Date of Exam: 11/8/2024 4:57 AM EST Indication: ? obstructing stone.  RUQ pain, cholelithiasis on CT A/P.  Comparison: Ultrasound right upper quadrant November 8, 2024; CT abdomen pelvis November 8, 2024 Technique: Standard noncontrast MR pulse sequences of the abdomen were obtained. High-resolution T2 MRCP images were acquired and 3-D MIP reconstructions were created for interpretation. Findings: Lung bases appear grossly clear.  Visualized mediastinal structures are unremarkable.  Superficial fat and underlying musculature appear within normal limits.  Bone marrow signal appears grossly unremarkable. The liver appears normal in size and signal.  Liver vasculature appears conventional and patent.  There is no intrahepatic biliary dilatation. The gallbladder is nondistended. There are innumerable stones throughout the gallbladder. There is gallbladder wall thickening and pericholecystic fluid suggestive of acute cholecystitis. Common bile duct measures 8 mm diameter. There are a few small stones in the distal common bile duct The pancreas appears normal signal without solid or cystic mass or adjacent inflammation. The pancreatic duct is nondistended.  There is no evidence of pancreatic divisum. The spleen is normal size and  signal.  There is no adrenal mass.  No solid or cystic kidney mass or hydronephrosis.  There is no focal intraperitoneal inflammation or fluid collection.  No evidence of ascites. No lymphadenopathy.     Impression: 1.Cholelithiasis with gallbladder wall thickening and pericholecystic fluid suggestive of acute cholecystitis. 2.Choledocholithiasis with a few small stones in the distal common bile duct. Electronically Signed: Dmitriy Spaulding MD  11/8/2024 5:56 AM EST  Workstation ID: [id number]    US Gallbladder    Result Date: 11/8/2024  US GALLBLADDER Date of Exam: 11/8/2024 1:33 AM EST Indication: RUQ pain. Comparison: CT abdomen pelvis dated November 8, 2024 Technique: Grayscale and color Doppler ultrasound evaluation of the right upper quadrant was performed. Findings: The liver is homogeneous. There are no focal liver lesions. Portal venous and hepatic venous flows are normal. There are multiple stones in the gallbladder. There is marked biliary ductal dilatation up to 1.5 cm. Obstructing gallstone would be in the differential. Correlation with MRCP or ERCP may be required. The visualized pancreatic tissue is normal. The right kidney is 9.3 cm in [id number] length and there is no hydronephrosis.     Impression: 1.Cholelithiasis. 2.Marked biliary ductal dilatation. Correlation with MRCP or ERCP may be required. Electronically Signed: Dmitriy Spaulding MD  11/8/2024 2:28 AM EST  Workstation ID: [id number]    CT Abdomen Pelvis With Contrast    Result Date: 11/8/2024  CT ABDOMEN PELVIS W CONTRAST Date of Exam: 11/8/2024 12:38 AM EST Indication: RUQ pain, jaundice. Comparison: None available. Technique: Axial CT images were obtained of the abdomen and pelvis following the uneventful intravenous administration of 85 mL Isovue-300. Reconstructed coronal and sagittal images were also obtained. Automated exposure control and iterative construction methods were used. Findings: Lung Bases:   The visualized lung bases and lower  mediastinal structures are unremarkable. Liver: Liver is normal in size and CT density. No focal lesions. Biliary/Gallbladder:  There are multiple stones in the gallbladder. There is mild stranding around the gallbladder wall. There is mild intra and proximal extrahepatic biliary ductal dilatation. Spleen: Spleen is normal in size and CT density. Pancreas:  Pancreas is normal. There is no evidence of pancreatic mass or peripancreatic fluid. Kidneys:  Kidneys are normal in size. There are no stones or hydronephrosis. Adrenals:  Adrenal glands are unremarkable. Retroperitoneal/Lymph Nodes/Vasculature:  No retroperitoneal adenopathy is identified. Gastrointestinal/Mesentery:  The bowel loops are non-dilated without wall thickening or mass. The appendix appears within normal limits. No evidence of obstruction. No free air. No mesenteric fluid collections identified. Bladder:  The bladder is normal. Genital:   Unremarkable       Bony Structures:   Visualized bony structures are consistent with the patient's age.     Impression: Cholelithiasis with mild stranding around the gallbladder wall and mild intra and proximal extrahepatic biliary ductal dilatation. Electronically Signed: Dmitriy Spaulding MD  11/8/2024 1:18 AM EST  Workstation ID: [id number]                 Plan for Follow-up of Pending Labs/Results: Will follow up   Pending Labs       Order Current Status    Blood Culture - Blood, Hand, Left Preliminary result    Blood Culture - Blood, Hand, Left Preliminary result          Discharge Details        Discharge Medications        New Medications        Instructions Start Date   metoprolol tartrate 25 MG tablet  Commonly known as: LOPRESSOR   25 mg, Oral, 2 Times Daily             Continue These Medications        Instructions Start Date   amphetamine-dextroamphetamine XR 30 MG 24 hr capsule  Commonly known as: ADDERALL XR   1 capsule, Daily      FLUoxetine 40 MG capsule  Commonly known as: PROzac   1 capsule, Daily                No Known Allergies      Discharge Disposition:  Home or Self Care    Diet:  Hospital:  Diet Order   Procedures    Diet: Gastrointestinal; Low Irritant, Fiber-Restricted; Fluid Consistency: Thin (IDDSI 0)       Diet Instructions       Diet: Gastrointestinal Diets; Fiber-Restricted, Low Irritant; Thin (IDDSI 0)      Discharge Diet: Gastrointestinal Diets    Gastrointestinal Diet:  Fiber-Restricted  Low Irritant       Fluid Consistency: Thin (IDDSI 0)    Follow GI diet for 1 week after discharge             Activity: As tolerated       Restrictions or Other Recommendations:  As tolerated        CODE STATUS:    Code Status and Medical Interventions: CPR (Attempt to Resuscitate); Full Support   Ordered at: 11/08/24 0236     Level Of Support Discussed With:    Patient     Code Status (Patient has no pulse and is not breathing):    CPR (Attempt to Resuscitate)     Medical Interventions (Patient has pulse or is breathing):    Full Support       No future appointments.    Additional Instructions for the Follow-ups that You Need to Schedule       Discharge Follow-up with PCP   As directed       Currently Documented PCP:    Eva Tovar MD    PCP Phone Number:    [phone]     Follow Up Details: 1 week        Discharge Follow-up with Specialty: Follow-up with surgery in 2 weeks; 2 Weeks   As directed      Specialty: Follow-up with surgery in 2 weeks   Follow Up: 2 Weeks                      Yash Henderson DO  11/15/24      Time Spent on Discharge:  I spent  40  minutes on this discharge activity which included: face-to-face encounter with the patient, reviewing the data in the system, coordination of the care with the nursing staff as well as consultants, documentation, and entering orders.

## 2024-11-15 NOTE — PAYOR COMM NOTE
"Eder Wills (24 y.o. Female)     [id number]     Clinical update      Date of Birth   2000    Social Security Number       Address   [address] [place] [address]    Home Phone   [phone]    MRN   [id number]       Faith   M[employer]onism    Marital Status   Single                            Admission Date   11/7/24    Admission Type   Emergency    Admitting Provider   Yash Henderson DO    Attending Provider   Yash Henderson DO    Department, Room/Bed   [facility] 3E, S342/1       Discharge Date       Discharge Disposition       Discharge Destination                                 Attending Provider: Yash Henderson DO    Allergies: No Known Allergies    Isolation: None   Infection: None   Code Status: CPR    Ht: 157.5 cm (62\")   Wt: 84.4 kg (186 lb)    Admission Cmt: None   Principal Problem: Biliary obstruction [K83.1]                   Active Insurance as of 11/7/2024       Primary Coverage       Payor Plan Insurance Group Employer/Plan Group    [employer] EMPLOYEE [id number]       Payor Plan Address Payor Plan Phone Number Payor Plan Fax Number Effective Dates    [address] [phone]  1/1/2015 - None Entered    [facility] [id number]         Subscriber Name Subscriber Birth Date Member ID       HILARY WILLS 7/13/1973 [id number]                     Emergency Contacts        (Rel.) Home Phone Work Phone Mobile Phone    Hilary Scruggs (Mother) [phone] -- [phone]              Vital Signs (last 2 days)       Date/Time Temp Temp src Pulse Resp BP Patient Position SpO2    11/15/24 0725 98.7 (37.1) Oral 94 16 105/72 Lying --    11/15/24 0444 98.7 (37.1) Oral 85 18 117/80 Sitting --    11/15/24 0012 99.3 (37.4) Oral 100 18 116/78 Lying --    11/14/24 2029 98.3 (36.8) Oral 113 18 135/92 Lying --    11/14/24 1545 99.2 (37.3) Oral 103 18 131/82 Sitting --    11/14/24 1220 99 (37.2) Oral 92 18 134/92 Sitting 100    11/14/24 0825 97.7 (36.5) " Oral 96 18 119/74 Sitting --    11/14/24 0250 99 (37.2) Oral 101 18 128/87 Lying 92    11/13/24 2330 100.6 (38.1) Axillary 104 16 123/89 Lying 94    11/13/24 2120 -- -- 101 -- -- -- --    11/13/24 1950 99.1 (37.3) Oral 107 18 129/89 Sitting 94    11/13/24 1840 -- -- 111 -- -- -- 96    11/13/24 1531 98.9 (37.2) Oral 113 18 140/95 Lying --    11/13/24 1400 -- -- 94 30 -- -- 100    11/13/24 1300 -- -- 100 28 -- -- 98    11/13/24 1200 98.1 (36.7) Oral 90 -- -- -- 100    11/13/24 1100 -- -- 92 -- 132/86 -- 98    11/13/24 1000 -- -- 112 -- 137/84 -- 98    11/13/24 0900 98.7 (37.1) Axillary 131 40 151/97 Lying 89    11/13/24 0800 -- -- 101 36 -- -- 100    11/13/24 0700 -- -- 125 36 146/101 Lying 94    11/13/24 0600 -- -- 108 -- 136/93 -- 98    11/13/24 0500 -- -- 104 -- 129/92 -- 99    11/13/24 0400 -- -- 115 -- 155/105 -- 95    11/13/24 0330 -- -- 128 -- -- -- 93    11/13/24 0300 -- -- 106 -- 167/105 -- 100    11/13/24 0230 -- -- 107 -- -- -- 100    11/13/24 0200 -- -- 116 -- -- -- 96    11/13/24 0130 -- -- 111 -- -- -- 97    11/13/24 0100 -- -- 105 -- 131/81 -- 99    11/13/24 0030 -- -- 107 -- -- -- 100    11/13/24 0000 -- -- 118 -- -- -- 96          Oxygen Therapy (last 2 days)       Date/Time SpO2 Device (Oxygen Therapy) Flow (L/min) (Oxygen Therapy) Oxygen Concentration (%) ETCO2 (mmHg)    11/15/24 0636 -- room air -- -- --    11/15/24 0444 -- room air -- -- --    11/15/24 0250 -- room air -- -- --    11/15/24 0012 -- room air -- -- --    11/14/24 2246 -- room air -- -- --    11/14/24 2029 -- room air -- -- --    11/14/24 2025 -- room air -- -- --    11/14/24 1800 -- room air -- -- --    11/14/24 1600 -- room air -- -- --    11/14/24 1400 -- room air -- -- --    11/14/24 1220 100 -- -- -- --    11/14/24 1200 -- room air -- -- --    11/14/24 1000 -- nasal cannula 2 -- --    11/14/24 0800 -- nasal cannula 2 -- --    11/14/24 0600 -- nasal cannula 2 -- --    11/14/24 0400 -- nasal cannula 2 -- --    11/14/24 0250 92 --  -- -- --    11/14/24 0200 -- nasal cannula 2 -- --    11/14/24 0000 -- nasal cannula 2 -- --    11/13/24 2330 94 -- -- -- --    11/13/24 2200 -- nasal cannula 2 -- --    11/13/24 2000 -- nasal cannula 2 -- --    11/13/24 1950 94 -- -- -- --    11/13/24 1840 96 -- -- -- --    11/13/24 1800 -- nasal cannula 2 -- --    11/13/24 1600 -- nasal cannula 2 -- --    11/13/24 1530 -- nasal cannula 2 -- --    11/13/24 1400 100 nasal cannula with ETCO2 2 -- 32    11/13/24 1300 98 -- -- -- 29    11/13/24 1200 100 nasal cannula with ETCO2 2 -- 33    11/13/24 1100 98 -- -- -- 30    11/13/24 1000 98 -- -- -- 32    11/13/24 0900 89 nasal cannula with ETCO2 2 -- 1    11/13/24 0800 100 nasal cannula with ETCO2 2 -- 33    11/13/24 0700 94 -- -- -- 32    11/13/24 0600 98 nasal cannula with ETCO2 2 -- 30    11/13/24 0500 99 -- -- -- 33    11/13/24 0400 95 nasal cannula with ETCO2 2 -- 30    11/13/24 0330 93 -- -- -- 31    11/13/24 0300 100 -- -- -- 33    11/13/24 0230 100 -- -- -- 33    11/13/24 0200 96 nasal cannula with ETCO2 2 -- 31    11/13/24 0130 97 -- -- -- 30    11/13/24 0100 99 -- -- -- 32    11/13/24 0030 100 -- -- -- 33    11/13/24 0000 96 nasal cannula with ETCO2 2 -- 31          Lines, Drains & Airways       Active LDAs       Name Placement date Placement time Site Days    Peripheral IV 11/12/24 1700 Right Arm 11/12/24  1700  Arm  2    Peripheral IV 11/13/24 1300 Anterior;Left Forearm 11/13/24  1300  Forearm  1    Closed/Suction Drain 1 Right;Anterior Abdomen Bulb 10 Fr. 11/10/24  0900  Abdomen  5                  Current Facility-Administered Medications   Medication Dose Route Frequency Provider Last Rate Last Admin    acetaminophen (TYLENOL) tablet 650 mg  650 mg Oral Q6H PRN Isabel Nice MD   650 mg at 11/15/24 0850    Calcium Replacement - Follow Nurse / BPA Driven Protocol   Not Applicable PRN Isabel Nice MD        diazePAM (VALIUM) tablet 5 mg  5 mg Oral Q6H PRN Isabel Nice MD   5 mg at  11/13/24 0932    FLUoxetine (PROzac) capsule 40 mg  40 mg Oral Daily Isabel Nice MD   40 mg at 11/15/24 0850    heparin (porcine) 5000 UNIT/ML injection 5,000 Units  5,000 Units Subcutaneous Q8H Isabel Nice MD   5,000 Units at 11/15/24 0440    HYDROcodone-acetaminophen (NORCO) 5-325 MG per tablet 1 tablet  1 tablet Oral Q6H PRN Isabel Nice MD   1 tablet at 11/13/24 0149    Magnesium Cardiology Dose Replacement - Follow Nurse / BPA Driven Protocol   Not Applicable PRN Isabel Nice MD        magnesium sulfate 4g/100mL (PREMIX) infusion  4 g Intravenous Once Yash Henderson DO        melatonin tablet 5 mg  5 mg Oral Nightly PRN Isabel Nice MD   5 mg at 11/14/24 2029    metoprolol tartrate (LOPRESSOR) tablet 25 mg  25 mg Oral Q12H Isabel Nice MD   25 mg at 11/15/24 0850    mupirocin (BACTROBAN) 2 % nasal ointment 1 Application  1 Application Each Nare BID Isabel Nice MD   1 Application at 11/15/24 0850    naloxone (NARCAN) injection 0.4 mg  0.4 mg Intravenous Q5 Min PRN Isabel Nice MD        ondansetron ODT (ZOFRAN-ODT) disintegrating tablet 4 mg  4 mg Oral Q6H PRN Isabel Nice MD        Or    ondansetron (ZOFRAN) injection 4 mg  4 mg Intravenous Q6H PRN Isabel Nice MD        oxyCODONE-acetaminophen (PERCOCET) 5-325 MG per tablet 2 tablet  2 tablet Oral Q4H PRN Isabel Nice MD        pantoprazole (PROTONIX) EC tablet 40 mg  40 mg Oral Q AM Isabel Nice MD   40 mg at 11/15/24 0441    Phosphorus Replacement - Follow Nurse / BPA Driven Protocol   Not Applicable PRIsabel Montejo MD        Potassium Replacement - Follow Nurse / BPA Driven Protocol   Not Applicable PRN Isabel Nice MD        promethazine (PHENERGAN) tablet 12.5 mg  12.5 mg Oral Q6H PRN Isabel Nice MD        simethicone (MYLICON) chewable tablet 80 mg  80 mg Oral 4x Daily PRN Isabel Nice MD   80  mg at 11/11/24 1650    sodium chloride 0.9 % flush 10 mL  10 mL Intravenous PRN Isabel Nice MD   10 mL at 11/14/24 2030    traZODone (DESYREL) tablet 50 mg  50 mg Oral Nightly Isabel Nice MD   50 mg at 11/14/24 2029     Lab Results (last 48 hours)       Procedure Component Value Units Date/Time    Phosphorus [[id number]]  (Normal) Collected: 11/15/24 0859    Specimen: Blood Updated: 11/15/24 1015     Phosphorus 2.5 mg/dL     Basic Metabolic Panel [[id number]]  (Abnormal) Collected: 11/15/24 0859    Specimen: Blood Updated: 11/15/24 1014     Glucose 97 mg/dL      BUN 7 mg/dL      Creatinine 0.35 mg/dL      Sodium 138 mmol/L      Potassium 4.1 mmol/L      Chloride 105 mmol/L      CO2 22.0 mmol/L      Calcium 7.9 mg/dL      BUN/Creatinine Ratio 20.0     Anion Gap 11.0 mmol/L      eGFR 148.4 mL/min/1.73     Narrative:      GFR Normal >60  Chronic Kidney Disease <60  Kidney Failure <15      Magnesium [[id number]]  (Normal) Collected: 11/15/24 0859    Specimen: Blood Updated: 11/15/24 1014     Magnesium 1.9 mg/dL     CBC & Differential [[id number]]  (Abnormal) Collected: 11/15/24 0859    Specimen: Blood Updated: 11/15/24 0944    Narrative:      The following orders were created for panel order CBC & Differential.  Procedure                               Abnormality         Status                     ---------                               -----------         ------                     CBC Auto Differential[[id number]]        Abnormal            Final result                 Please view results for these tests on the individual orders.    CBC Auto Differential [[id number]]  (Abnormal) Collected: 11/15/24 0859    Specimen: Blood Updated: 11/15/24 0944     WBC 18.55 10*3/mm3      RBC 3.49 10*6/mm3      Hemoglobin 10.5 g/dL      Hematocrit 32.6 %      MCV 93.4 fL      MCH 30.1 pg      MCHC 32.2 g/dL      RDW 14.6 %      RDW-SD 50.4 fl      MPV 9.8 fL      Platelets 392 10*3/mm3      Neutrophil % 75.4 %       Lymphocyte % 10.6 %      Monocyte % 8.8 %      Eosinophil % 0.9 %      Basophil % 0.3 %      Immature Grans % 4.0 %      Neutrophils, Absolute 13.99 10*3/mm3      Lymphocytes, Absolute 1.97 10*3/mm3      Monocytes, Absolute 1.63 10*3/mm3      Eosinophils, Absolute 0.16 10*3/mm3      Basophils, Absolute 0.05 10*3/mm3      Immature Grans, Absolute 0.75 10*3/mm3      nRBC 0.0 /100 WBC     Blood Culture - Blood, Hand, Left [[id number]]  (Normal) Collected: 11/11/24 1632    Specimen: Blood from Hand, Left Updated: 11/14/24 1700     Blood Culture No growth at 3 days    Phosphorus [[id number]]  (Abnormal) Collected: 11/14/24 1358    Specimen: Blood Updated: 11/14/24 1458     Phosphorus 1.7 mg/dL     Potassium [[id number]]  (Normal) Collected: 11/14/24 1358    Specimen: Blood Updated: 11/14/24 1436     Potassium 4.8 mmol/L      Comment: Slight hemolysis detected by analyzer. Result may be falsely elevated.       Blood Culture - Blood, Hand, Left [[id number]]  (Normal) Collected: 11/11/24 1240    Specimen: Blood from Hand, Left Updated: 11/14/24 1315     Blood Culture No growth at 3 days    Narrative:      Less than seven (7) mL's of blood was collected.  Insufficient quantity may yield false negative results.    Magnesium [[id number]]  (Normal) Collected: 11/14/24 0855    Specimen: Blood Updated: 11/14/24 0928     Magnesium 2.4 mg/dL     CBC & Differential [[id number]]  (Abnormal) Collected: 11/14/24 0357    Specimen: Blood Updated: 11/14/24 0506    Narrative:      The following orders were created for panel order CBC & Differential.  Procedure                               Abnormality         Status                     ---------                               -----------         ------                     CBC Auto Differential[[id number]]        Abnormal            Final result               Scan Slide[[id number]]                   Normal              Final result                 Please view results for these tests on the  individual orders.    CBC Auto Differential [[id number]]  (Abnormal) Collected: 11/14/24 0357    Specimen: Blood Updated: 11/14/24 0506     WBC 15.81 10*3/mm3      RBC 3.16 10*6/mm3      Hemoglobin 9.6 g/dL      Hematocrit 29.1 %      MCV 92.1 fL      MCH 30.4 pg      MCHC 33.0 g/dL      RDW 14.2 %      RDW-SD 48.3 fl      MPV 10.0 fL      Platelets 319 10*3/mm3      Neutrophil % 80.7 %      Lymphocyte % 9.4 %      Monocyte % 8.4 %      Eosinophil % 0.4 %      Basophil % 0.2 %      Immature Grans % 0.9 %      Neutrophils, Absolute 12.75 10*3/mm3      Lymphocytes, Absolute 1.49 10*3/mm3      Monocytes, Absolute 1.33 10*3/mm3      Eosinophils, Absolute 0.07 10*3/mm3      Basophils, Absolute 0.03 10*3/mm3      Immature Grans, Absolute 0.14 10*3/mm3      nRBC 0.0 /100 WBC     Narrative:      Appended report. These results have been appended to a previously verified report.    Scan Slide [[id number]]  (Normal) Collected: 11/14/24 0357    Specimen: Blood Updated: 11/14/24 0506     RBC Morphology Normal     WBC Morphology Normal     Platelet Morphology Normal    Phosphorus [[id number]]  (Abnormal) Collected: 11/14/24 0357    Specimen: Blood Updated: 11/14/24 0503     Phosphorus 1.9 mg/dL     Comprehensive Metabolic Panel [[id number]]  (Abnormal) Collected: 11/14/24 0357    Specimen: Blood Updated: 11/14/24 0453     Glucose 102 mg/dL      BUN 7 mg/dL      Creatinine 0.41 mg/dL      Sodium 138 mmol/L      Potassium 3.5 mmol/L      Chloride 104 mmol/L      CO2 23.0 mmol/L      Calcium 7.3 mg/dL      Total Protein 4.8 g/dL      Albumin 2.7 g/dL      ALT (SGPT) 54 U/L      AST (SGOT) 29 U/L      Alkaline Phosphatase 135 U/L      Total Bilirubin 1.1 mg/dL      Globulin 2.1 gm/dL      Comment: Calculated Result        A/G Ratio 1.3 g/dL      BUN/Creatinine Ratio 17.1     Anion Gap 11.0 mmol/L      eGFR 143.8 mL/min/1.73     Narrative:      GFR Normal >60  Chronic Kidney Disease <60  Kidney Failure <15      Magnesium [[id number]]   (Normal) Collected: 11/14/24 0357    Specimen: Blood Updated: 11/14/24 0453     Magnesium 1.9 mg/dL     Lipase [[id number]]  (Normal) Collected: 11/14/24 0357    Specimen: Blood Updated: 11/14/24 0453     Lipase 39 U/L     Phosphorus [[id number]]  (Abnormal) Collected: 11/13/24 1647    Specimen: Blood Updated: 11/13/24 1724     Phosphorus 1.7 mg/dL           Imaging Results (Last 48 Hours)       ** No results found for the last 48 hours. **          ECG/EMG Results (last 48 hours)       Procedure Component Value Units Date/Time    ECG 12 Lead Tachycardia [[id number]] Collected: 11/11/24 0108     Updated: 11/13/24 1137     QT Interval 302 ms      QTC Interval 432 ms     Narrative:      Test Reason : Tachycardia  Blood Pressure :   */*   mmHG  Vent. Rate : 123 BPM     Atrial Rate : 123 BPM     P-R Int : 130 ms          QRS Dur :  76 ms      QT Int : 302 ms       P-R-T Axes :  56  28  12 degrees    QTcB Int : 432 ms    Sinus tachycardia  Otherwise normal ECG  No previous ECGs available  Confirmed by Alma Reyna ([id number]) on 11/13/2024 11:37:32 AM    Referred By:            Confirmed By: Alma Reyna    ECG 12 Lead Tachycardia [[id number]] Collected: 11/11/24 1219     Updated: 11/13/24 1143     QT Interval 302 ms      QTC Interval 447 ms     Narrative:      Test Reason : Tachycardia  Blood Pressure :   */*   mmHG  Vent. Rate : 132 BPM     Atrial Rate : 132 BPM     P-R Int : 126 ms          QRS Dur :  76 ms      QT Int : 302 ms       P-R-T Axes :  38  18  11 degrees    QTcB Int : 447 ms    Sinus tachycardia  Nonspecific T wave abnormality  Abnormal ECG  When compared with ECG of 11-Nov-2024 12:17, (Unconfirmed)  No significant change was found  Confirmed by Alma Reyna ([id number]) on 11/13/2024 11:39:39 AM    Referred By:            Confirmed By: Alma Reyna          Operative/Procedure Notes (last 48 hours)  Notes from 11/13/24 1039 through 11/15/24 1039   No notes of this type exist for this encounter.         "  Physician Progress Notes (last 48 hours)        Raheem Raza MD at 11/15/24 0720          Patient Name:  Eder Wills  YOB: 2000  [id number]    Surgery Progress Note    Date of visit: 11/15/2024      Subjective: Continues to improve.  Tells me that she feels \"good this morning \".  Tolerated small amounts of regular diet yesterday.  Had 2 or 3 bowel movements.  No fevers or chills.  Drain with 200 mL of cloudy ascites output          Objective:     /80 (BP Location: Right arm, Patient Position: Sitting)   Pulse 85   Temp 98.7 °F (37.1 °C) (Oral)   Resp 18   Ht 157.5 cm (62\")   Wt 84.4 kg (186 lb)   LMP 10/29/2024 (Approximate)   SpO2 100%   BMI 34.02 kg/m²     Intake/Output Summary (Last 24 hours) at 11/15/2024 0720  Last data filed at 11/15/2024 0444  Gross per 24 hour   Intake --   Output 200 ml   Net -200 ml       GEN:   Awake, alert, in no acute distress, resting comfortably in bed   CV:   Regular rate and rhythm  L:  Symmetric expansion, not labored on room air  Abd:  Soft, appropriately tender to palpation along incisions, incisions are clean, dry, and intact, not obviously distended, BRUNO drain in the right upper quadrant with some cloudy straw-colored output  Ext:  No cyanosis, clubbing, or edema    Recent labs that are back at this time have been reviewed.           Assessment/ Plan:    Mrs. Wills is a 23-year-old lady without significant past medical history with choledocholithiasis      # Choledocholithiasis  # Pancreatitis  -Status post ERCP 11/9/24  -Status post Lap Pamela 11/10/24  -Labs are pending.  -Clinically much improved.  Tolerating diet.  Having bowel function.  No significant plaints of abdominal pain or distention  -No further need for antibiotics from a gallbladder standpoint  -Okay for discharge from my standpoint as long as labs are stable and she continues to show improvement.  Would plan for BRUNO drain removal prior to discharge.  Can follow-up me in 2 " nathan Raza MD  11/15/2024  07:20 EST        Electronically signed by Raheem Raza MD at 11/15/24 0722       Yash Henderson DO at 24 1800              [facility] Medicine Services  PROGRESS NOTE    Patient Name: Eder Wills  : 2000  MRN: [id number]    Date of Admission: 2024  Primary Care Physician: Eva Tovar MD    Subjective   Subjective     CC:  RUQ pain    HPI:  Patient seen resting comfortably in bed no acute distress.  Patient denies any abdominal pain at this time.  Tolerating p.o. intake currently.  No acute complaints concerns at this time.      Objective   Objective     Vital Signs:   Temp:  [97.7 °F (36.5 °C)-100.6 °F (38.1 °C)] 99 °F (37.2 °C)  Heart Rate:  [] 92  Resp:  [16-18] 18  BP: (119-134)/(74-92) 134/92  Flow (L/min) (Oxygen Therapy):  [2] 2     Physical Exam  Constitutional:       General: She is not in acute distress.  Eyes:      General: No scleral icterus.     Extraocular Movements: Extraocular movements intact.   Cardiovascular:      Rate and Rhythm: Normal rate.      Pulses: Normal pulses.   Pulmonary:      Effort: Pulmonary effort is normal. No respiratory distress.   Abdominal:      General: There is no distension.      Palpations: Abdomen is soft.      Tenderness: There is no abdominal tenderness. There is no guarding or rebound.      Comments: BRUNO drain in place   Musculoskeletal:      Right lower leg: No edema.      Left lower leg: No edema.   Skin:     General: Skin is warm.   Neurological:      Mental Status: She is alert and oriented to person, place, and time.   Psychiatric:         Mood and Affect: Mood normal.         Behavior: Behavior normal.           Results Reviewed:  LAB RESULTS:      Lab 24  0357 24  0610 24  0033 24  1808 24  1210 24  0401 24  1632 24  1239 24  0132 11/10/24  0522 24  0527 24  2240   WBC 15.81* 18.00*  --    --   --  23.38*  --  33.78* 31.11* 18.12*   < > 9.78   HEMOGLOBIN 9.6* 10.8* 11.0* 11.0* 11.8* 11.6*  --  15.8 16.3* 15.8   < > 12.4   HEMATOCRIT 29.1* 33.2* 33.5* 33.3* 35.4 35.0  --  48.9* 49.3* 47.6*   < > 38.0   PLATELETS 319 288  --   --   --  288  --  465* 483* 450   < > 337   NEUTROS ABS 12.75*  --   --   --   --  20.48*  --  29.97* 27.19* 15.84*   < > 7.46*   IMMATURE GRANS (ABS) 0.14*  --   --   --   --  0.18*  --  0.30* 0.18* 0.10*   < > 0.03   LYMPHS ABS 1.49  --   --   --   --  1.21  --  1.22 1.22 0.76   < > 1.46   MONOS ABS 1.33*  --   --   --   --  1.48*  --  2.21* 2.44* 1.39*   < > 0.65   EOS ABS 0.07  --   --   --   --  0.00  --  0.00 0.00 0.00   < > 0.14   MCV 92.1 93.3  --   --   --  90.9  --  94.0 91.8 91.5   < > 92.5   CRP  --   --   --   --   --   --   --   --  13.18* 1.82*  --   --    PROCALCITONIN  --   --   --   --   --   --   --   --   --  0.04  --  0.12   LACTATE  --   --   --   --   --  1.0 1.9  --   --   --   --  1.1   LDH  --   --   --   --   --   --   --  366*  --   --   --   --    PROTIME  --   --   --   --   --   --   --   --   --  13.5  --   --     < > = values in this interval not displayed.         Lab 11/14/24  1358 11/14/24  0855 11/14/24  0357 11/13/24  1647 11/13/24  0610 11/13/24  0032 11/12/24  1808 11/12/24  0401 11/11/24  1632 11/11/24  1239 11/11/24  0132   SODIUM  --   --  138  --  134*  --  136 134*  --  132*  --    POTASSIUM 4.8  --  3.5  --  3.9  --  4.2 3.6  --  4.1  --    CHLORIDE  --   --  104  --  102  --  104 101  --  100  --    CO2  --   --  23.0  --  23.0  --  24.0 23.0  --  18.0*  --    ANION GAP  --   --  11.0  --  9.0  --  8.0 10.0  --  14.0  --    BUN  --   --  7  --  7  --  7 8  --  11  --    CREATININE  --   --  0.41*  --  0.42*  --  0.43* 0.51*  --  0.70  --    EGFR  --   --  143.8  --  142.9  --  142.1 136.4  --  126.4  --    GLUCOSE  --   --  102*  --  100*  --  130* 116*  --  151*  --    CALCIUM  --   --  7.3*  --  7.8*  --  7.6* 7.6*  --  7.7*  --     IONIZED CALCIUM  --   --   --   --   --   --   --  1.04* 1.01*  --   --    MAGNESIUM  --  2.4 1.9  --  2.1  --  2.6 1.7  --   --   --    PHOSPHORUS 1.7*  --  1.9* 1.7* 2.4* 1.6* 2.0* 1.0*  --   --    < >    < > = values in this interval not displayed.         Lab 11/14/24  0357 11/13/24  0610 11/12/24  0401 11/11/24  1239 11/11/24  0132 11/10/24  0522   TOTAL PROTEIN 4.8* 5.0* 4.6* 4.7* 5.6* 6.4   ALBUMIN 2.7* 2.8* 3.4* 2.9* 3.2* 4.2   GLOBULIN 2.1 2.2 1.2 1.8 2.4 2.2   ALT (SGPT) 54* 71* 81* 136* 195* 351*   AST (SGOT) 29 24 24 30 39* 86*   BILIRUBIN 1.1 1.4* 1.7* 1.5* 1.7* 2.4*   ALK PHOS 135* 126* 73 137* 183* 294*   LIPASE 39 134* 607*  --  1,257* 2,820*         Lab 11/10/24  0522   PROTIME 13.5   INR 1.02                 Brief Urine Lab Results  (Last result in the past 365 days)        Color   Clarity   Blood   Leuk Est   Nitrite   Protein   CREAT   Urine HCG        11/08/24 0113 Yellow   Clear   Negative   Negative   Negative   Negative                   Microbiology Results Abnormal       None            No radiology results from the last 24 hrs        Current medications:  Scheduled Meds:FLUoxetine, 40 mg, Oral, Daily  heparin (porcine), 5,000 Units, Subcutaneous, Q8H  metoprolol tartrate, 25 mg, Oral, Q12H  mupirocin, 1 Application, Each Nare, BID  pantoprazole, 40 mg, Oral, Q AM  piperacillin-tazobactam, 3.375 g, Intravenous, Q8H  sodium phosphate, 15 mmol, Intravenous, Once  traZODone, 50 mg, Oral, Nightly      Continuous Infusions:   PRN Meds:.  acetaminophen    Calcium Replacement - Follow Nurse / BPA Driven Protocol    diazePAM    HYDROcodone-acetaminophen    Magnesium Cardiology Dose Replacement - Follow Nurse / BPA Driven Protocol    melatonin    [DISCONTINUED] HYDROmorphone **AND** naloxone    ondansetron ODT **OR** ondansetron    oxyCODONE-acetaminophen    Phosphorus Replacement - Follow Nurse / BPA Driven Protocol    Potassium Replacement - Follow Nurse / BPA Driven Protocol    promethazine     simethicone    [COMPLETED] Insert Peripheral IV **AND** sodium chloride    Assessment & Plan   Assessment & Plan     Active Hospital Problems    Diagnosis  POA    **Biliary obstruction [K83.1]  Yes    Obesity (BMI 30-39.9) [E66.9]  Yes    Post-ERCP acute pancreatitis [K91.89, K85.90]  No    Calculus of gallbladder and bile duct with obstruction [K80.71]  Yes    Mood disorder [F39]  Yes      Resolved Hospital Problems   No resolved problems to display.        Brief Hospital Course to date:  Eder Wlils is a 23 y.o. female with PMHx of anxiety who presented with right upper quadrant pain.  Patient found of biliary obstruction, status post ERCP, biliary sphincterotomy, balloon sweep on 11/9.  Status post laparoscopic cholecystectomy on 11/10.  Hospital admission complicated by ERCP induced pancreatitis.  Status post IV fluids and tolerating p.o. intake.  Currently completing course of empiric IV Zosyn.    RUQ pain  Biliary obstruction  -CT abdomen pelvis with cholelithiasis with mild stranding around gallbladder along with mild intra and extra hepatic biliary duct dilatation  -MRCP with findings suggestive of acute cholecystitis and choledocholithiasis  -CT abdomen pelvis obtained after ERCP consistent with acute pericarditis  -Status post ERCP, biliary sphincterotomy and balloon sweep on 11/9/2024  -Status post laparoscopic cholecystectomy on 11/10/2024.  BRUNO drain in placed during procedure  -Complicated by ERCP induced pancreatitis, status post IV fluids and tolerating p.o. intake  -Downgraded from ICU on 11/13  -Started on empiric antibiotics, Zosyn.  Plan to complete empiric 5-day course.  -Advancing diet as tolerated   -Monitor BRUNO drain output  -Surgery and GI following     Tachypnea  Tachycardia  -CTA chest negative for PE, small bilateral pleural effusions with compressive atelectasis  -Continue metoprolol for now     Anxiety  - Continue home prozac    Expected Discharge Location and Transportation:  "Home  Expected Discharge   Expected Discharge Date: 11/16/2024; Expected Discharge Time:      VTE Prophylaxis:  Pharmacologic & mechanical VTE prophylaxis orders are present.         AM-PAC 6 Clicks Score (PT): 23 (11/14/24 1349)    CODE STATUS:   Code Status and Medical Interventions: CPR (Attempt to Resuscitate); Full Support   Ordered at: 11/08/24 0236     Level Of Support Discussed With:    Patient     Code Status (Patient has no pulse and is not breathing):    CPR (Attempt to Resuscitate)     Medical Interventions (Patient has pulse or is breathing):    Full Support       Yash Henderson DO  11/14/24        Electronically signed by Yash Henderson DO at 11/14/24 3216       Deanna Jean PA at 11/14/24 1253       Attestation signed by Brunner, Mark I, MD at 11/14/24 5931    I have reviewed this documentation and agree.                  GI Daily Progress Note  Subjective:    Chief Complaint:  Follow up pancreatitis     Feeling a little better.  Tolerated full liquid diet.   Ambulating more.   Using incentive spirometry.    On 2L O2 via nasal cannula.   Inquires about possible discharge tomorrow as it will be her birthday.       Tmax 100.6 last night     Objective:    /92 (BP Location: Right arm, Patient Position: Sitting)   Pulse 92   Temp 99 °F (37.2 °C) (Oral)   Resp 18   Ht 157.5 cm (62\")   Wt 84.4 kg (186 lb)   LMP 10/29/2024 (Approximate)   SpO2 100%   BMI 34.02 kg/m²     Physical Exam  Constitutional:       Comments: Sitting up in the chair in no acute distress    Cardiovascular:      Rate and Rhythm: Regular rhythm. Tachycardia present.   Pulmonary:      Effort: Pulmonary effort is normal.   Abdominal:      General: There is no distension.      Comments: BRUNO drain in place    Neurological:      Mental Status: She is oriented to person, place, and time.       Lab  Lab Results   Component Value Date    WBC 15.81 (H) 11/14/2024    HGB 9.6 (L) 11/14/2024    HGB 10.8 (L) 11/13/2024    HGB 11.0 (L) " "11/13/2024    MCV 92.1 11/14/2024     11/14/2024    INR 1.02 11/10/2024     Lab Results   Component Value Date    GLUCOSE 102 (H) 11/14/2024    BUN 7 11/14/2024    CREATININE 0.41 (L) 11/14/2024    BCR 17.1 11/14/2024     11/14/2024    K 3.5 11/14/2024    CO2 23.0 11/14/2024    CALCIUM 7.3 (L) 11/14/2024    ALBUMIN 2.7 (L) 11/14/2024    ALKPHOS 135 (H) 11/14/2024    BILITOT 1.1 11/14/2024    ALT 54 (H) 11/14/2024    AST 29 11/14/2024       Assessment:    Choledocholithiasis with biliary obstruction s/p ERCP on 11/9/24 with sphincterotomy and balloon sweep.     Post ERCP pancreatitis   Cholelithiasis with chronic cholecystitis s/p cholecystectomy on 11/10/24. POD # 4   SIRS secondary to pancreatitis, improving   Pleural effusions and ascites      Plan:    >> Advance diet to bland/gi soft  >> Discontinue IV LR  >> Incentive spirometry     ROXY Taylor  11/14/24  12:56 EST      Electronically signed by Brunner, Mark I, MD at 11/14/24 1751       Raheem Raza MD at 11/14/24 0703          Patient Name:  Eder Wills  YOB: 2000  [id number]    Surgery Progress Note    Date of visit: 11/14/2024      Subjective: No acute events.  Moved out of the ICU yesterday afternoon.  Feeling much better and sitting up in the bed without any obvious complaints.  Reports she slept well last night.  Still has some bloating but this is markedly improved as compared to the past several days.  Tolerated full liquid diet yesterday without any difficulty.  No nausea or vomiting.  Has had multiple loose bowel movements.  Had a Tmax of 100.6          Objective:     /87 (BP Location: Right arm, Patient Position: Lying)   Pulse 101   Temp 99 °F (37.2 °C) (Oral)   Resp 18   Ht 157.5 cm (62\")   Wt 84.4 kg (186 lb)   LMP 10/29/2024 (Approximate)   SpO2 92%   BMI 34.02 kg/m²     Intake/Output Summary (Last 24 hours) at 11/14/2024 0704  Last data filed at 11/14/2024 0253  Gross per 24 hour " "  Intake 1187 ml   Output 715 ml   Net 472 ml       GEN:   Awake, alert, in no acute distress, resting comfortably in bed   CV:   Regular rate and rhythm  L:  Symmetric expansion, not labored on room air  Abd:  Soft, appropriately tender to palpation along incisions, incisions are clean, dry, and intact, not obviously distended, BRUNO drain in the right upper quadrant with straw-colored serous ascites fluid  Ext:  No cyanosis, clubbing, or edema    Recent labs that are back at this time have been reviewed.           Assessment/ Plan:    Mrs. Wills is a 23-year-old lady without significant past medical history with choledocholithiasis      # Choledocholithiasis  # Pancreatitis  -Status post ERCP 11/9/24  -Status post Lap Pamela 11/10/24  -Labs continue to improve.  White blood cell count is 15.  Liver function test not elevated.  Hypophosphatemia noted  -Continue BRUNO drain and monitor output.  615 mL of straw-colored ascites fluid yesterday  -Advance diet as tolerated from my standpoint  -Incentive spirometer.  Encourage mobilization  -Will discontinue bowel regimen given multiple loose stools      Raheem Raza MD  11/14/2024  07:04 EST        Electronically signed by Raheem Raza MD at 11/14/24 0706       Dick Patrick MD at 11/13/24 1611          GI Daily Progress Note  Subjective:    Chief Complaint:  f/u pancreatitis    Patient reports feeling a little better today.  She is trying to get up to chair and walk frequently.  Reports she is tolerating clear liquids and would like to try and advance.     Objective:    /86   Pulse 94   Temp 98.1 °F (36.7 °C) (Oral)   Resp (!) 30   Ht 157.5 cm (62\")   Wt 84.4 kg (186 lb)   LMP 10/29/2024 (Approximate)   SpO2 100%   BMI 34.02 kg/m²     Physical Exam  Physical Exam:   General: Patient awake, sitting up at bedside   Eyes: Normal lids and lashes, no scleral icterus   Neck: Supple, normal ROM   Skin: Warm and dry, not jaundiced   Cardiovascular: Regular " rate, well-perfused extremities   Pulm: Equal expansion bilaterally, no increased WOB   Abdomen: nondistended; drain in place    Lab  Lab Results   Component Value Date    WBC 18.00 (H) 11/13/2024    HGB 10.8 (L) 11/13/2024    HGB 11.0 (L) 11/13/2024    HGB 11.0 (L) 11/12/2024    MCV 93.3 11/13/2024     11/13/2024    INR 1.02 11/10/2024       Lab Results   Component Value Date    GLUCOSE 100 (H) 11/13/2024    BUN 7 11/13/2024    CREATININE 0.42 (L) 11/13/2024    BCR 16.7 11/13/2024     (L) 11/13/2024    K 3.9 11/13/2024    CO2 23.0 11/13/2024    CALCIUM 7.8 (L) 11/13/2024    ALBUMIN 2.8 (L) 11/13/2024    ALKPHOS 126 (H) 11/13/2024    BILITOT 1.4 (H) 11/13/2024    ALT 71 (H) 11/13/2024    AST 24 11/13/2024       Assessment:  Choledocholithiasis and Cholelithiasis  Elevated LFTS and Tbili  Post-ERCP Pancreatitis (ERCP with biliary sphincterotomy and balloon sweep 11/9)  SIRS 2/2 Post-ERCP Pancreatitis    Plan:  - LFTs/tbili improved  - CT chest, abdomen and pelvis 11/11 with findings of acute pancreatitis and resultatn ascites.  RUQ surgical drain in place.  Bilateral pleural effusions.   - RUQ drain draining ascites from pancreatitis, output decreasing  - Continue pain control and anti-emetics as needed  - Bowel regimen titration as needed, had Bms today  - Remains on broad antibiotics, I have low suspicion for infected pancreatitis at this time and suspect her SIRS is secondary severe inflammation from pancreatitis   - IV LR  - Labs overall improving, continue to monitor electrolytes and replace PRN  - Continue to encourage incentive spirometry  - Continue supportive care in the ICU  - Will advance diet to full liquid this evening, supplement with boost     Dick Patrick MD  11/13/24  16:12 EST        Electronically signed by Dick Patrick MD at 11/13/24 6693       Isabel Nice MD at 11/13/24 1342          Critical Care Note     LOS: 5 days   Patient Care Team:  Eva Tovar MD as  "PCP - General (Internal Medicine)    Chief Complaint/Reason for visit:    Chief Complaint   Patient presents with    Flank Pain   Acute biliary obstruction  Status post ERCP  Status postcholecystectomy, November 10  Hypophosphatemia    Subjective       Interval History:     She is afebrile today.  She has been extremely anxious and this is caused her to be tachypneic restless.  She has pulled her IVs out.  She is afebrile with a Tmax of 99.6.  On 2 L her saturation is 98%.  Right upper quadrant drain output 725 mL yesterday, urine output 975 mL.  She has been ambulating.    Review of Systems:    All systems were reviewed and negative except as noted in subjective.    Medical history, surgical history, social history, family history reviewed    Objective     Intake/Output:    Intake/Output Summary (Last 24 hours) at 11/13/2024 1342  Last data filed at 11/13/2024 0900  Gross per 24 hour   Intake 3241 ml   Output 1025 ml   Net 2216 ml       Nutrition:  Diet: Liquid; Clear Liquid; Fluid Consistency: Thin (IDDSI 0)    Infusions:         Mechanical Ventilator Settings:                                                Telemetry: Sinus tachycardia             Vital Signs  Blood pressure 132/86, pulse 100, temperature 98.7 °F (37.1 °C), temperature source Axillary, resp. rate (!) 40, height 157.5 cm (62\"), weight 84.4 kg (186 lb), last menstrual period 10/29/2024, SpO2 98%.    Physical Exam:  General Appearance:  Young woman sitting upright in the chair, anxious breathing 40 breaths a minute.  Heart rate was initially 130 244.  Heart rate improved with metoprolol.   Head:  Atraumatic   Eyes:          Conjunctiva pink, I do not appreciate jaundice   Ears:     Throat: Oral mucosa moist   Neck: Supple, no crepitus   Back:      Lungs:   Symmetric chest expansion.  Breath sounds are clear anteriorly, diminished at the bases posteriorly    Heart:  Increased rate, regular, S1, S2 auscultated   Abdomen:   Laparoscopic incisions intact " "without bleeding.  Hypoactive bowel sounds, mildly tender, distended   Rectal:   Deferred   Extremities: Pretibial edema, nailbeds pink.  Right forearm IV site without erythema but the right hand is swollen   Pulses:    Skin: Warm and dry   Lymph nodes:    Neurologic: Patient is awake and oriented.  She is anxious and restless.  No hallucinations      Results Review:     I reviewed the patient's new clinical results.   Results from last 7 days   Lab Units 11/13/24  0610 11/12/24  1808 11/12/24  0401 11/11/24  1239   SODIUM mmol/L 134* 136 134* 132*   POTASSIUM mmol/L 3.9 4.2 3.6 4.1   CHLORIDE mmol/L 102 104 101 100   CO2 mmol/L 23.0 24.0 23.0 18.0*   BUN mg/dL 7 7 8 11   CREATININE mg/dL 0.42* 0.43* 0.51* 0.70   CALCIUM mg/dL 7.8* 7.6* 7.6* 7.7*   BILIRUBIN mg/dL 1.4*  --  1.7* 1.5*   ALK PHOS U/L 126*  --  73 137*   ALT (SGPT) U/L 71*  --  81* 136*   AST (SGOT) U/L 24  --  24 30   GLUCOSE mg/dL 100* 130* 116* 151*     Results from last 7 days   Lab Units 11/13/24  0610 11/13/24  0033 11/12/24  1808 11/12/24  1210 11/12/24  0401 11/11/24  1239   WBC 10*3/mm3 18.00*  --   --   --  23.38* 33.78*   HEMOGLOBIN g/dL 10.8* 11.0* 11.0*   < > 11.6* 15.8   HEMATOCRIT % 33.2* 33.5* 33.3*   < > 35.0 48.9*   PLATELETS 10*3/mm3 288  --   --   --  288 465*    < > = values in this interval not displayed.         Lab Results   Component Value Date    BLOODCX No growth at 24 hours 11/11/2024     No results found for: \"URINECX\"    I reviewed the patient's new imaging including images and reports.    CT ANGIOGRAM CHEST, CT ABD W CONTRAST PELVIS W WO CONTRAST    Date of Exam: 11/11/2024 12:46 PM EST    Indication: pleuritic chest pasin, tachycardia.    Comparison: CT abdomen and pelvis of 11/8/2024.    Technique: CTA of the chest was performed before and after the uneventful intravenous administration of 85 mL Isovue-370. Reconstructed coronal and sagittal images were also obtained. In addition, a 3-D volume rendered image was created " for  interpretation. Automated exposure control and iterative reconstruction methods were used.      Findings:  CT chest: There are no filling defects suspicious for pulmonary emboli. There are no enlarged mediastinal nodes. There are no hilar masses. There are small bilateral pleural effusions. There is compressive atelectasis in the lung bases. The heart size is   normal. There is no pericardial effusion.    CT abdomen and pelvis: The gallbladder has been removed. An oval nonenhancing fluid collection is identified in the gallbladder fossa measuring 6 x 2 cm. There is a surgical drain entering the right abdomen having its tip near the gallbladder fossa.  There is new moderately large amount of abdominal ascites and mild amount of pelvic ascites. There is peripancreatic soft tissue stranding and fluid. There is no evidence of a focal enhancing or encapsulated fluid collection. There is no bile duct  dilatation. There are no focal liver lesions. Spleen and adrenal glands appear normal. There are no renal lesions or hydronephrosis. Partially filled bladder appears grossly normal. There is no pelvic mass. There is no large or small bowel dilatation.  There is a small amount of free intraperitoneal air identified over the left hepatic lobe. The stomach is collapsed.   Impression:     Negative exam for pulmonary embolism. Small bilateral pleural effusions with mild compressive bibasilar atelectasis.    Findings compatible with acute pancreatitis. Moderate amount of abdominal and small amount of pelvic ascites. Small nonencapsulated fluid collection in the gallbladder fossa, status post cholecystectomy. Small amount of free air is compatible with recent   surgery. Surgical drain is in place.      Electronically Signed: Kristi Matta MD   11/11/2024 1:40 PM EST     All medications reviewed.   bisacodyl, 10 mg, Oral, Daily  docusate sodium, 100 mg, Oral, BID  FLUoxetine, 40 mg, Oral, Daily  heparin (porcine), 5,000  Units, Subcutaneous, Q8H  metoprolol tartrate, 25 mg, Oral, Q12H  mupirocin, 1 Application, Each Nare, BID  [START ON 11/14/2024] pantoprazole, 40 mg, Oral, Q AM  piperacillin-tazobactam, 3.375 g, Intravenous, Q8H  traZODone, 50 mg, Oral, Nightly          Assessment & Plan       Biliary obstruction    Calculus of gallbladder and bile duct with obstruction    Mood disorder    Obesity (BMI 30-39.9)    Post-ERCP acute pancreatitis      23-year-old woman presenting on the evening of October 8 with biliary obstruction, jaundice and pain.  She underwent endoscopic retrograde cholangiogram without obstruction, biliary sphincterotomy and balloon sweep on November 9.  She underwent laparoscopic cholecystectomy on November 10.  She developed some post ERCP pancreatitis and was placed on fluids and antiemetics.  She moved to the ICU on November 12 with tachycardia and tachypnea.  Imaging revealed a new moderately large abdominal ascites, some basilar atelectasis and effusions.  White count increased to 33 and she developed metabolic acidosis.  Lipase has improved to 137.  White blood cell count is 18, continuing to improve.  Magnesium and phosphorus have been replaced.  Hemoglobin has continued to drift slightly and is 10.8.  Yesterday hemoglobin was 11.6 and on admission hemoglobin was 16.3.  She is extremely anxious and this causes tachypnea.  She does have some pleural effusions as well as ascites.  Her home dose of Prozac has been restarted.  She received 1 dose of Valium without significant improvement.  She has been eating poorly and has not been sleeping well.    PLAN:    Monitor LFTs, lipase, H&H    Continue Zosyn    Stop IV fluids and monitor output  Replace electrolytes    Incentive spirometry  Ambulate    Clear liquid diet, advance as tolerated    Continue metoprolol for tachycardia, this may also help her anxiety  Continue home dose of Prozac  Use Valium every 6 hours as needed    Telemetry    VTE Prophylaxis:  subcutaneous heparin    Stress Ulcer Prophylaxis: Protonix    Isabel Nice MD  11/13/24  13:42 EST      Time: Critical care 35 min  I personally provided care to this critically ill patient as documented above.  Critical care time does not include time spent on separately billed procedures.  None of my critical care time was concurrent with other critical care providers.     Electronically signed by Isabel Nice MD at 11/13/24 1349       Consult Notes (last 48 hours)  Notes from 11/13/24 1039 through 11/15/24 1039   No notes of this type exist for this encounter.

## 2024-11-15 NOTE — CASE MANAGEMENT/SOCIAL WORK
Continued Stay Note  [facility]     Patient Name: Eder Wills  MRN: [id number]  Today's Date: 11/15/2024    Admit Date: 11/7/2024    Plan: Home   Discharge Plan       Row Name 11/15/24 0906       Plan    Plan Home    Patient/Family in Agreement with Plan yes    Plan Comments Patient's plan is still to return home at discharge.  No needs noted at this time.  CM will continue to follow.    Final Discharge Disposition Code 01 - home or self-care                   Discharge Codes    No documentation.                 Expected Discharge Date and Time       Expected Discharge Date Expected Discharge Time    Nov 15, 2024               Amanda Goodwin RN

## 2024-11-15 NOTE — PLAN OF CARE
Problem: Adult Inpatient Plan of Care  Goal: Plan of Care Review  Outcome: Progressing  Flowsheets (Taken 11/15/2024 0511)  Progress: improving  Plan of Care Reviewed With:   patient   parent  Goal: Patient-Specific Goal (Individualized)  Outcome: Progressing  Goal: Absence of Hospital-Acquired Illness or Injury  Outcome: Progressing  Intervention: Identify and Manage Fall Risk  Recent Flowsheet Documentation  Taken 11/15/2024 0444 by Rachana Wiggins RN  Safety Promotion/Fall Prevention:   activity supervised   assistive device/personal items within reach   safety round/check completed  Taken 11/15/2024 0250 by Rachana Wiggins RN  Safety Promotion/Fall Prevention:   activity supervised   assistive device/personal items within reach   safety round/check completed  Taken 11/15/2024 0012 by Rachana Wiggins RN  Safety Promotion/Fall Prevention:   activity supervised   assistive device/personal items within reach   safety round/check completed  Taken 11/14/2024 2246 by Rachana Wiggins RN  Safety Promotion/Fall Prevention:   activity supervised   assistive device/personal items within reach   safety round/check completed  Taken 11/14/2024 2025 by Rachana Wiggins RN  Safety Promotion/Fall Prevention:   assistive device/personal items within reach   activity supervised   clutter free environment maintained   fall prevention program maintained   lighting adjusted   muscle strengthening facilitated   nonskid shoes/slippers when out of bed   room organization consistent   safety round/check completed  Intervention: Prevent Skin Injury  Recent Flowsheet Documentation  Taken 11/15/2024 0444 by Rachana Wiggins RN  Body Position: position changed independently  Taken 11/15/2024 0250 by Rachana Wiggins RN  Body Position: sitting up in bed  Taken 11/15/2024 0012 by Rachana Wiggins RN  Body Position: position changed independently  Taken 11/14/2024 2246 by Rachana Wiggins RN  Body Position: position changed  independently  Taken 11/14/2024 2025 by Rachana Wiggins RN  Body Position: position changed independently  Skin Protection:   drying agents applied   incontinence pads utilized  Intervention: Prevent and Manage VTE (Venous Thromboembolism) Risk  Recent Flowsheet Documentation  Taken 11/14/2024 2025 by Rachana Wiggins RN  VTE Prevention/Management:   bilateral   SCDs (sequential compression devices) off   patient refused intervention  Intervention: Prevent Infection  Recent Flowsheet Documentation  Taken 11/15/2024 0444 by Rachana Wiggins RN  Infection Prevention:   hand hygiene promoted   rest/sleep promoted  Taken 11/15/2024 0250 by Rachana Wiggins RN  Infection Prevention:   hand hygiene promoted   rest/sleep promoted  Taken 11/15/2024 0012 by Rachana Wiggins RN  Infection Prevention:   hand hygiene promoted   rest/sleep promoted  Taken 11/14/2024 2246 by Rachana Wiggins RN  Infection Prevention:   hand hygiene promoted   rest/sleep promoted  Taken 11/14/2024 2025 by Rachana Wiggins RN  Infection Prevention:   hand hygiene promoted   rest/sleep promoted  Goal: Optimal Comfort and Wellbeing  Outcome: Progressing  Intervention: Provide Person-Centered Care  Recent Flowsheet Documentation  Taken 11/15/2024 0444 by Rachana Wiggins RN  Trust Relationship/Rapport: care explained  Taken 11/15/2024 0250 by Rachana Wiggins RN  Trust Relationship/Rapport: care explained  Taken 11/15/2024 0012 by Rachana Wiggins RN  Trust Relationship/Rapport: care explained  Taken 11/14/2024 2246 by Rachana Wiggins RN  Trust Relationship/Rapport: care explained  Taken 11/14/2024 2025 by Rachana Wiggins RN  Trust Relationship/Rapport:   care explained   choices provided   questions answered   questions encouraged   reassurance provided  Goal: Readiness for Transition of Care  Outcome: Progressing     Problem: Pain Acute  Goal: Optimal Pain Control and Function  Outcome: Progressing  Intervention: Optimize Psychosocial  Wellbeing  Recent Flowsheet Documentation  Taken 11/14/2024 2025 by Rachana Wiggins RN  Diversional Activities:   television   smartphone     Problem: Fall Injury Risk  Goal: Absence of Fall and Fall-Related Injury  Outcome: Progressing  Intervention: Identify and Manage Contributors  Recent Flowsheet Documentation  Taken 11/15/2024 0444 by Rachana Wiggins RN  Self-Care Promotion: independence encouraged  Taken 11/15/2024 0250 by Rachana Wiggins RN  Self-Care Promotion: independence encouraged  Taken 11/15/2024 0012 by Rachana Wiggins RN  Self-Care Promotion: independence encouraged  Taken 11/14/2024 2246 by Rachana Wiggins RN  Self-Care Promotion: independence encouraged  Taken 11/14/2024 2025 by Rachana Wiggins RN  Self-Care Promotion: independence encouraged  Intervention: Promote Injury-Free Environment  Recent Flowsheet Documentation  Taken 11/15/2024 0444 by Rachana Wiggins RN  Safety Promotion/Fall Prevention:   activity supervised   assistive device/personal items within reach   safety round/check completed  Taken 11/15/2024 0250 by Rachana Wiggins RN  Safety Promotion/Fall Prevention:   activity supervised   assistive device/personal items within reach   safety round/check completed  Taken 11/15/2024 0012 by Rachana Wiggins RN  Safety Promotion/Fall Prevention:   activity supervised   assistive device/personal items within reach   safety round/check completed  Taken 11/14/2024 2246 by Rachana Wiggins RN  Safety Promotion/Fall Prevention:   activity supervised   assistive device/personal items within reach   safety round/check completed  Taken 11/14/2024 2025 by Rachana Wiggins RN  Safety Promotion/Fall Prevention:   assistive device/personal items within reach   activity supervised   clutter free environment maintained   fall prevention program maintained   lighting adjusted   muscle strengthening facilitated   nonskid shoes/slippers when out of bed   room organization consistent   safety  round/check completed     Problem: Sepsis/Septic Shock  Goal: Optimal Coping  Outcome: Progressing  Intervention: Support Patient and Family Response  Recent Flowsheet Documentation  Taken 11/14/2024 2025 by Rachana Wiggins RN  Family/Support System Care:   self-care encouraged   support provided  Goal: Absence of Bleeding  Outcome: Progressing  Intervention: Monitor and Manage Bleeding  Recent Flowsheet Documentation  Taken 11/14/2024 2025 by Rachana Wiggins RN  Bleeding Management: dressing monitored  Goal: Blood Glucose Level Within Target Range  Outcome: Progressing  Goal: Absence of Infection Signs and Symptoms  Outcome: Progressing  Intervention: Initiate Sepsis Management  Recent Flowsheet Documentation  Taken 11/15/2024 0444 by Rachana Wiggins RN  Infection Prevention:   hand hygiene promoted   rest/sleep promoted  Taken 11/15/2024 0250 by Rachana Wgigins RN  Infection Prevention:   hand hygiene promoted   rest/sleep promoted  Taken 11/15/2024 0012 by Rachana Wiggins RN  Infection Prevention:   hand hygiene promoted   rest/sleep promoted  Taken 11/14/2024 2246 by Rachana Wiggins RN  Infection Prevention:   hand hygiene promoted   rest/sleep promoted  Taken 11/14/2024 2025 by Rachana Wiggins RN  Infection Management: aseptic technique maintained  Infection Prevention:   hand hygiene promoted   rest/sleep promoted  Intervention: Promote Recovery  Recent Flowsheet Documentation  Taken 11/15/2024 0444 by Rachana Wiggins RN  Activity Management: activity encouraged  Taken 11/15/2024 0250 by Rachana Wiggins RN  Activity Management: ambulated to bathroom  Taken 11/15/2024 0012 by Rachana Wiggins RN  Activity Management: activity encouraged  Taken 11/14/2024 2246 by Rachana Wiggins RN  Activity Management: activity encouraged  Taken 11/14/2024 2025 by Rachana Wiggins RN  Activity Management: activity encouraged  Goal: Optimal Nutrition Delivery  Outcome: Progressing     Problem: Skin Injury Risk  Increased  Goal: Skin Health and Integrity  Outcome: Progressing  Intervention: Optimize Skin Protection  Recent Flowsheet Documentation  Taken 11/15/2024 0444 by Rachana Wiggins RN  Activity Management: activity encouraged  Head of Bed (HOB) Positioning: HOB elevated  Taken 11/15/2024 0250 by Rachana Wiggins RN  Activity Management: ambulated to bathroom  Head of Bed (HOB) Positioning: HOB elevated  Taken 11/15/2024 0012 by Rachana Wiggins RN  Activity Management: activity encouraged  Head of Bed (HOB) Positioning: HOB elevated  Taken 11/14/2024 2246 by Rachana Wiggins RN  Activity Management: activity encouraged  Head of Bed (HOB) Positioning: HOB elevated  Taken 11/14/2024 2025 by Rachana Wiggins RN  Activity Management: activity encouraged  Pressure Reduction Techniques: frequent weight shift encouraged  Head of Bed (HOB) Positioning: HOB elevated  Pressure Reduction Devices: pressure-redistributing mattress utilized  Skin Protection:   drying agents applied   incontinence pads utilized   Goal Outcome Evaluation:  Plan of Care Reviewed With: patient, parent      Pt currently in bed resting quietly. Complaints of pain to incision site earlier in shift alleviated with MAR. Pt up with standby assist. Dressing to abdomen CDI. BRUNO in place with 125ml out. IV ATB administered as ordered. VSS. Plans to discharge home when ready. No other observations at this time, call bell in reach.  Progress: improving

## 2024-11-16 LAB
BACTERIA SPEC AEROBE CULT: NORMAL
BACTERIA SPEC AEROBE CULT: NORMAL

## 2024-11-16 NOTE — OUTREACH NOTE
Prep Survey      Flowsheet Row Responses   [facility] facility patient discharged from? [facility]   Is LACE score < 7 ? No   Eligibility Readm Mgmt   Discharge diagnosis Biliary obstruction   Does the patient have one of the following disease processes/diagnoses(primary or secondary)? Other   Does the patient have Home health ordered? No   Is there a DME ordered? No   Prep survey completed? Yes            Vickie ZABALA - Registered Nurse

## 2024-11-18 ENCOUNTER — HOSPITAL ENCOUNTER (INPATIENT)
Facility: HOSPITAL | Age: 24
LOS: 2 days | Discharge: HOME | End: 2024-11-20
Attending: EMERGENCY MEDICINE | Admitting: PEDIATRICS
Payer: COMMERCIAL

## 2024-11-18 ENCOUNTER — APPOINTMENT (OUTPATIENT)
Dept: GENERAL RADIOLOGY | Facility: HOSPITAL | Age: 24
End: 2024-11-18
Payer: COMMERCIAL

## 2024-11-18 ENCOUNTER — APPOINTMENT (OUTPATIENT)
Dept: CT IMAGING | Facility: HOSPITAL | Age: 24
End: 2024-11-18
Payer: COMMERCIAL

## 2024-11-18 ENCOUNTER — READMISSION MANAGEMENT (OUTPATIENT)
Dept: CALL CENTER | Facility: HOSPITAL | Age: 24
End: 2024-11-18
Payer: COMMERCIAL

## 2024-11-18 ENCOUNTER — APPOINTMENT (OUTPATIENT)
Dept: CARDIOLOGY | Facility: HOSPITAL | Age: 24
End: 2024-11-18
Payer: COMMERCIAL

## 2024-11-18 ENCOUNTER — APPOINTMENT (OUTPATIENT)
Dept: ULTRASOUND IMAGING | Facility: HOSPITAL | Age: 24
End: 2024-11-18
Payer: COMMERCIAL

## 2024-11-18 DIAGNOSIS — K85.80 OTHER ACUTE PANCREATITIS, UNSPECIFIED COMPLICATION STATUS: Primary | ICD-10-CM

## 2024-11-18 DIAGNOSIS — K91.89 POST-ERCP ACUTE PANCREATITIS: ICD-10-CM

## 2024-11-18 DIAGNOSIS — K85.90 POST-ERCP ACUTE PANCREATITIS: ICD-10-CM

## 2024-11-18 DIAGNOSIS — D72.829 LEUKOCYTOSIS, UNSPECIFIED TYPE: ICD-10-CM

## 2024-11-18 PROBLEM — R60.0 LOWER EXTREMITY EDEMA: Status: ACTIVE | Noted: 2024-11-18

## 2024-11-18 PROBLEM — Z90.49 STATUS POST CHOLECYSTECTOMY: Status: ACTIVE | Noted: 2024-11-18

## 2024-11-18 PROBLEM — R10.9 LEFT FLANK PAIN: Status: ACTIVE | Noted: 2024-11-18

## 2024-11-18 PROBLEM — J18.9 PNEUMONIA OF BOTH LOWER LOBES: Status: ACTIVE | Noted: 2024-11-18

## 2024-11-18 LAB
ALBUMIN SERPL-MCNC: 2.7 G/DL (ref 3.5–5.2)
ALBUMIN SERPL-MCNC: 2.8 G/DL (ref 3.5–5.2)
ALBUMIN/GLOB SERPL: 0.9 G/DL
ALBUMIN/GLOB SERPL: 1.3 G/DL
ALP SERPL-CCNC: 157 U/L (ref 39–117)
ALP SERPL-CCNC: 195 U/L (ref 39–117)
ALT SERPL W P-5'-P-CCNC: 29 U/L (ref 1–33)
ALT SERPL W P-5'-P-CCNC: 31 U/L (ref 1–33)
ANION GAP SERPL CALCULATED.3IONS-SCNC: 11 MMOL/L (ref 5–15)
ANION GAP SERPL CALCULATED.3IONS-SCNC: 9 MMOL/L (ref 5–15)
AST SERPL-CCNC: 18 U/L (ref 1–32)
AST SERPL-CCNC: 32 U/L (ref 1–32)
B PARAPERT DNA SPEC QL NAA+PROBE: NOT DETECTED
B PERT DNA SPEC QL NAA+PROBE: NOT DETECTED
B-HCG UR QL: NEGATIVE
BASOPHILS # BLD AUTO: 0.08 10*3/MM3 (ref 0–0.2)
BASOPHILS # BLD MANUAL: 0 10*3/MM3 (ref 0–0.2)
BASOPHILS NFR BLD AUTO: 0.3 % (ref 0–1.5)
BASOPHILS NFR BLD MANUAL: 0 % (ref 0–1.5)
BH CV LOWER VASCULAR LEFT COMMON FEMORAL AUGMENT: NORMAL
BH CV LOWER VASCULAR LEFT COMMON FEMORAL COMPRESS: NORMAL
BH CV LOWER VASCULAR LEFT COMMON FEMORAL PHASIC: NORMAL
BH CV LOWER VASCULAR LEFT COMMON FEMORAL SPONT: NORMAL
BH CV LOWER VASCULAR LEFT DISTAL FEMORAL AUGMENT: NORMAL
BH CV LOWER VASCULAR LEFT DISTAL FEMORAL COMPRESS: NORMAL
BH CV LOWER VASCULAR LEFT DISTAL FEMORAL PHASIC: NORMAL
BH CV LOWER VASCULAR LEFT DISTAL FEMORAL SPONT: NORMAL
BH CV LOWER VASCULAR LEFT GASTRONEMIUS COMPRESS: NORMAL
BH CV LOWER VASCULAR LEFT GREATER SAPH AK COMPRESS: NORMAL
BH CV LOWER VASCULAR LEFT GREATER SAPH BK COMPRESS: NORMAL
BH CV LOWER VASCULAR LEFT LESSER SAPH COMPRESS: NORMAL
BH CV LOWER VASCULAR LEFT MID FEMORAL AUGMENT: NORMAL
BH CV LOWER VASCULAR LEFT MID FEMORAL COMPRESS: NORMAL
BH CV LOWER VASCULAR LEFT MID FEMORAL PHASIC: NORMAL
BH CV LOWER VASCULAR LEFT MID FEMORAL SPONT: NORMAL
BH CV LOWER VASCULAR LEFT PERONEAL AUGMENT: NORMAL
BH CV LOWER VASCULAR LEFT PERONEAL COMPRESS: NORMAL
BH CV LOWER VASCULAR LEFT POPLITEAL AUGMENT: NORMAL
BH CV LOWER VASCULAR LEFT POPLITEAL COMPRESS: NORMAL
BH CV LOWER VASCULAR LEFT POPLITEAL PHASIC: NORMAL
BH CV LOWER VASCULAR LEFT POPLITEAL SPONT: NORMAL
BH CV LOWER VASCULAR LEFT POSTERIOR TIBIAL AUGMENT: NORMAL
BH CV LOWER VASCULAR LEFT POSTERIOR TIBIAL COMPRESS: NORMAL
BH CV LOWER VASCULAR LEFT PROFUNDA FEMORAL AUGMENT: NORMAL
BH CV LOWER VASCULAR LEFT PROFUNDA FEMORAL PHASIC: NORMAL
BH CV LOWER VASCULAR LEFT PROFUNDA FEMORAL SPONT: NORMAL
BH CV LOWER VASCULAR LEFT PROXIMAL FEMORAL AUGMENT: NORMAL
BH CV LOWER VASCULAR LEFT PROXIMAL FEMORAL COMPRESS: NORMAL
BH CV LOWER VASCULAR LEFT PROXIMAL FEMORAL PHASIC: NORMAL
BH CV LOWER VASCULAR LEFT PROXIMAL FEMORAL SPONT: NORMAL
BH CV LOWER VASCULAR LEFT SAPHENOFEMORAL JUNCTION AUGMENT: NORMAL
BH CV LOWER VASCULAR LEFT SAPHENOFEMORAL JUNCTION COMPRESS: NORMAL
BH CV LOWER VASCULAR LEFT SAPHENOFEMORAL JUNCTION PHASIC: NORMAL
BH CV LOWER VASCULAR LEFT SAPHENOFEMORAL JUNCTION SPONT: NORMAL
BH CV LOWER VASCULAR RIGHT COMMON FEMORAL AUGMENT: NORMAL
BH CV LOWER VASCULAR RIGHT COMMON FEMORAL COMPRESS: NORMAL
BH CV LOWER VASCULAR RIGHT COMMON FEMORAL PHASIC: NORMAL
BH CV LOWER VASCULAR RIGHT COMMON FEMORAL SPONT: NORMAL
BH CV LOWER VASCULAR RIGHT DISTAL FEMORAL AUGMENT: NORMAL
BH CV LOWER VASCULAR RIGHT DISTAL FEMORAL COMPRESS: NORMAL
BH CV LOWER VASCULAR RIGHT DISTAL FEMORAL PHASIC: NORMAL
BH CV LOWER VASCULAR RIGHT DISTAL FEMORAL SPONT: NORMAL
BH CV LOWER VASCULAR RIGHT GASTRONEMIUS COMPRESS: NORMAL
BH CV LOWER VASCULAR RIGHT GREATER SAPH AK COMPRESS: NORMAL
BH CV LOWER VASCULAR RIGHT GREATER SAPH BK COMPRESS: NORMAL
BH CV LOWER VASCULAR RIGHT LESSER SAPH COMPRESS: NORMAL
BH CV LOWER VASCULAR RIGHT MID FEMORAL AUGMENT: NORMAL
BH CV LOWER VASCULAR RIGHT MID FEMORAL COMPRESS: NORMAL
BH CV LOWER VASCULAR RIGHT MID FEMORAL PHASIC: NORMAL
BH CV LOWER VASCULAR RIGHT MID FEMORAL SPONT: NORMAL
BH CV LOWER VASCULAR RIGHT PERONEAL AUGMENT: NORMAL
BH CV LOWER VASCULAR RIGHT PERONEAL COMPRESS: NORMAL
BH CV LOWER VASCULAR RIGHT POPLITEAL AUGMENT: NORMAL
BH CV LOWER VASCULAR RIGHT POPLITEAL COMPRESS: NORMAL
BH CV LOWER VASCULAR RIGHT POPLITEAL PHASIC: NORMAL
BH CV LOWER VASCULAR RIGHT POPLITEAL SPONT: NORMAL
BH CV LOWER VASCULAR RIGHT POSTERIOR TIBIAL AUGMENT: NORMAL
BH CV LOWER VASCULAR RIGHT POSTERIOR TIBIAL COMPRESS: NORMAL
BH CV LOWER VASCULAR RIGHT PROFUNDA FEMORAL AUGMENT: NORMAL
BH CV LOWER VASCULAR RIGHT PROFUNDA FEMORAL PHASIC: NORMAL
BH CV LOWER VASCULAR RIGHT PROFUNDA FEMORAL SPONT: NORMAL
BH CV LOWER VASCULAR RIGHT PROXIMAL FEMORAL AUGMENT: NORMAL
BH CV LOWER VASCULAR RIGHT PROXIMAL FEMORAL COMPRESS: NORMAL
BH CV LOWER VASCULAR RIGHT PROXIMAL FEMORAL PHASIC: NORMAL
BH CV LOWER VASCULAR RIGHT PROXIMAL FEMORAL SPONT: NORMAL
BH CV LOWER VASCULAR RIGHT SAPHENOFEMORAL JUNCTION AUGMENT: NORMAL
BH CV LOWER VASCULAR RIGHT SAPHENOFEMORAL JUNCTION COMPRESS: NORMAL
BH CV LOWER VASCULAR RIGHT SAPHENOFEMORAL JUNCTION PHASIC: NORMAL
BH CV LOWER VASCULAR RIGHT SAPHENOFEMORAL JUNCTION SPONT: NORMAL
BILIRUB SERPL-MCNC: 0.7 MG/DL (ref 0–1.2)
BILIRUB SERPL-MCNC: 0.7 MG/DL (ref 0–1.2)
BILIRUB UR QL STRIP: NEGATIVE
BUN SERPL-MCNC: 5 MG/DL (ref 6–20)
BUN SERPL-MCNC: 5 MG/DL (ref 6–20)
BUN/CREAT SERPL: 10.6 (ref 7–25)
BUN/CREAT SERPL: 11.1 (ref 7–25)
C PNEUM DNA NPH QL NAA+NON-PROBE: NOT DETECTED
CALCIUM SPEC-SCNC: 8.2 MG/DL (ref 8.6–10.5)
CALCIUM SPEC-SCNC: 8.2 MG/DL (ref 8.6–10.5)
CHLORIDE SERPL-SCNC: 101 MMOL/L (ref 98–107)
CHLORIDE SERPL-SCNC: 103 MMOL/L (ref 98–107)
CLARITY UR: CLEAR
CO2 SERPL-SCNC: 24 MMOL/L (ref 22–29)
CO2 SERPL-SCNC: 25 MMOL/L (ref 22–29)
COLOR UR: YELLOW
CREAT SERPL-MCNC: 0.45 MG/DL (ref 0.57–1)
CREAT SERPL-MCNC: 0.47 MG/DL (ref 0.57–1)
CRP SERPL-MCNC: 30.11 MG/DL (ref 0–0.5)
D-LACTATE SERPL-SCNC: 0.9 MMOL/L (ref 0.5–2)
DEPRECATED RDW RBC AUTO: 48.9 FL (ref 37–54)
DEPRECATED RDW RBC AUTO: 49.1 FL (ref 37–54)
EGFRCR SERPLBLD CKD-EPI 2021: 138.2 ML/MIN/1.73
EGFRCR SERPLBLD CKD-EPI 2021: 139.7 ML/MIN/1.73
EOSINOPHIL # BLD AUTO: 0.15 10*3/MM3 (ref 0–0.4)
EOSINOPHIL # BLD MANUAL: 0 10*3/MM3 (ref 0–0.4)
EOSINOPHIL NFR BLD AUTO: 0.5 % (ref 0.3–6.2)
EOSINOPHIL NFR BLD MANUAL: 0 % (ref 0.3–6.2)
ERYTHROCYTE [DISTWIDTH] IN BLOOD BY AUTOMATED COUNT: 14.2 % (ref 12.3–15.4)
ERYTHROCYTE [DISTWIDTH] IN BLOOD BY AUTOMATED COUNT: 14.5 % (ref 12.3–15.4)
EXPIRATION DATE: NORMAL
FLUAV SUBTYP SPEC NAA+PROBE: NOT DETECTED
FLUBV RNA ISLT QL NAA+PROBE: NOT DETECTED
GLOBULIN UR ELPH-MCNC: 2.1 GM/DL
GLOBULIN UR ELPH-MCNC: 3 GM/DL
GLUCOSE SERPL-MCNC: 100 MG/DL (ref 65–99)
GLUCOSE SERPL-MCNC: 107 MG/DL (ref 65–99)
GLUCOSE UR STRIP-MCNC: NEGATIVE MG/DL
HADV DNA SPEC NAA+PROBE: NOT DETECTED
HCG INTACT+B SERPL-ACNC: <0.1 MIU/ML
HCOV 229E RNA SPEC QL NAA+PROBE: NOT DETECTED
HCOV HKU1 RNA SPEC QL NAA+PROBE: NOT DETECTED
HCOV NL63 RNA SPEC QL NAA+PROBE: NOT DETECTED
HCOV OC43 RNA SPEC QL NAA+PROBE: NOT DETECTED
HCT VFR BLD AUTO: 29.7 % (ref 34–46.6)
HCT VFR BLD AUTO: 30.3 % (ref 34–46.6)
HGB BLD-MCNC: 9.4 G/DL (ref 12–15.9)
HGB BLD-MCNC: 9.9 G/DL (ref 12–15.9)
HGB UR QL STRIP.AUTO: NEGATIVE
HMPV RNA NPH QL NAA+NON-PROBE: NOT DETECTED
HOLD SPECIMEN: NORMAL
HPIV1 RNA ISLT QL NAA+PROBE: NOT DETECTED
HPIV2 RNA SPEC QL NAA+PROBE: NOT DETECTED
HPIV3 RNA NPH QL NAA+PROBE: NOT DETECTED
HPIV4 P GENE NPH QL NAA+PROBE: NOT DETECTED
IMM GRANULOCYTES # BLD AUTO: 1.68 10*3/MM3 (ref 0–0.05)
IMM GRANULOCYTES NFR BLD AUTO: 5.9 % (ref 0–0.5)
INTERNAL NEGATIVE CONTROL: NORMAL
INTERNAL POSITIVE CONTROL: NORMAL
KETONES UR QL STRIP: NEGATIVE
L PNEUMO1 AG UR QL IA: NEGATIVE
LEUKOCYTE ESTERASE UR QL STRIP.AUTO: NEGATIVE
LIPASE SERPL-CCNC: 76 U/L (ref 13–60)
LYMPHOCYTES # BLD AUTO: 2.5 10*3/MM3 (ref 0.7–3.1)
LYMPHOCYTES # BLD MANUAL: 1.46 10*3/MM3 (ref 0.7–3.1)
LYMPHOCYTES NFR BLD AUTO: 8.7 % (ref 19.6–45.3)
LYMPHOCYTES NFR BLD MANUAL: 2 % (ref 5–12)
Lab: NORMAL
M PNEUMO IGG SER IA-ACNC: NOT DETECTED
MAGNESIUM SERPL-MCNC: 1.8 MG/DL (ref 1.6–2.6)
MAGNESIUM SERPL-MCNC: 1.8 MG/DL (ref 1.6–2.6)
MCH RBC QN AUTO: 29.5 PG (ref 26.6–33)
MCH RBC QN AUTO: 30.2 PG (ref 26.6–33)
MCHC RBC AUTO-ENTMCNC: 31.6 G/DL (ref 31.5–35.7)
MCHC RBC AUTO-ENTMCNC: 32.7 G/DL (ref 31.5–35.7)
MCV RBC AUTO: 92.4 FL (ref 79–97)
MCV RBC AUTO: 93.1 FL (ref 79–97)
MONOCYTES # BLD AUTO: 1.97 10*3/MM3 (ref 0.1–0.9)
MONOCYTES # BLD: 0.49 10*3/MM3 (ref 0.1–0.9)
MONOCYTES NFR BLD AUTO: 6.9 % (ref 5–12)
MRSA DNA SPEC QL NAA+PROBE: NEGATIVE
NEUTROPHILS # BLD AUTO: 22.46 10*3/MM3 (ref 1.7–7)
NEUTROPHILS NFR BLD AUTO: 22.25 10*3/MM3 (ref 1.7–7)
NEUTROPHILS NFR BLD AUTO: 77.7 % (ref 42.7–76)
NEUTROPHILS NFR BLD MANUAL: 75 % (ref 42.7–76)
NEUTS BAND NFR BLD MANUAL: 17 % (ref 0–5)
NITRITE UR QL STRIP: NEGATIVE
NRBC BLD AUTO-RTO: 0 /100 WBC (ref 0–0.2)
NRBC SPEC MANUAL: 0 /100 WBC (ref 0–0.2)
PH UR STRIP.AUTO: 7 [PH] (ref 5–8)
PHOSPHATE SERPL-MCNC: 4.2 MG/DL (ref 2.5–4.5)
PLAT MORPH BLD: NORMAL
PLATELET # BLD AUTO: 449 10*3/MM3 (ref 140–450)
PLATELET # BLD AUTO: 486 10*3/MM3 (ref 140–450)
PMV BLD AUTO: 9.4 FL (ref 6–12)
PMV BLD AUTO: 9.7 FL (ref 6–12)
POTASSIUM SERPL-SCNC: 4.1 MMOL/L (ref 3.5–5.2)
POTASSIUM SERPL-SCNC: 4.1 MMOL/L (ref 3.5–5.2)
PROCALCITONIN SERPL-MCNC: 0.2 NG/ML (ref 0–0.25)
PROT SERPL-MCNC: 4.8 G/DL (ref 6–8.5)
PROT SERPL-MCNC: 5.8 G/DL (ref 6–8.5)
PROT UR QL STRIP: NEGATIVE
RBC # BLD AUTO: 3.19 10*6/MM3 (ref 3.77–5.28)
RBC # BLD AUTO: 3.28 10*6/MM3 (ref 3.77–5.28)
RBC MORPH BLD: NORMAL
RHINOVIRUS RNA SPEC NAA+PROBE: DETECTED
RSV RNA NPH QL NAA+NON-PROBE: NOT DETECTED
S PNEUM AG SPEC QL LA: NEGATIVE
SARS-COV-2 RNA NPH QL NAA+NON-PROBE: NOT DETECTED
SODIUM SERPL-SCNC: 136 MMOL/L (ref 136–145)
SODIUM SERPL-SCNC: 137 MMOL/L (ref 136–145)
SP GR UR STRIP: 1.01 (ref 1–1.03)
TRIGL SERPL-MCNC: 151 MG/DL (ref 0–150)
UROBILINOGEN UR QL STRIP: NORMAL
VARIANT LYMPHS NFR BLD MANUAL: 6 % (ref 19.6–45.3)
WBC MORPH BLD: NORMAL
WBC NRBC COR # BLD AUTO: 24.41 10*3/MM3 (ref 3.4–10.8)
WBC NRBC COR # BLD AUTO: 28.63 10*3/MM3 (ref 3.4–10.8)
WHOLE BLOOD HOLD COAG: NORMAL
WHOLE BLOOD HOLD SPECIMEN: NORMAL

## 2024-11-18 PROCEDURE — 94799 UNLISTED PULMONARY SVC/PX: CPT

## 2024-11-18 PROCEDURE — 83690 ASSAY OF LIPASE: CPT | Performed by: EMERGENCY MEDICINE

## 2024-11-18 PROCEDURE — 36415 COLL VENOUS BLD VENIPUNCTURE: CPT

## 2024-11-18 PROCEDURE — 99285 EMERGENCY DEPT VISIT HI MDM: CPT

## 2024-11-18 PROCEDURE — 93970 EXTREMITY STUDY: CPT

## 2024-11-18 PROCEDURE — 84702 CHORIONIC GONADOTROPIN TEST: CPT | Performed by: EMERGENCY MEDICINE

## 2024-11-18 PROCEDURE — 74177 CT ABD & PELVIS W/CONTRAST: CPT

## 2024-11-18 PROCEDURE — 84100 ASSAY OF PHOSPHORUS: CPT | Performed by: NURSE PRACTITIONER

## 2024-11-18 PROCEDURE — 80053 COMPREHEN METABOLIC PANEL: CPT | Performed by: EMERGENCY MEDICINE

## 2024-11-18 PROCEDURE — 25510000001 IOPAMIDOL 61 % SOLUTION: Performed by: EMERGENCY MEDICINE

## 2024-11-18 PROCEDURE — 84145 PROCALCITONIN (PCT): CPT | Performed by: INTERNAL MEDICINE

## 2024-11-18 PROCEDURE — 0202U NFCT DS 22 TRGT SARS-COV-2: CPT | Performed by: NURSE PRACTITIONER

## 2024-11-18 PROCEDURE — 85025 COMPLETE CBC W/AUTO DIFF WBC: CPT | Performed by: EMERGENCY MEDICINE

## 2024-11-18 PROCEDURE — 85025 COMPLETE CBC W/AUTO DIFF WBC: CPT | Performed by: NURSE PRACTITIONER

## 2024-11-18 PROCEDURE — 87449 NOS EACH ORGANISM AG IA: CPT | Performed by: NURSE PRACTITIONER

## 2024-11-18 PROCEDURE — 87040 BLOOD CULTURE FOR BACTERIA: CPT | Performed by: EMERGENCY MEDICINE

## 2024-11-18 PROCEDURE — 85007 BL SMEAR W/DIFF WBC COUNT: CPT | Performed by: NURSE PRACTITIONER

## 2024-11-18 PROCEDURE — 87641 MR-STAPH DNA AMP PROBE: CPT | Performed by: NURSE PRACTITIONER

## 2024-11-18 PROCEDURE — 94640 AIRWAY INHALATION TREATMENT: CPT

## 2024-11-18 PROCEDURE — 83605 ASSAY OF LACTIC ACID: CPT | Performed by: EMERGENCY MEDICINE

## 2024-11-18 PROCEDURE — 93970 EXTREMITY STUDY: CPT | Performed by: INTERNAL MEDICINE

## 2024-11-18 PROCEDURE — 25010000002 PIPERACILLIN SOD-TAZOBACTAM PER 1 G: Performed by: NURSE PRACTITIONER

## 2024-11-18 PROCEDURE — 86140 C-REACTIVE PROTEIN: CPT | Performed by: INTERNAL MEDICINE

## 2024-11-18 PROCEDURE — 71045 X-RAY EXAM CHEST 1 VIEW: CPT

## 2024-11-18 PROCEDURE — 25010000002 MORPHINE PER 10 MG: Performed by: EMERGENCY MEDICINE

## 2024-11-18 PROCEDURE — 76604 US EXAM CHEST: CPT

## 2024-11-18 PROCEDURE — 81003 URINALYSIS AUTO W/O SCOPE: CPT | Performed by: EMERGENCY MEDICINE

## 2024-11-18 PROCEDURE — 84478 ASSAY OF TRIGLYCERIDES: CPT | Performed by: EMERGENCY MEDICINE

## 2024-11-18 PROCEDURE — 83735 ASSAY OF MAGNESIUM: CPT | Performed by: NURSE PRACTITIONER

## 2024-11-18 PROCEDURE — 99222 1ST HOSP IP/OBS MODERATE 55: CPT | Performed by: INTERNAL MEDICINE

## 2024-11-18 PROCEDURE — 25010000002 KETOROLAC TROMETHAMINE PER 15 MG: Performed by: EMERGENCY MEDICINE

## 2024-11-18 PROCEDURE — 81025 URINE PREGNANCY TEST: CPT | Performed by: EMERGENCY MEDICINE

## 2024-11-18 PROCEDURE — 80053 COMPREHEN METABOLIC PANEL: CPT | Performed by: NURSE PRACTITIONER

## 2024-11-18 RX ORDER — HYDROMORPHONE HYDROCHLORIDE 1 MG/ML
0.5 INJECTION, SOLUTION INTRAMUSCULAR; INTRAVENOUS; SUBCUTANEOUS
Status: DISCONTINUED | OUTPATIENT
Start: 2024-11-18 | End: 2024-11-20 | Stop reason: HOSPADM

## 2024-11-18 RX ORDER — BISACODYL 10 MG
10 SUPPOSITORY, RECTAL RECTAL DAILY PRN
Status: DISCONTINUED | OUTPATIENT
Start: 2024-11-18 | End: 2024-11-20 | Stop reason: HOSPADM

## 2024-11-18 RX ORDER — ACETAMINOPHEN 160 MG/5ML
650 SOLUTION ORAL EVERY 4 HOURS PRN
Status: DISCONTINUED | OUTPATIENT
Start: 2024-11-18 | End: 2024-11-20 | Stop reason: HOSPADM

## 2024-11-18 RX ORDER — NALOXONE HCL 0.4 MG/ML
0.4 VIAL (ML) INJECTION
Status: DISCONTINUED | OUTPATIENT
Start: 2024-11-18 | End: 2024-11-20 | Stop reason: HOSPADM

## 2024-11-18 RX ORDER — KETOROLAC TROMETHAMINE 15 MG/ML
15 INJECTION, SOLUTION INTRAMUSCULAR; INTRAVENOUS ONCE
Status: COMPLETED | OUTPATIENT
Start: 2024-11-18 | End: 2024-11-18

## 2024-11-18 RX ORDER — POLYETHYLENE GLYCOL 3350 17 G/17G
17 POWDER, FOR SOLUTION ORAL DAILY PRN
Status: DISCONTINUED | OUTPATIENT
Start: 2024-11-18 | End: 2024-11-20 | Stop reason: HOSPADM

## 2024-11-18 RX ORDER — SODIUM CHLORIDE 0.9 % (FLUSH) 0.9 %
10 SYRINGE (ML) INJECTION AS NEEDED
Status: DISCONTINUED | OUTPATIENT
Start: 2024-11-18 | End: 2024-11-20 | Stop reason: HOSPADM

## 2024-11-18 RX ORDER — ACETAMINOPHEN 325 MG/1
650 TABLET ORAL EVERY 4 HOURS PRN
Status: DISCONTINUED | OUTPATIENT
Start: 2024-11-18 | End: 2024-11-20 | Stop reason: HOSPADM

## 2024-11-18 RX ORDER — AMOXICILLIN 250 MG
2 CAPSULE ORAL 2 TIMES DAILY PRN
Status: DISCONTINUED | OUTPATIENT
Start: 2024-11-18 | End: 2024-11-20 | Stop reason: HOSPADM

## 2024-11-18 RX ORDER — MORPHINE SULFATE 4 MG/ML
4 INJECTION, SOLUTION INTRAMUSCULAR; INTRAVENOUS ONCE
Status: COMPLETED | OUTPATIENT
Start: 2024-11-18 | End: 2024-11-18

## 2024-11-18 RX ORDER — ACETAMINOPHEN 650 MG/1
650 SUPPOSITORY RECTAL EVERY 4 HOURS PRN
Status: DISCONTINUED | OUTPATIENT
Start: 2024-11-18 | End: 2024-11-20 | Stop reason: HOSPADM

## 2024-11-18 RX ORDER — SODIUM CHLORIDE 9 MG/ML
10 INJECTION, SOLUTION INTRAMUSCULAR; INTRAVENOUS; SUBCUTANEOUS AS NEEDED
Status: DISCONTINUED | OUTPATIENT
Start: 2024-11-18 | End: 2024-11-20 | Stop reason: HOSPADM

## 2024-11-18 RX ORDER — NITROGLYCERIN 0.4 MG/1
0.4 TABLET SUBLINGUAL
Status: DISCONTINUED | OUTPATIENT
Start: 2024-11-18 | End: 2024-11-20 | Stop reason: HOSPADM

## 2024-11-18 RX ORDER — LIDOCAINE 4 G/G
1 PATCH TOPICAL
Status: DISCONTINUED | OUTPATIENT
Start: 2024-11-18 | End: 2024-11-20 | Stop reason: HOSPADM

## 2024-11-18 RX ORDER — HYDROCODONE BITARTRATE AND ACETAMINOPHEN 5; 325 MG/1; MG/1
1 TABLET ORAL EVERY 6 HOURS PRN
Status: DISCONTINUED | OUTPATIENT
Start: 2024-11-18 | End: 2024-11-20 | Stop reason: HOSPADM

## 2024-11-18 RX ORDER — ONDANSETRON 4 MG/1
4 TABLET, ORALLY DISINTEGRATING ORAL EVERY 6 HOURS PRN
Status: DISCONTINUED | OUTPATIENT
Start: 2024-11-18 | End: 2024-11-20 | Stop reason: HOSPADM

## 2024-11-18 RX ORDER — SODIUM CHLORIDE 0.9 % (FLUSH) 0.9 %
10 SYRINGE (ML) INJECTION EVERY 12 HOURS SCHEDULED
Status: DISCONTINUED | OUTPATIENT
Start: 2024-11-18 | End: 2024-11-20 | Stop reason: HOSPADM

## 2024-11-18 RX ORDER — IPRATROPIUM BROMIDE AND ALBUTEROL SULFATE 2.5; .5 MG/3ML; MG/3ML
3 SOLUTION RESPIRATORY (INHALATION)
Status: DISCONTINUED | OUTPATIENT
Start: 2024-11-18 | End: 2024-11-20

## 2024-11-18 RX ORDER — BISACODYL 5 MG/1
5 TABLET, DELAYED RELEASE ORAL DAILY PRN
Status: DISCONTINUED | OUTPATIENT
Start: 2024-11-18 | End: 2024-11-20 | Stop reason: HOSPADM

## 2024-11-18 RX ORDER — ONDANSETRON 2 MG/ML
4 INJECTION INTRAMUSCULAR; INTRAVENOUS EVERY 6 HOURS PRN
Status: DISCONTINUED | OUTPATIENT
Start: 2024-11-18 | End: 2024-11-20 | Stop reason: HOSPADM

## 2024-11-18 RX ORDER — IOPAMIDOL 612 MG/ML
80 INJECTION, SOLUTION INTRAVASCULAR
Status: COMPLETED | OUTPATIENT
Start: 2024-11-18 | End: 2024-11-18

## 2024-11-18 RX ORDER — SODIUM CHLORIDE 9 MG/ML
40 INJECTION, SOLUTION INTRAVENOUS AS NEEDED
Status: DISCONTINUED | OUTPATIENT
Start: 2024-11-18 | End: 2024-11-20 | Stop reason: HOSPADM

## 2024-11-18 RX ORDER — FAMOTIDINE 20 MG/1
40 TABLET, FILM COATED ORAL DAILY
Status: DISCONTINUED | OUTPATIENT
Start: 2024-11-18 | End: 2024-11-20 | Stop reason: HOSPADM

## 2024-11-18 RX ADMIN — IOPAMIDOL 80 ML: 612 INJECTION, SOLUTION INTRAVENOUS at 02:48

## 2024-11-18 RX ADMIN — HYDROCODONE BITARTRATE AND ACETAMINOPHEN 1 TABLET: 5; 325 TABLET ORAL at 07:51

## 2024-11-18 RX ADMIN — BISACODYL 5 MG: 5 TABLET, COATED ORAL at 13:05

## 2024-11-18 RX ADMIN — IPRATROPIUM BROMIDE AND ALBUTEROL SULFATE 3 ML: 2.5; .5 SOLUTION RESPIRATORY (INHALATION) at 08:14

## 2024-11-18 RX ADMIN — IPRATROPIUM BROMIDE AND ALBUTEROL SULFATE 3 ML: 2.5; .5 SOLUTION RESPIRATORY (INHALATION) at 17:10

## 2024-11-18 RX ADMIN — LIDOCAINE 1 PATCH: 4 PATCH TOPICAL at 11:37

## 2024-11-18 RX ADMIN — Medication 10 ML: at 21:59

## 2024-11-18 RX ADMIN — DOXYCYCLINE 100 MG: 100 INJECTION, POWDER, LYOPHILIZED, FOR SOLUTION INTRAVENOUS at 17:25

## 2024-11-18 RX ADMIN — ACETAMINOPHEN 650 MG: 325 TABLET ORAL at 20:43

## 2024-11-18 RX ADMIN — PIPERACILLIN AND TAZOBACTAM 3.38 G: 3; .375 INJECTION, POWDER, LYOPHILIZED, FOR SOLUTION INTRAVENOUS at 12:29

## 2024-11-18 RX ADMIN — PIPERACILLIN AND TAZOBACTAM 3.38 G: 3; .375 INJECTION, POWDER, LYOPHILIZED, FOR SOLUTION INTRAVENOUS at 05:15

## 2024-11-18 RX ADMIN — IPRATROPIUM BROMIDE AND ALBUTEROL SULFATE 3 ML: 2.5; .5 SOLUTION RESPIRATORY (INHALATION) at 20:02

## 2024-11-18 RX ADMIN — FAMOTIDINE 40 MG: 20 TABLET, FILM COATED ORAL at 08:04

## 2024-11-18 RX ADMIN — PIPERACILLIN AND TAZOBACTAM 3.38 G: 3; .375 INJECTION, POWDER, LYOPHILIZED, FOR SOLUTION INTRAVENOUS at 20:33

## 2024-11-18 RX ADMIN — DOXYCYCLINE 100 MG: 100 INJECTION, POWDER, LYOPHILIZED, FOR SOLUTION INTRAVENOUS at 06:48

## 2024-11-18 RX ADMIN — HYDROCODONE BITARTRATE AND ACETAMINOPHEN 1 TABLET: 5; 325 TABLET ORAL at 15:20

## 2024-11-18 RX ADMIN — IPRATROPIUM BROMIDE AND ALBUTEROL SULFATE 3 ML: 2.5; .5 SOLUTION RESPIRATORY (INHALATION) at 12:30

## 2024-11-18 RX ADMIN — HYDROCODONE BITARTRATE AND ACETAMINOPHEN 1 TABLET: 5; 325 TABLET ORAL at 21:58

## 2024-11-18 RX ADMIN — KETOROLAC TROMETHAMINE 15 MG: 15 INJECTION, SOLUTION INTRAMUSCULAR; INTRAVENOUS at 02:16

## 2024-11-18 RX ADMIN — MORPHINE SULFATE 4 MG: 4 INJECTION, SOLUTION INTRAMUSCULAR; INTRAVENOUS at 04:17

## 2024-11-18 RX ADMIN — Medication 5 MG: at 20:43

## 2024-11-18 RX ADMIN — FLUOXETINE HYDROCHLORIDE 40 MG: 20 CAPSULE ORAL at 08:04

## 2024-11-18 NOTE — PLAN OF CARE
Problem: Adult Inpatient Plan of Care  Goal: Plan of Care Review  Outcome: Progressing  Goal: Patient-Specific Goal (Individualized)  Outcome: Progressing  Goal: Absence of Hospital-Acquired Illness or Injury  Outcome: Progressing  Intervention: Identify and Manage Fall Risk  Recent Flowsheet Documentation  Taken 11/18/2024 1400 by Ish Brown RN  Safety Promotion/Fall Prevention:   safety round/check completed   room organization consistent   nonskid shoes/slippers when out of bed   lighting adjusted   fall prevention program maintained   clutter free environment maintained   activity supervised   assistive device/personal items within reach  Taken 11/18/2024 1000 by Ish Brown RN  Safety Promotion/Fall Prevention:   safety round/check completed   room organization consistent   nonskid shoes/slippers when out of bed   lighting adjusted   fall prevention program maintained   clutter free environment maintained   assistive device/personal items within reach   activity supervised  Taken 11/18/2024 0800 by Ish Brown RN  Safety Promotion/Fall Prevention:   safety round/check completed   room organization consistent   nonskid shoes/slippers when out of bed   lighting adjusted   fall prevention program maintained   clutter free environment maintained   assistive device/personal items within reach   activity supervised  Intervention: Prevent Skin Injury  Recent Flowsheet Documentation  Taken 11/18/2024 1400 by Ish Brown RN  Body Position: position changed independently  Skin Protection: transparent dressing maintained  Taken 11/18/2024 1000 by Ish Brown RN  Body Position: position changed independently  Skin Protection: transparent dressing maintained  Taken 11/18/2024 0800 by Ish Brown RN  Body Position: position changed independently  Skin Protection: transparent dressing maintained  Goal: Optimal Comfort and Wellbeing  Outcome: Progressing  Intervention: Provide Person-Centered  Care  Recent Flowsheet Documentation  Taken 11/18/2024 1400 by Ish Brown RN  Trust Relationship/Rapport: care explained  Taken 11/18/2024 1200 by Ish Brown RN  Trust Relationship/Rapport: care explained  Taken 11/18/2024 1000 by Ish Brown RN  Trust Relationship/Rapport: care explained  Taken 11/18/2024 0800 by Ish Brown RN  Trust Relationship/Rapport: care explained  Goal: Readiness for Transition of Care  Outcome: Progressing     Problem: Pneumonia  Goal: Fluid Balance  Outcome: Progressing  Goal: Absence of Infection Signs and Symptoms  Outcome: Progressing  Goal: Effective Oxygenation and Ventilation  Outcome: Progressing  Intervention: Promote Airway Secretion Clearance  Recent Flowsheet Documentation  Taken 11/18/2024 1400 by Ish Brown RN  Activity Management: activity encouraged  Taken 11/18/2024 1000 by Ish Brown RN  Activity Management: activity encouraged  Cough And Deep Breathing: done independently per patient  Taken 11/18/2024 0800 by Ish Brown RN  Activity Management: up ad rowena  Intervention: Optimize Oxygenation and Ventilation  Recent Flowsheet Documentation  Taken 11/18/2024 1400 by Ish Brown RN  Head of Bed (HOB) Positioning: HOB elevated  Taken 11/18/2024 1000 by Ish Brown RN  Head of Bed (HOB) Positioning: HOB elevated  Taken 11/18/2024 0800 by Ish Brown RN  Head of Bed (HOB) Positioning: HOB elevated   Goal Outcome Evaluation:

## 2024-11-18 NOTE — PAYOR COMM NOTE
"Eder Alves (24 y.o. Female)     [id number]     Charlette Decker, RN  Utilization Review  [phone]  [phone]        Date of Birth   2000    Social Security Number       Address   [address] [place] [address]    Home Phone   [phone]    MRN   [id number]       Muslim   Taoism    Marital Status   Single                            Admission Date   24    Admission Type   Emergency    Admitting Provider   Yash Henderson DO    Attending Provider       Department, Room/Bed   [facility] 3E, S342/1       Discharge Date   11/15/2024    Discharge Disposition   Home or Self Care    Discharge Destination   Home                              Attending Provider: (none)   Allergies: No Known Allergies    Isolation: None   Infection: Rhinovirus  (24)   Code Status: CPR    Ht: 157.5 cm (62\")   Wt: 84.4 kg (186 lb)    Admission Cmt: None   Principal Problem: Biliary obstruction [K83.1]                   Active Insurance as of 2024       Primary Coverage       Payor Plan Insurance Group Employer/Plan Group    [facility] [employer] [id number]       Payor Plan Address Payor Plan Phone Number Payor Plan Fax Number Effective Dates    [address] [phone]  2015 - None Entered    [place] [address]         Subscriber Name Subscriber Birth Date Member ID       ANDRADE ALVES 1971 [id number]                     Emergency Contacts        (Rel.) Home Phone Work Phone Mobile Phone    Andrade Scruggs (Mother) [phone] -- [phone]                 Discharge Summary        Yash Henderson DO at 11/15/24 1501              [facility] Medicine Services  DISCHARGE SUMMARY    Patient Name: Eder Alves  : 2000  MRN: [id number]    Date of Admission: 2024 10:38 PM  Date of Discharge:  11/15/24  Primary Care Physician: Eva Tovar MD    Consults       Date and Time Order " Name Status Description    11/8/2024  7:23 AM Inpatient General Surgery Consult Completed     11/8/2024  2:51 AM Inpatient Gastroenterology Consult Completed             Hospital Course     Presenting Problem: Right upper quadrant abdominal pain    Active Hospital Problems    Diagnosis  POA    Obesity (BMI 30-39.9) [E66.9]  Yes    Mood disorder [F39]  Yes      Resolved Hospital Problems    Diagnosis Date Resolved POA    **Biliary obstruction [K83.1] 11/15/2024 Yes    Post-ERCP acute pancreatitis [K91.89, K85.90] 11/15/2024 No    Calculus of gallbladder and bile duct with obstruction [K80.71] 11/15/2024 Yes          Hospital Course:  Eder Wills is a 24 y.o. female with PMHx of anxiety who presented with right upper quadrant abdominal pain.  Patient found to have biliary obstruction, status post ERCP, biliary sphincterotomy, balloon sweep on 11/9.  Status post laparoscopic cholecystectomy on 11/10 with BRUNO drain placement.  Hospital admission complicated by ERCP induced pancreatitis.  Status post IV fluids and tolerating p.o. intake/having appropriate BM's prior to discharge.  Status post empiric IV Zosyn.  No indication for continued outpatient antibiotics. BRUNO drain removed prior to discharge.  Patient will continue GI soft diet for 1 week after discharge.  Patient to follow-up with surgery in 2 weeks.  Patient to follow-up with primary care physician for evaluation of weaning off metoprolol tartrate started inpatient for tachycardia.     RUQ pain  Biliary obstruction  -CT abdomen pelvis with cholelithiasis with mild stranding around gallbladder along with mild intra and extra hepatic biliary duct dilatation  -MRCP with findings suggestive of acute cholecystitis and choledocholithiasis  -CT abdomen pelvis obtained after ERCP consistent with acute pericarditis  -Status post ERCP, biliary sphincterotomy and balloon sweep on 11/9/2024  -Status post laparoscopic cholecystectomy on 11/10/2024.  BRUNO drain placed during  procedure  -Complicated by ERCP induced pancreatitis, status post IV fluids and tolerating p.o. intake  -Downgraded from ICU on 11/13  -Started on empiric antibiotics, Zosyn.  No concern for ongoing infection, antibiotics discontinued prior to discharge.   -Continue GI soft diet for 1 week.  Then advance diet as tolerated.  -BRUON drain removed prior to discharge  -Follow-up with surgery in 2 weeks     Tachypnea  Tachycardia  -CTA chest negative for PE, small bilateral pleural effusions with compressive atelectasis  -BB started in ICU for tachycardia   -Continue metoprolol tartrate 25mg BID. Follow up with PCP for evaluation of weaning off metoprolol tartrate.      Anxiety  - Continue home prozac    Leukocytosis  - Repeat with PCP in 1 week       Discharge Follow Up Recommendations for outpatient labs/diagnostics:  Continue GI soft diet for 1 week after discharge.  Follow-up with surgery in 2 weeks.  Follow-up with primary care physician for evaluation of weaning off metoprolol tartrate started inpatient for tachycardia.    Day of Discharge     HPI:   Patient seen resting comfortably in bed no acute distress.  Patient denies any abdominal pain currently.  Patient having adequate bowel movements and tolerating p.o. intake.  Discussed plan as documented above with patient.  All questions answered.        Vital Signs:   Temp:  [98.3 °F (36.8 °C)-99.3 °F (37.4 °C)] 98.9 °F (37.2 °C)  Heart Rate:  [] 87  Resp:  [14-18] 14  BP: (105-135)/(70-92) 110/70      Physical Exam  Constitutional:       General: She is not in acute distress.  Eyes:      General: No scleral icterus.     Extraocular Movements: Extraocular movements intact.   Cardiovascular:      Rate and Rhythm: Normal rate.      Pulses: Normal pulses.   Pulmonary:      Effort: Pulmonary effort is normal. No respiratory distress.   Abdominal:      General: There is no distension.      Palpations: Abdomen is soft.      Tenderness: There is no abdominal tenderness.  There is no guarding or rebound.   Musculoskeletal:      Right lower leg: No edema.      Left lower leg: No edema.   Skin:     General: Skin is warm.   Neurological:      General: No focal deficit present.      Mental Status: She is alert.   Psychiatric:         Mood and Affect: Mood normal.         Behavior: Behavior normal.          Pertinent  and/or Most Recent Results     LAB RESULTS:      Lab 11/15/24  0859 11/14/24  0357 11/13/24  0610 11/13/24  0033 11/12/24  1808 11/12/24  1210 11/12/24  0401 11/11/24  1632 11/11/24  1239 11/11/24  0132 11/10/24  0522   WBC 18.55* 15.81* 18.00*  --   --   --  23.38*  --  33.78* 31.11* 18.12*   HEMOGLOBIN 10.5* 9.6* 10.8* 11.0* 11.0*   < > 11.6*  --  15.8 16.3* 15.8   HEMATOCRIT 32.6* 29.1* 33.2* 33.5* 33.3*   < > 35.0  --  48.9* 49.3* 47.6*   PLATELETS 392 319 288  --   --   --  288  --  465* 483* 450   NEUTROS ABS 13.99* 12.75*  --   --   --   --  20.48*  --  29.97* 27.19* 15.84*   IMMATURE GRANS (ABS) 0.75* 0.14*  --   --   --   --  0.18*  --  0.30* 0.18* 0.10*   LYMPHS ABS 1.97 1.49  --   --   --   --  1.21  --  1.22 1.22 0.76   MONOS ABS 1.63* 1.33*  --   --   --   --  1.48*  --  2.21* 2.44* 1.39*   EOS ABS 0.16 0.07  --   --   --   --  0.00  --  0.00 0.00 0.00   MCV 93.4 92.1 93.3  --   --   --  90.9  --  94.0 91.8 91.5   CRP  --   --   --   --   --   --   --   --   --  13.18* 1.82*   PROCALCITONIN  --   --   --   --   --   --   --   --   --   --  0.04   LACTATE  --   --   --   --   --   --  1.0 1.9  --   --   --    LDH  --   --   --   --   --   --   --   --  366*  --   --    PROTIME  --   --   --   --   --   --   --   --   --   --  13.5    < > = values in this interval not displayed.         Lab 11/15/24  0859 11/14/24  1358 11/14/24  0855 11/14/24  0357 11/13/24  1647 11/13/24  0610 11/13/24  0032 11/12/24  1808 11/12/24  0401 11/11/24  1632 11/11/24  1239   SODIUM 138  --   --  138  --  134*  --  136 134*  --   --    POTASSIUM 4.1 4.8  --  3.5  --  3.9  --  4.2 3.6   --   --    CHLORIDE 105  --   --  104  --  102  --  104 101  --   --    CO2 22.0  --   --  23.0  --  23.0  --  24.0 23.0  --   --    ANION GAP 11.0  --   --  11.0  --  9.0  --  8.0 10.0  --   --    BUN 7  --   --  7  --  7  --  7 8  --   --    CREATININE 0.35*  --   --  0.41*  --  0.42*  --  0.43* 0.51*  --   --    EGFR 148.4  --   --  143.8  --  142.9  --  142.1 136.4  --   --    GLUCOSE 97  --   --  102*  --  100*  --  130* 116*  --   --    CALCIUM 7.9*  --   --  7.3*  --  7.8*  --  7.6* 7.6*  --   --    IONIZED CALCIUM  --   --   --   --   --   --   --   --  1.04* 1.01*  --    MAGNESIUM 1.9  --  2.4 1.9  --  2.1  --  2.6 1.7  --   --    PHOSPHORUS 2.5 1.7*  --  1.9* 1.7* 2.4*   < > 2.0* 1.0*  --    < >    < > = values in this interval not displayed.         Lab 11/14/24  0357 11/13/24  0610 11/12/24  0401 11/11/24  1239 11/11/24  0132 11/10/24  0522   TOTAL PROTEIN 4.8* 5.0* 4.6* 4.7* 5.6* 6.4   ALBUMIN 2.7* 2.8* 3.4* 2.9* 3.2* 4.2   GLOBULIN 2.1 2.2 1.2 1.8 2.4 2.2   ALT (SGPT) 54* 71* 81* 136* 195* 351*   AST (SGOT) 29 24 24 30 39* 86*   BILIRUBIN 1.1 1.4* 1.7* 1.5* 1.7* 2.4*   ALK PHOS 135* 126* 73 137* 183* 294*   LIPASE 39 134* 607*  --  1,257* 2,820*         Lab 11/10/24  0522   PROTIME 13.5   INR 1.02                 Brief Urine Lab Results  (Last result in the past 365 days)        Color   Clarity   Blood   Leuk Est   Nitrite   Protein   CREAT   Urine HCG        11/08/24 0113 Yellow   Clear   Negative   Negative   Negative   Negative                 Microbiology Results (last 10 days)       Procedure Component Value - Date/Time    Blood Culture - Blood, Hand, Left [[id number]]  (Normal) Collected: 11/11/24 1632    Lab Status: Preliminary result Specimen: Blood from Hand, Left Updated: 11/14/24 1700     Blood Culture No growth at 3 days    Blood Culture - Blood, Hand, Left [[id number]]  (Normal) Collected: 11/11/24 1240    Lab Status: Preliminary result Specimen: Blood from Hand, Left Updated: 11/15/24 1315      Blood Culture No growth at 4 days    Narrative:      Less than seven (7) mL's of blood was collected.  Insufficient quantity may yield false negative results.    Blood Culture - Blood, Hand, Left [[id number]]  (Normal) Collected: 11/08/24 0530    Lab Status: Final result Specimen: Blood from Hand, Left Updated: 11/13/24 0600     Blood Culture No growth at 5 days    Narrative:      Less than seven (7) mL's of blood was collected.  Insufficient quantity may yield false negative results.    Blood Culture - Blood, Arm, Left [[id number]]  (Normal) Collected: 11/08/24 0525    Lab Status: Final result Specimen: Blood from Arm, Left Updated: 11/13/24 0600     Blood Culture No growth at 5 days    Narrative:      Less than seven (7) mL's of blood was collected.  Insufficient quantity may yield false negative results.            CT Angiogram Chest    Result Date: 11/11/2024  CT ANGIOGRAM CHEST, CT ABD W CONTRAST PELVIS W WO CONTRAST Date of Exam: 11/11/2024 12:46 PM EST Indication: pleuritic chest pasin, tachycardia. Comparison: CT abdomen and pelvis of 11/8/2024. Technique: CTA of the chest was performed before and after the uneventful intravenous administration of 85 mL Isovue-370. Reconstructed coronal and sagittal images were also obtained. In addition, a 3-D volume rendered image was created for interpretation. Automated exposure control and iterative reconstruction methods were used. Findings: CT chest: There are no filling defects suspicious for pulmonary emboli. There are no enlarged mediastinal nodes. There are no hilar masses. There are small bilateral pleural effusions. There is compressive atelectasis in the lung bases. The heart size is  normal. There is no pericardial effusion. CT abdomen and pelvis: The gallbladder has been removed. An oval nonenhancing fluid collection is identified in the gallbladder fossa measuring 6 x 2 cm. There is a surgical drain entering the right abdomen having its tip near the  gallbladder fossa. There is new moderately large amount of abdominal ascites and mild amount of pelvic ascites. There is peripancreatic soft tissue stranding and fluid. There is no evidence of a focal enhancing or encapsulated fluid collection. There is no bile duct dilatation. There are no focal liver lesions. Spleen and adrenal glands appear normal. There are no renal lesions or hydronephrosis. Partially filled bladder appears grossly normal. There is no pelvic mass. There is no large or small bowel dilatation. There is a small amount of free intraperitoneal air identified over the left hepatic lobe. The stomach is collapsed.     Negative exam for pulmonary embolism. Small bilateral pleural effusions with mild compressive bibasilar atelectasis. Findings compatible with acute pancreatitis. Moderate amount of abdominal and small amount of pelvic ascites. Small nonencapsulated fluid collection in the gallbladder fossa, status post cholecystectomy. Small amount of free air is compatible with recent  surgery. Surgical drain is in place. Electronically Signed: Kristi Matta MD  11/11/2024 1:40 PM EST  Workstation ID: [id number]    CT Abd With Contrast Pelvis With & Without Contrast    Result Date: 11/11/2024  CT ANGIOGRAM CHEST, CT ABD W CONTRAST PELVIS W WO CONTRAST Date of Exam: 11/11/2024 12:46 PM EST Indication: pleuritic chest pasin, tachycardia. Comparison: CT abdomen and pelvis of 11/8/2024. Technique: CTA of the chest was performed before and after the uneventful intravenous administration of 85 mL Isovue-370. Reconstructed coronal and sagittal images were also obtained. In addition, a 3-D volume rendered image was created for interpretation. Automated exposure control and iterative reconstruction methods were used. Findings: CT chest: There are no filling defects suspicious for pulmonary emboli. There are no enlarged mediastinal nodes. There are no hilar masses. There are small bilateral pleural effusions.  There is compressive atelectasis in the lung bases. The heart size is  normal. There is no pericardial effusion. CT abdomen and pelvis: The gallbladder has been removed. An oval nonenhancing fluid collection is identified in the gallbladder fossa measuring 6 x 2 cm. There is a surgical drain entering the right abdomen having its tip near the gallbladder fossa. There is new moderately large amount of abdominal ascites and mild amount of pelvic ascites. There is peripancreatic soft tissue stranding and fluid. There is no evidence of a focal enhancing or encapsulated fluid collection. There is no bile duct dilatation. There are no focal liver lesions. Spleen and adrenal glands appear normal. There are no renal lesions or hydronephrosis. Partially filled bladder appears grossly normal. There is no pelvic mass. There is no large or small bowel dilatation. There is a small amount of free intraperitoneal air identified over the left hepatic lobe. The stomach is collapsed.     Negative exam for pulmonary embolism. Small bilateral pleural effusions with mild compressive bibasilar atelectasis. Findings compatible with acute pancreatitis. Moderate amount of abdominal and small amount of pelvic ascites. Small nonencapsulated fluid collection in the gallbladder fossa, status post cholecystectomy. Small amount of free air is compatible with recent  surgery. Surgical drain is in place. Electronically Signed: Kristi Matta MD  11/11/2024 1:40 PM EST  Workstation ID: [id number]    FL ERCP pancreatic and biliary ducts    Result Date: 11/11/2024  FL ERCP PANCREATIC AND BILIARY DUCTS Date of Exam: 11/9/2024 8:00 AM EST Indication: ENDOSCOPIC RETROGRADE CHOLANGIOPANCREATOGRAPHY.   Comparison: None available. Technique:  A series of radiographic digital spot films were obtained in conjunction with an endoscopic catheterization of the biliary and pancreatic ductal system, performed by the gastroenterologist. Fluoroscopic Time: 1 minute 4  seconds Number of Images: 11 Findings: Fluoroscopy demonstrating filling of prominent bile ducts.     Impression: Fluoroscopy demonstrating filling of prominent bile ducts. Please see procedure report for full findings. Electronically Signed: William Ingram MD  11/11/2024 10:20 AM EST  Workstation ID: [id number]    MRI abdomen wo contrast mrcp    Result Date: 11/8/2024  MRI ABDOMEN WO CONTRAST MRCP Date of Exam: 11/8/2024 4:57 AM EST Indication: ? obstructing stone.  RUQ pain, cholelithiasis on CT A/P.  Comparison: Ultrasound right upper quadrant November 8, 2024; CT abdomen pelvis November 8, 2024 Technique: Standard noncontrast MR pulse sequences of the abdomen were obtained. High-resolution T2 MRCP images were acquired and 3-D MIP reconstructions were created for interpretation. Findings: Lung bases appear grossly clear.  Visualized mediastinal structures are unremarkable.  Superficial fat and underlying musculature appear within normal limits.  Bone marrow signal appears grossly unremarkable. The liver appears normal in size and signal.  Liver vasculature appears conventional and patent.  There is no intrahepatic biliary dilatation. The gallbladder is nondistended. There are innumerable stones throughout the gallbladder. There is gallbladder wall thickening and pericholecystic fluid suggestive of acute cholecystitis. Common bile duct measures 8 mm diameter. There are a few small stones in the distal common bile duct The pancreas appears normal signal without solid or cystic mass or adjacent inflammation. The pancreatic duct is nondistended.  There is no evidence of pancreatic divisum. The spleen is normal size and signal.  There is no adrenal mass.  No solid or cystic kidney mass or hydronephrosis.  There is no focal intraperitoneal inflammation or fluid collection.  No evidence of ascites. No lymphadenopathy.     Impression: 1.Cholelithiasis with gallbladder wall thickening and pericholecystic fluid suggestive  of acute cholecystitis. 2.Choledocholithiasis with a few small stones in the distal common bile duct. Electronically Signed: Dmitriy Spaulding MD  11/8/2024 5:56 AM EST  Workstation ID: [id number]    US Gallbladder    Result Date: 11/8/2024  US GALLBLADDER Date of Exam: 11/8/2024 1:33 AM EST Indication: RUQ pain. Comparison: CT abdomen pelvis dated November 8, 2024 Technique: Grayscale and color Doppler ultrasound evaluation of the right upper quadrant was performed. Findings: The liver is homogeneous. There are no focal liver lesions. Portal venous and hepatic venous flows are normal. There are multiple stones in the gallbladder. There is marked biliary ductal dilatation up to 1.5 cm. Obstructing gallstone would be in the differential. Correlation with MRCP or ERCP may be required. The visualized pancreatic tissue is normal. The right kidney is 9.3 cm in [id number] length and there is no hydronephrosis.     Impression: 1.Cholelithiasis. 2.Marked biliary ductal dilatation. Correlation with MRCP or ERCP may be required. Electronically Signed: Dmitriy Spaulding MD  11/8/2024 2:28 AM EST  Workstation ID: [id number]    CT Abdomen Pelvis With Contrast    Result Date: 11/8/2024  CT ABDOMEN PELVIS W CONTRAST Date of Exam: 11/8/2024 12:38 AM EST Indication: RUQ pain, jaundice. Comparison: None available. Technique: Axial CT images were obtained of the abdomen and pelvis following the uneventful intravenous administration of 85 mL Isovue-300. Reconstructed coronal and sagittal images were also obtained. Automated exposure control and iterative construction methods were used. Findings: Lung Bases:   The visualized lung bases and lower mediastinal structures are unremarkable. Liver: Liver is normal in size and CT density. No focal lesions. Biliary/Gallbladder:  There are multiple stones in the gallbladder. There is mild stranding around the gallbladder wall. There is mild intra and proximal extrahepatic biliary ductal dilatation. Spleen:  Spleen is normal in size and CT density. Pancreas:  Pancreas is normal. There is no evidence of pancreatic mass or peripancreatic fluid. Kidneys:  Kidneys are normal in size. There are no stones or hydronephrosis. Adrenals:  Adrenal glands are unremarkable. Retroperitoneal/Lymph Nodes/Vasculature:  No retroperitoneal adenopathy is identified. Gastrointestinal/Mesentery:  The bowel loops are non-dilated without wall thickening or mass. The appendix appears within normal limits. No evidence of obstruction. No free air. No mesenteric fluid collections identified. Bladder:  The bladder is normal. Genital:   Unremarkable       Bony Structures:   Visualized bony structures are consistent with the patient's age.     Impression: Cholelithiasis with mild stranding around the gallbladder wall and mild intra and proximal extrahepatic biliary ductal dilatation. Electronically Signed: Dmitriy Spaulding MD  11/8/2024 1:18 AM EST  Workstation ID: [id number]                 Plan for Follow-up of Pending Labs/Results: Will follow up   Pending Labs       Order Current Status    Blood Culture - Blood, Hand, Left Preliminary result    Blood Culture - Blood, Hand, Left Preliminary result          Discharge Details        Discharge Medications        New Medications        Instructions Start Date   metoprolol tartrate 25 MG tablet  Commonly known as: LOPRESSOR   25 mg, Oral, 2 Times Daily             Continue These Medications        Instructions Start Date   amphetamine-dextroamphetamine XR 30 MG 24 hr capsule  Commonly known as: ADDERALL XR   1 capsule, Daily      FLUoxetine 40 MG capsule  Commonly known as: PROzac   1 capsule, Daily               No Known Allergies      Discharge Disposition:  Home or Self Care    Diet:  Hospital:  Diet Order   Procedures    Diet: Gastrointestinal; Low Irritant, Fiber-Restricted; Fluid Consistency: Thin (IDDSI 0)       Diet Instructions       Diet: Gastrointestinal Diets; Fiber-Restricted, Low Irritant; Thin  (IDDSI 0)      Discharge Diet: Gastrointestinal Diets    Gastrointestinal Diet:  Fiber-Restricted  Low Irritant       Fluid Consistency: Thin (IDDSI 0)    Follow GI diet for 1 week after discharge             Activity: As tolerated       Restrictions or Other Recommendations:  As tolerated        CODE STATUS:    Code Status and Medical Interventions: CPR (Attempt to Resuscitate); Full Support   Ordered at: 11/08/24 0236     Level Of Support Discussed With:    Patient     Code Status (Patient has no pulse and is not breathing):    CPR (Attempt to Resuscitate)     Medical Interventions (Patient has pulse or is breathing):    Full Support       No future appointments.    Additional Instructions for the Follow-ups that You Need to Schedule       Discharge Follow-up with PCP   As directed       Currently Documented PCP:    Eva Tovar MD    PCP Phone Number:    [phone]     Follow Up Details: 1 week        Discharge Follow-up with Specialty: Follow-up with surgery in 2 weeks; 2 Weeks   As directed      Specialty: Follow-up with surgery in 2 weeks   Follow Up: 2 Weeks                      Yash Henderson DO  11/15/24      Time Spent on Discharge:  I spent  40  minutes on this discharge activity which included: face-to-face encounter with the patient, reviewing the data in the system, coordination of the care with the nursing staff as well as consultants, documentation, and entering orders.            Electronically signed by Yash Henderson DO at 11/15/24 9771

## 2024-11-18 NOTE — ED PROVIDER NOTES
[facility]    EMERGENCY DEPARTMENT ENCOUNTER      Pt Name: Eder Wills  MRN: [id number]  YOB: 2000  Date of evaluation: 11/18/2024  Provider: Jovon Salas MD    CHIEF COMPLAINT       Chief Complaint   Patient presents with    Abdominal Pain         HISTORY OF PRESENT ILLNESS   Eder Wills is a 24 y.o. female who presents to the emergency department for evaluation of relatively acute onset left-sided abdominal/flank pain.  Pain does not radiate.  She has not had any dysuria, hematuria.  Her last menstrual period was a week ago and normal.  She is not having any nausea or vomiting.  No fevers or chills.    REVIEW OF SYSTEMS     ROS:  A chief complaint appropriate review of systems was completed and is negative except as noted in the HPI.      PAST MEDICAL HISTORY     Past Medical History:   Diagnosis Date    Obesity (BMI 30-39.9) 11/11/2024         SURGICAL HISTORY       Past Surgical History:   Procedure Laterality Date    ADENOIDECTOMY  2008    CHOLECYSTECTOMY N/A 11/10/2024    Procedure: CHOLECYSTECTOMY LAPAROSCOPIC;  Surgeon: Raheem Raza MD;  Location: [facility] OR;  Service: General;  Laterality: N/A;    COSMETIC SURGERY      2021    ERCP N/A 11/9/2024    Procedure: ENDOSCOPIC RETROGRADE CHOLANGIOPANCREATOGRAPHY;  Surgeon: Dick Patrick MD;  Location: [facility] ENDOSCOPY;  Service: Gastroenterology;  Laterality: N/A;    TONSILLECTOMY  2008         CURRENT MEDICATIONS       Current Facility-Administered Medications:     doxycycline (VIBRAMYCIN) 100 mg in sodium chloride 0.9 % 100 mL MBP, 100 mg, Intravenous, Q12H, Barbara Michaels APRN    piperacillin-tazobactam (ZOSYN) 3.375 g IVPB in 100 mL NS MBP (CD), 3.375 g, Intravenous, Once, Barbara Michaels APRN    piperacillin-tazobactam (ZOSYN) 3.375 g IVPB in 100 mL NS MBP (CD), 3.375 g, Intravenous, Q8H, Barbara Michaels APRN    Sodium Chloride (PF) 0.9 % 10 mL, 10 mL, Intravenous, PRN, Jovon Salas MD    Current Outpatient Medications:      amphetamine-dextroamphetamine XR (ADDERALL XR) 30 MG 24 hr capsule, Take 1 capsule by mouth Daily, Disp: , Rfl:     FLUoxetine (PROzac) 40 MG capsule, Take 1 capsule by mouth Daily., Disp: , Rfl:     metoprolol tartrate (LOPRESSOR) 25 MG tablet, Take 1 tablet by mouth 2 (Two) Times a Day for 30 days., Disp: 60 tablet, Rfl: 0    ALLERGIES     Patient has no known allergies.    FAMILY HISTORY       Family History   Problem Relation Age of Onset    No Known Problems Mother     No Known Problems Father           SOCIAL HISTORY       Social History     Socioeconomic History    Marital status: Single   Tobacco Use    Smoking status: Never    Smokeless tobacco: Never   Vaping Use    Vaping status: Never Used   Substance and Sexual Activity    Alcohol use: Yes     Comment: socially    Drug use: Never    Sexual activity: Defer         PHYSICAL EXAM    (up to 7 for level 4, 8 or more for level 5)     Vitals:    11/18/24 0218 11/18/24 0230 11/18/24 0300 11/18/24 0332   BP: 115/70 107/76 112/78 117/71   BP Location:       Patient Position:       Pulse: 88 85 88 85   Resp: 20      Temp:       TempSrc:       SpO2: 96% 96% 92% 95%   Weight:       Height:           Physical Exam  Constitutional:       General: She is not in acute distress.  HENT:      Head: Normocephalic and atraumatic.   Eyes:      Conjunctiva/sclera: Conjunctivae normal.      Pupils: Pupils are equal, round, and reactive to light.   Cardiovascular:      Rate and Rhythm: Normal rate and regular rhythm.      Pulses: Normal pulses.      Heart sounds: No murmur heard.     No gallop.   Pulmonary:      Effort: Pulmonary effort is normal. No respiratory distress.   Abdominal:      General: Abdomen is flat. There is no distension.      Tenderness: There is no abdominal tenderness. There is left CVA tenderness. There is no right CVA tenderness.   Musculoskeletal:         General: No swelling or deformity. Normal range of motion.      Right lower leg: Edema present.       Left lower leg: Edema present.   Skin:     General: Skin is warm and dry.      Capillary Refill: Capillary refill takes less than 2 seconds.   Neurological:      General: No focal deficit present.      Mental Status: She is alert and oriented to person, place, and time.   Psychiatric:         Mood and Affect: Mood normal.         Behavior: Behavior normal.            DIAGNOSTIC RESULTS     EKG: All EKGs are interpreted by the Emergency Department Physician who either signs or Co-signs this chart in the absence of a cardiologist.    No orders to display         RADIOLOGY:   [x] Radiologist's Report Reviewed:  CT Abdomen Pelvis With Contrast   Final Result   Impression:   1.No significant change. Findings consistent with acute pancreatitis with extensive inflammatory changes surrounding the pancreas and extending along the retroperitoneum, similar as compared to the previous study. No drainable collection identified. No    evidence of pancreatic ductal dilatation.   2.Bilateral lower lobe atelectasis versus pneumonia, mildly progressed as compared to the previous study. Small bilateral pleural effusions, improved as compared to the previous study.   3.Ancillary findings as described above.            Electronically Signed: Irma Schaffer MD     11/18/2024 3:02 AM EST     Workstation ID: [id number]      XR Chest 1 View    (Results Pending)       I ordered and independently reviewed the above noted radiographic studies.        LABS:  I independently interpreted all laboratory studies conducted during this ED visit.  The results of these studies can be seen below and my independent interpretation in the ED course      EMERGENCY DEPARTMENT COURSE and DIFFERENTIAL DIAGNOSIS/MDM:   Vitals:  AS OF 04:30 EST    BP - 117/71  HR - 85  TEMP - 98.4 °F (36.9 °C) (Oral)  O2 SATS - 95%        Discussion below represents my analysis of pertinent findings related to patient's condition, differential diagnosis, treatment plan and final  disposition.      Differential diagnosis:  The differential diagnosis associated with the patient's presentation includes: Kidney stone, urinary tract infection, pancreatitis, gastritis, musculoskeletal pain.      Independent interpretations (ECG/rhythm strip/X-ray/US/CT scan): See ED course      Additional sources:  Discussed/obtained information from independent historians:   [] Spouse:    [x] Parent:Patient's father at the bedside provides some details of HPI   [] Friend:   [] EMS:   [] Other:    External record review:  11/15/2024 reviewed most recent discharge summary.  Patient was hospitalized for biliary obstruction and underwent ERCP which was complicated by acute pancreatitis.  Patient discharged from the hospital 3 days ago.      Patient's care impacted by:   [] Diabetes   [] Hypertension   [] Coronary Artery Disease   [] Cancer   [] Other:     Care significantly affected by Social Determinants of Health (housing and economic circumstances, unemployment)    [] Yes     [x] No   If yes, Patient's care significantly limited by  Social Determinants of Health including:    [] Inadequate housing    [] Low income    [] Alcoholism and drug addiction in family    [] Problems related to primary support group    [] Unemployment    [] Problems related to employment    [] Other Social Determinants of Health:       ED Course:    ED Course as of 11/18/24 0430   Mon Nov 18, 2024   0426 Laboratory workup independently interpreted by myself demonstrates elevated lipase not meeting diagnostic criteria for acute pancreatitis, elevated white blood cell count with neutrophilic predominance, normal lactic acid [KB]   0427 CT scan of the abdomen and pelvis independently interpreted by myself is unchanged from prior studies with substantial inflammatory changes around the pancreas, bilateral lower lobe atelectasis [KB]      ED Course User Index  [KB] Jovon Salas MD         Patient was reevaluated in the emergency department.   Her symptoms are improved but ongoing.  I discussed with her her test results.  I do not have a clear understanding of why her symptoms would acutely worsen or why her white count would be climbing, may simply be an acute phase reactant in the setting of pain and substantial pancreatic inflammation.  Given worsening symptoms, worsening laboratory markers compared with time of discharge will readmit patient for further evaluation and treatment      PROCEDURES:  Procedures        CONSULTS   Consulted with hospital medicine for admission    FINAL IMPRESSION      1. Other acute pancreatitis, unspecified complication status    2. Leukocytosis, unspecified type          DISPOSITION/PLAN     ED Disposition       ED Disposition   Decision to Admit    Condition   --    Comment   Level of Care: Telemetry [5]   Diagnosis: Acute pancreatitis [577.0.ICD-9-CM]   Admitting Physician: SHANELL GRIMES [[id number]]   Isolate for COVID?: No [0]   Certification: I Certify That Inpatient Hospital Services Are Medically Necessary For Greater Than 2 Midnights                   Comment: Please note this report has been produced using speech recognition software.      Jovon Salas MD  Attending Emergency Physician    Recent Results (from the past 24 hours)   Comprehensive Metabolic Panel    Collection Time: 11/18/24  1:20 AM    Specimen: Blood   Result Value Ref Range    Glucose 107 (H) 65 - 99 mg/dL    BUN 5 (L) 6 - 20 mg/dL    Creatinine 0.45 (L) 0.57 - 1.00 mg/dL    Sodium 136 136 - 145 mmol/L    Potassium 4.1 3.5 - 5.2 mmol/L    Chloride 101 98 - 107 mmol/L    CO2 24.0 22.0 - 29.0 mmol/L    Calcium 8.2 (L) 8.6 - 10.5 mg/dL    Total Protein 5.8 (L) 6.0 - 8.5 g/dL    Albumin 2.8 (L) 3.5 - 5.2 g/dL    ALT (SGPT) 31 1 - 33 U/L    AST (SGOT) 32 1 - 32 U/L    Alkaline Phosphatase 195 (H) 39 - 117 U/L    Total Bilirubin 0.7 0.0 - 1.2 mg/dL    Globulin 3.0 gm/dL    A/G Ratio 0.9 g/dL    BUN/Creatinine Ratio 11.1 7.0 - 25.0    Anion Gap 11.0 5.0 -  15.0 mmol/L    eGFR 139.7 >60.0 mL/min/1.73   Lipase    Collection Time: 11/18/24  1:20 AM    Specimen: Blood   Result Value Ref Range    Lipase 76 (H) 13 - 60 U/L   hCG, Quantitative, Pregnancy    Collection Time: 11/18/24  1:20 AM    Specimen: Blood   Result Value Ref Range    HCG Quantitative <0.10 mIU/mL   Green Top (Gel)    Collection Time: 11/18/24  1:20 AM   Result Value Ref Range    Extra Tube Hold for add-ons.    Lavender Top    Collection Time: 11/18/24  1:20 AM   Result Value Ref Range    Extra Tube hold for add-on    Gold Top - SST    Collection Time: 11/18/24  1:20 AM   Result Value Ref Range    Extra Tube Hold for add-ons.    Gray Top    Collection Time: 11/18/24  1:20 AM   Result Value Ref Range    Extra Tube Hold for add-ons.    Light Blue Top    Collection Time: 11/18/24  1:20 AM   Result Value Ref Range    Extra Tube Hold for add-ons.    CBC Auto Differential    Collection Time: 11/18/24  1:20 AM    Specimen: Blood   Result Value Ref Range    WBC 28.63 (H) 3.40 - 10.80 10*3/mm3    RBC 3.28 (L) 3.77 - 5.28 10*6/mm3    Hemoglobin 9.9 (L) 12.0 - 15.9 g/dL    Hematocrit 30.3 (L) 34.0 - 46.6 %    MCV 92.4 79.0 - 97.0 fL    MCH 30.2 26.6 - 33.0 pg    MCHC 32.7 31.5 - 35.7 g/dL    RDW 14.5 12.3 - 15.4 %    RDW-SD 49.1 37.0 - 54.0 fl    MPV 9.7 6.0 - 12.0 fL    Platelets 486 (H) 140 - 450 10*3/mm3    Neutrophil % 77.7 (H) 42.7 - 76.0 %    Lymphocyte % 8.7 (L) 19.6 - 45.3 %    Monocyte % 6.9 5.0 - 12.0 %    Eosinophil % 0.5 0.3 - 6.2 %    Basophil % 0.3 0.0 - 1.5 %    Immature Grans % 5.9 (H) 0.0 - 0.5 %    Neutrophils, Absolute 22.25 (H) 1.70 - 7.00 10*3/mm3    Lymphocytes, Absolute 2.50 0.70 - 3.10 10*3/mm3    Monocytes, Absolute 1.97 (H) 0.10 - 0.90 10*3/mm3    Eosinophils, Absolute 0.15 0.00 - 0.40 10*3/mm3    Basophils, Absolute 0.08 0.00 - 0.20 10*3/mm3    Immature Grans, Absolute 1.68 (H) 0.00 - 0.05 10*3/mm3    nRBC 0.0 0.0 - 0.2 /100 WBC   Lactic Acid, Plasma    Collection Time: 11/18/24  1:20 AM     Specimen: Blood   Result Value Ref Range    Lactate 0.9 0.5 - 2.0 mmol/L   Triglycerides    Collection Time: 11/18/24  1:20 AM    Specimen: Blood   Result Value Ref Range    Triglycerides 151 (H) 0 - 150 mg/dL   C-reactive Protein    Collection Time: 11/18/24  1:20 AM    Specimen: Blood   Result Value Ref Range    C-Reactive Protein 30.11 (H) 0.00 - 0.50 mg/dL   Urinalysis With Microscopic If Indicated (No Culture) - Urine, Clean Catch    Collection Time: 11/18/24  1:30 AM    Specimen: Urine, Clean Catch   Result Value Ref Range    Color, UA Yellow Yellow, Straw    Appearance, UA Clear Clear    pH, UA 7.0 5.0 - 8.0    Specific Gravity, UA 1.014 1.001 - 1.030    Glucose, UA Negative Negative    Ketones, UA Negative Negative    Bilirubin, UA Negative Negative    Blood, UA Negative Negative    Protein, UA Negative Negative    Leuk Esterase, UA Negative Negative    Nitrite, UA Negative Negative    Urobilinogen, UA 0.2 E.U./dL 0.2 - 1.0 E.U./dL   POC Urine Pregnancy    Collection Time: 11/18/24  1:37 AM    Specimen: Urine   Result Value Ref Range    HCG, Urine, QL Negative Negative    Lot Number [id number]     Internal Positive Control Passed Positive, Passed    Internal Negative Control Passed Negative, Passed    Expiration Date 01-      Note: In addition to lab results from this visit, the labs listed above may include labs taken at another facility or during a different encounter within the last 24 hours. Please correlate lab times with ED admission and discharge times for further clarification of the services performed during this visit.                 Jovon Salas MD  11/18/24 9549

## 2024-11-18 NOTE — ED NOTES
Eder Wills    Nursing Report ED to Floor:  Mental status: A&O x4  Ambulatory status: Standby  Oxygen Therapy:  RA  Cardiac Rhythm: NSR  Admitted from: ED/Home  Safety Concerns:  NA  Social Issues: NA  ED Room #:  14    ED Nurse Phone Extension - [phone] or may call [phone].      HPI:   Chief Complaint   Patient presents with    Abdominal Pain       Past Medical History:  Past Medical History:   Diagnosis Date    Obesity (BMI 30-39.9) 11/11/2024        Past Surgical History:  Past Surgical History:   Procedure Laterality Date    ADENOIDECTOMY  2008    CHOLECYSTECTOMY N/A 11/10/2024    Procedure: CHOLECYSTECTOMY LAPAROSCOPIC;  Surgeon: Raheem Raza MD;  Location:  [facility] OR;  Service: General;  Laterality: N/A;    COSMETIC SURGERY      2021    ERCP N/A 11/9/2024    Procedure: ENDOSCOPIC RETROGRADE CHOLANGIOPANCREATOGRAPHY;  Surgeon: Dick Patrick MD;  Location:  [facility] ENDOSCOPY;  Service: Gastroenterology;  Laterality: N/A;    TONSILLECTOMY  2008        Admitting Doctor:   Abhishek Winston MD    Consulting Provider(s):  Consults       Date and Time Order Name Status Description    11/8/2024  7:23 AM Inpatient General Surgery Consult Completed     11/8/2024  2:51 AM Inpatient Gastroenterology Consult Completed              Admitting Diagnosis:   The encounter diagnosis was Other acute pancreatitis, unspecified complication status.    Most Recent Vitals:   Vitals:    11/18/24 0218 11/18/24 0230 11/18/24 0300 11/18/24 0332   BP: 115/70 107/76 112/78 117/71   BP Location:       Patient Position:       Pulse: 88 85 88 85   Resp: 20      Temp:       TempSrc:       SpO2: 96% 96% 92% 95%   Weight:       Height:           Active LDAs/IV Access:   Lines, Drains & Airways       Active LDAs       Name Placement date Placement time Site Days    Peripheral IV 11/18/24 0118 Anterior;Left Forearm 11/18/24 0118  Forearm  less than 1                    Labs (abnormal labs have a star):   Labs Reviewed   COMPREHENSIVE METABOLIC  PANEL - Abnormal; Notable for the following components:       Result Value    Glucose 107 (*)     BUN 5 (*)     Creatinine 0.45 (*)     Calcium 8.2 (*)     Total Protein 5.8 (*)     Albumin 2.8 (*)     Alkaline Phosphatase 195 (*)     All other components within normal limits    Narrative:     GFR Normal >60  Chronic Kidney Disease <60  Kidney Failure <15    Hemolyzed specimen. Testing performed per physician request.slighthemolysis   LIPASE - Abnormal; Notable for the following components:    Lipase 76 (*)     All other components within normal limits   CBC WITH AUTO DIFFERENTIAL - Abnormal; Notable for the following components:    WBC 28.63 (*)     RBC 3.28 (*)     Hemoglobin 9.9 (*)     Hematocrit 30.3 (*)     Platelets 486 (*)     Neutrophil % 77.7 (*)     Lymphocyte % 8.7 (*)     Immature Grans % 5.9 (*)     Neutrophils, Absolute 22.25 (*)     Monocytes, Absolute 1.97 (*)     Immature Grans, Absolute 1.68 (*)     All other components within normal limits   TRIGLYCERIDES - Abnormal; Notable for the following components:    Triglycerides 151 (*)     All other components within normal limits    Narrative:     Triglyceride Reference Ranges:    Normal           less than 150 mg/dL  Borderline       150-199 mg/dL  High             200-499 mg/dL  Very High        greater than 499 mg/dL   C-REACTIVE PROTEIN - Abnormal; Notable for the following components:    C-Reactive Protein 30.11 (*)     All other components within normal limits   URINALYSIS W/ MICROSCOPIC IF INDICATED (NO CULTURE) - Normal    Narrative:     Urine microscopic not indicated.   LACTIC ACID, PLASMA - Normal   BLOOD CULTURE   BLOOD CULTURE   MRSA SCREEN, PCR   RESPIRATORY PANEL PCR W/ COVID-19 (SARS-COV-2), NP SWAB IN UTM/VTP, 2 HR TAT   LEGIONELLA ANTIGEN, URINE   STREP PNEUMO AG, URINE OR CSF   RAINBOW DRAW    Narrative:     The following orders were created for panel order [place] Draw.  Procedure                               Abnormality          Status                     ---------                               -----------         ------                     Green Top (Gel)[[id number]]                                  Final result               Lavender Top[[id number]]                                     Final result               Gold Top - SST[[id number]]                                   Final result               G[place] Top[[id number]]                                         Final result               Light Blue Top[[id number]]                                   Final result                 Please view results for these tests on the individual orders.   HCG, QUANTITATIVE, PREGNANCY    Narrative:     HCG Ranges by Gestational Age    Females - non-pregnant premenopausal   </= 1mIU/mL HCG  Females - postmenopausal               </= 7mIU/mL HCG    3 Weeks         5.8 -    71.2 mIU/mL  4 Weeks         9.5 -     750 mIU/mL  5 Weeks         217 -   7,138 mIU/mL  6 Weeks         158 -  31,795 mIU/mL  7 Weeks       3,697 - 163,563 mIU/mL  8 Weeks      32,065 - 149,571 mIU/mL  9 Weeks      63,803 - 151,410 mIU/mL  10 Weeks     46,509 - 186,977 mIU/mL  12 Weeks     27,832 - 210,612 mIU/mL  14 Weeks     13,950 -  62,530 mIU/mL  15 Weeks     12,039 -  70,971 mIU/mL  16 Weeks      9,040 -  56,451 mIU/mL  17 Weeks      8,175 -  55,868 mIU/mL  18 Weeks      8,099 -  58,176 mIU/mL  Results may be falsely decreased if patient taking Biotin.     PROCALCITONIN   MAGNESIUM   POCT PEFORM URINE PREGNANCY   CBC AND DIFFERENTIAL    Narrative:     The following orders were created for panel order CBC & Differential.  Procedure                               Abnormality         Status                     ---------                               -----------         ------                     CBC Auto Differential[[id number]]        Abnormal            Final result               Scan Slide[[id number]]                                                                    Please view results for  these tests on the individual orders.   GREEN TOP   LAVENDER TOP   GOLD TOP - SST   GRAY TOP   LIGHT BLUE TOP       Meds Given in ED:   Medications   Sodium Chloride (PF) 0.9 % 10 mL (has no administration in time range)   Morphine sulfate (PF) injection 4 mg (has no administration in time range)   piperacillin-tazobactam (ZOSYN) 3.375 g IVPB in 100 mL NS MBP (CD) (has no administration in time range)   piperacillin-tazobactam (ZOSYN) 3.375 g IVPB in 100 mL NS MBP (CD) (has no administration in time range)   doxycycline (VIBRAMYCIN) 100 mg in sodium chloride 0.9 % 100 mL MBP (has no administration in time range)   ketorolac (TORADOL) injection 15 mg (15 mg Intravenous Given 11/18/24 0216)   iopamidol (ISOVUE-300) 61 % injection 80 mL (80 mL Intravenous Given 11/18/24 0248)           Last NIH score:                                                          Dysphagia screening results:  Patient Factors Component (Dysphagia:Stroke or Rule-out)  Best Eye Response: 4-->(E4) spontaneous (11/18/24 0215)  Best Motor Response: 6-->(M6) obeys commands (11/18/24 0215)  Best Verbal Response: 5-->(V5) oriented (11/18/24 0215)  [place] Coma Scale Score: 15 (11/18/24 0215)     [place] Coma Scale:  No data recorded     CIWA:        Restraint Type:            Isolation Status:  No active isolations

## 2024-11-18 NOTE — PLAN OF CARE
Problem: Adult Inpatient Plan of Care  Goal: Plan of Care Review  Outcome: Progressing  Goal: Patient-Specific Goal (Individualized)  Outcome: Progressing  Goal: Absence of Hospital-Acquired Illness or Injury  Outcome: Progressing  Intervention: Identify and Manage Fall Risk  Recent Flowsheet Documentation  Taken 11/18/2024 0600 by Ambrocio Guajardo RN  Safety Promotion/Fall Prevention:   activity supervised   assistive device/personal items within reach   clutter free environment maintained   toileting scheduled   safety round/check completed   room organization consistent   nonskid shoes/slippers when out of bed  Intervention: Prevent Skin Injury  Recent Flowsheet Documentation  Taken 11/18/2024 0600 by Ambrocio Guajardo RN  Body Position: position changed independently  Skin Protection: transparent dressing maintained  Intervention: Prevent Infection  Recent Flowsheet Documentation  Taken 11/18/2024 0600 by Ambrocio Guajardo RN  Infection Prevention:   cohorting utilized   environmental surveillance performed   equipment surfaces disinfected   hand hygiene promoted   rest/sleep promoted   single patient room provided  Goal: Optimal Comfort and Wellbeing  Outcome: Progressing  Intervention: Provide Person-Centered Care  Recent Flowsheet Documentation  Taken 11/18/2024 0600 by Ambrocio Guajardo RN  Trust Relationship/Rapport:   care explained   choices provided   emotional support provided   questions answered   empathic listening provided   reassurance provided   questions encouraged   thoughts/feelings acknowledged  Goal: Readiness for Transition of Care  Outcome: Progressing  Intervention: Mutually Develop Transition Plan  Recent Flowsheet Documentation  Taken 11/18/2024 0502 by Ambrocio Guajardo RN  Equipment Currently Used at Home: none  Taken 11/18/2024 0500 by Ambrocio Guajardo RN  Transportation Anticipated: family or friend will provide  Patient/Family Anticipated Services at Transition:  none  Patient/Family Anticipates Transition to: home with family     Problem: Pneumonia  Goal: Fluid Balance  Outcome: Progressing  Goal: Absence of Infection Signs and Symptoms  Outcome: Progressing  Goal: Effective Oxygenation and Ventilation  Outcome: Progressing  Intervention: Promote Airway Secretion Clearance  Recent Flowsheet Documentation  Taken 11/18/2024 0600 by Ambrocio Guajardo, RN  Activity Management: activity minimized  Intervention: Optimize Oxygenation and Ventilation  Recent Flowsheet Documentation  Taken 11/18/2024 0600 by Ambrocio Guajardo, RN  Head of Bed (HOB) Positioning: HOB elevated   Goal Outcome Evaluation:

## 2024-11-18 NOTE — PROGRESS NOTES
Discharge Planning Assessment  [facility]     Patient Name: Eder Wills  MRN: [id number]  Today's Date: 11/18/2024    Admit Date: 11/18/2024        Discharge Needs Assessment    No documentation.                  Discharge Plan       Row Name 11/18/24 1038       Plan    Plan Comments Lives in [place] and has [employer] insurance. Eva Calhoun is the primary care provider. Case Management will follow for discharge planning needs.    Final Discharge Disposition Code 01 - home or self-care                  Continued Care and Services - Admitted Since 11/18/2024    No active coordination exists for this encounter.       Expected Discharge Date and Time       Expected Discharge Date Expected Discharge Time    Nov 19, 2024            Demographic Summary       Row Name 11/18/24 1037       General Information    Arrived From home    Referral Source patient    Reason for Consult discharge planning    Preferred Language English       Contact Information    Permission Granted to Share Info With     Contact Information Obtained for                    Functional Status       Row Name 11/18/24 1037       Functional Status    Usual Activity Tolerance moderate       Functional Status, IADL    Medications independent    Meal Preparation independent    Housekeeping independent    Laundry independent    Shopping independent                   Psychosocial    No documentation.                  Abuse/Neglect    No documentation.                  Legal    No documentation.                  Substance Abuse    No documentation.                  Patient Forms    No documentation.                     ABDIRASIHD Solomon

## 2024-11-18 NOTE — OUTREACH NOTE
Detail Level: Detailed Medical Week 1 Survey      Flowsheet Row Responses   [facility] patient discharged from? [place]   Does the patient have one of the following disease processes/diagnoses(primary or secondary)? Other   Week 1 attempt successful? No   Unsuccessful attempts Attempt 1   Revoke Readmitted            Faviola SEN - Registered Nurse

## 2024-11-18 NOTE — PROGRESS NOTES
[facility] Medicine Services  ADMISSION FOLLOW-UP NOTE          Patient admitted after midnight, H&P by my partner performed earlier on today's date reviewed.  Interim findings, labs, and charting also reviewed.        The [facility] Hospital Problem List has been managed and updated to include any new diagnoses:  Active Hospital Problems    Diagnosis  POA    **Pneumonia of both lower lobes [J18.9]  Yes    Post-ERCP acute pancreatitis, ERCP 11/9/2024 [K91.89, K85.90]  Yes    Left flank pain [R10.9]  Yes    Status post cholecystectomy, 11/10/2024 [Z90.49]  Not Applicable    Lower extremity edema [R60.0]  Unknown    Mood disorder [F39]  Yes      Resolved Hospital Problems   No resolved problems to display.     Patient most complaining of pain in her left chest.  She also is having a lot of swelling in both of her legs right side greater than the left.  This has seemed to get worse that she has been at home.    On labs patient has significant bandemia with high CRP but low Procal.  Unclear etiology at this point.    ADDITIONAL PLAN:  - detailed assessment and plan from admission reviewed  -  L flank pain  BLL pneumonia  - CT a/p findings c/w BLL pneumonia  - L flank pain worse with deep breath and with activity  - blood cultures x 2  -Pro-Adrian normal, CRP elevated  - zosyn / doxycycline  - duo nebs scheduled  - IS / pulmonary toilet / mobilize patient  -Positive for enterovirus/rhinovirus  -She is point tender on her left lower chest area.  Chest x-ray does look a little easier on her left lower lung, but the chest x-ray also is a poor film with poor inspiration.  Will add a lidocaine patch to the area and get a ultrasound to look for possible larger effusion than what we are seeing.  Trying to avoid getting a CT chest at this time as she has had multiple scans with radiation.     ERCP induced pancreatitis (11/9)  S/p lap janell (11/10)  - CT a/p still with extensive inflammatory changes suggestive  of acute pancreatitis, similar to previous  - tolerating PO diet, advancing her diet slowly  - consider GI consult if worsening symptoms / LFT's increased  - pain control prn   - nausea control prn     Lower extremity edema  - monitor closely  - daily weights  - strict I/o   -Doppler lower extremities are negative for DVT    Expected Discharge   Expected Discharge Date: 11/19/2024; Expected Discharge Time:      Samira Mcdaniel MD  11/18/24

## 2024-11-18 NOTE — H&P
[facility] Medicine Services  HISTORY AND PHYSICAL    Patient Name: Eder Wills  : 2000  MRN: [id number]  Primary Care Physician: Eva Tovar MD  Date of admission: 2024    Subjective   Subjective     Chief Complaint:  L flank pain    HPI:  Eder Wills is a 24 y.o. female with PMHx of ADHD, mood disorder and s/p ERCP w/ biliary sphincterotomy, balloon sweep 2024 and s/p lap janell 11/10/2024 w/ ERCP induced pancreatitis (hospitalized -11/15/2024) who presents to the ED for evaluation of L flank pain. Pt states she has been doing well at home, has been up moving around, has been eating, drinking ok, having some loose stools following meals, abdomen is sore but no significant abdominal pain. She is having some ongoing shortness of breath, not much of a cough, denies fever or chills, does have BLE edema which started during her recent admission but has been worse the past few days. She has cut her metoprolol in half d/t her blood pressure being low (this was started during her recent admission for tachycardia). No nausea, vomiting. No drainage from her surgical incisions.    CT abd/pelvis w/ contrast tonight w/o significant change, findings c/w acute pancreatitis w/ extensive inflammatory changes surrounding the pancreas and extending along the retroperitoneum, similar to prior study w/o drainable fluid collection, no evidence of pancreatic ductal dilation; also w/ bilateral lower lobe atelectasis vs pneumonia, small bilateral pleural effusions. Pertinent labs: ; albumin 2.8; triglycerides 151; CRP 30.11, lactate 0.9, lipase 76 (previously 2820 on 11/10/2024); WBC 28.63 (up from 18.55 11/15/2024); Hgb 9.9 (down from 15.8 11/10/2024). Vitals are stable, she is afebrile and on room air. She will be admitted to the hospital medicine service for further evaluation and treatment.    Review of Systems   Constitutional:  Negative for chills and  fever.   Respiratory:  Positive for shortness of breath. Negative for cough and wheezing.    Cardiovascular:  Positive for leg swelling. Negative for chest pain.   Genitourinary:  Positive for flank pain.   All other systems reviewed and are negative.                Personal History     Past Medical History:   Diagnosis Date    Obesity (BMI 30-39.9) 11/11/2024             Past Surgical History:   Procedure Laterality Date    ADENOIDECTOMY  2008    CHOLECYSTECTOMY N/A 11/10/2024    Procedure: CHOLECYSTECTOMY LAPAROSCOPIC;  Surgeon: Raheem Raza MD;  Location: [facility] OR;  Service: General;  Laterality: N/A;    COSMETIC SURGERY      2021    ERCP N/A 11/9/2024    Procedure: ENDOSCOPIC RETROGRADE CHOLANGIOPANCREATOGRAPHY;  Surgeon: Dick Patrick MD;  Location: [facility] ENDOSCOPY;  Service: Gastroenterology;  Laterality: N/A;    TONSILLECTOMY  2008       Family History:   family history includes No Known Problems in her father and mother.     Social History:  reports that she has never smoked. She has never used smokeless tobacco. She reports current alcohol use. She reports that she does not use drugs.  Social History     Social History Narrative    Not on file       Medications:  FLUoxetine, amphetamine-dextroamphetamine XR, and metoprolol tartrate    No Known Allergies    Objective   Objective     Vital Signs:   Temp:  [98.4 °F (36.9 °C)] 98.4 °F (36.9 °C)  Heart Rate:  [85-93] 85  Resp:  [20] 20  BP: (107-117)/(66-79) 117/71    Physical Exam  Constitutional:       General: She is not in acute distress.     Appearance: She is well-developed. She is ill-appearing. She is not toxic-appearing.   HENT:      Head: Normocephalic and atraumatic.      Nose: Nose normal.      Mouth/Throat:      Pharynx: Oropharynx is clear.   Eyes:      Extraocular Movements: Extraocular movements intact.      Conjunctiva/sclera: Conjunctivae normal.      Pupils: Pupils are equal, round, and reactive to light.   Cardiovascular:      Rate  and Rhythm: Normal rate and regular rhythm.      Pulses: Normal pulses.      Heart sounds: Normal heart sounds. No murmur heard.  Pulmonary:      Effort: Pulmonary effort is normal. No respiratory distress.      Breath sounds: Decreased air movement (LLL posteriorly) present. Examination of the left-lower field reveals rales. Rales present.   Abdominal:      General: Bowel sounds are normal. There is no distension.      Palpations: Abdomen is soft.      Tenderness: There is no abdominal tenderness.      Comments: Post op janell incisions w/ dressing CDI   Musculoskeletal:         General: Normal range of motion.      Cervical back: Normal range of motion and neck supple.      Right lower le+ Pitting Edema present.      Left lower le+ Pitting Edema present.   Skin:     General: Skin is warm and dry.      Capillary Refill: Capillary refill takes less than 2 seconds.      Coloration: Skin is pale.   Neurological:      Mental Status: She is alert and oriented to person, place, and time.      Cranial Nerves: No cranial nerve deficit.   Psychiatric:         Mood and Affect: Mood normal.         Behavior: Behavior normal.             Result Review:  I have personally reviewed the results from the time of this admission to 2024 04:40 EST and agree with these findings:  [x]  Laboratory list / accordion  [x]  Microbiology  [x]  Radiology  [x]  EKG/Telemetry   []  Cardiology/Vascular   []  Pathology  []  Old records  []  Other:  Most notable findings include:      LAB RESULTS:      Lab 24  0120 11/15/24  0859 24  0357 24  0610 24  0033 24  1210 24  0401 24  1632 24  1239   WBC 28.63* 18.55* 15.81* 18.00*  --   --  23.38*  --  33.78*   HEMOGLOBIN 9.9* 10.5* 9.6* 10.8* 11.0*   < > 11.6*  --  15.8   HEMATOCRIT 30.3* 32.6* 29.1* 33.2* 33.5*   < > 35.0  --  48.9*   PLATELETS 486* 392 319 288  --   --  288  --  465*   NEUTROS ABS 22.25* 13.99* 12.75*  --   --   --  20.48*   --  29.97*   IMMATURE GRANS (ABS) 1.68* 0.75* 0.14*  --   --   --  0.18*  --  0.30*   LYMPHS ABS 2.50 1.97 1.49  --   --   --  1.21  --  1.22   MONOS ABS 1.97* 1.63* 1.33*  --   --   --  1.48*  --  2.21*   EOS ABS 0.15 0.16 0.07  --   --   --  0.00  --  0.00   MCV 92.4 93.4 92.1 93.3  --   --  90.9  --  94.0   CRP 30.11*  --   --   --   --   --   --   --   --    PROCALCITONIN 0.20  --   --   --   --   --   --   --   --    LACTATE 0.9  --   --   --   --   --  1.0 1.9  --    LDH  --   --   --   --   --   --   --   --  366*    < > = values in this interval not displayed.         Lab 11/18/24  0120 11/15/24  0859 11/14/24  1358 11/14/24  0855 11/14/24  0357 11/13/24  1647 11/13/24  0610 11/13/24  0032 11/12/24  1808 11/12/24  0401 11/11/24  1632 11/11/24  1239   SODIUM 136 138  --   --  138  --  134*  --  136 134*  --   --    POTASSIUM 4.1 4.1 4.8  --  3.5  --  3.9  --  4.2 3.6  --   --    CHLORIDE 101 105  --   --  104  --  102  --  104 101  --   --    CO2 24.0 22.0  --   --  23.0  --  23.0  --  24.0 23.0  --   --    ANION GAP 11.0 11.0  --   --  11.0  --  9.0  --  8.0 10.0  --   --    BUN 5* 7  --   --  7  --  7  --  7 8  --   --    CREATININE 0.45* 0.35*  --   --  0.41*  --  0.42*  --  0.43* 0.51*  --   --    EGFR 139.7 148.4  --   --  143.8  --  142.9  --  142.1 136.4  --   --    GLUCOSE 107* 97  --   --  102*  --  100*  --  130* 116*  --   --    CALCIUM 8.2* 7.9*  --   --  7.3*  --  7.8*  --  7.6* 7.6*  --   --    IONIZED CALCIUM  --   --   --   --   --   --   --   --   --  1.04* 1.01*  --    MAGNESIUM  --  1.9  --  2.4 1.9  --  2.1  --  2.6 1.7  --    < >   PHOSPHORUS  --  2.5 1.7*  --  1.9* 1.7* 2.4*   < > 2.0* 1.0*  --    < >    < > = values in this interval not displayed.         Lab 11/18/24  0120 11/14/24  0357 11/13/24  0610 11/12/24  0401 11/11/24  1239   TOTAL PROTEIN 5.8* 4.8* 5.0* 4.6* 4.7*   ALBUMIN 2.8* 2.7* 2.8* 3.4* 2.9*   GLOBULIN 3.0 2.1 2.2 1.2 1.8   ALT (SGPT) 31 54* 71* 81* 136*   AST (SGOT) 32  29 24 24 30   BILIRUBIN 0.7 1.1 1.4* 1.7* 1.5*   ALK PHOS 195* 135* 126* 73 137*   LIPASE 76* 39 134* 607*  --              Lab 11/18/24  0120   TRIGLYCERIDES 151*             Brief Urine Lab Results  (Last result in the past 365 days)        Color   Clarity   Blood   Leuk Est   Nitrite   Protein   CREAT   Urine HCG        11/18/24 0137               Negative             Microbiology Results (last 10 days)       Procedure Component Value - Date/Time    Blood Culture - Blood, Hand, Left [[id number]]  (Normal) Collected: 11/11/24 1632    Lab Status: Final result Specimen: Blood from Hand, Left Updated: 11/16/24 1700     Blood Culture No growth at 5 days    Blood Culture - Blood, Hand, Left [[id number]]  (Normal) Collected: 11/11/24 1240    Lab Status: Final result Specimen: Blood from Hand, Left Updated: 11/16/24 1316     Blood Culture No growth at 5 days    Narrative:      Less than seven (7) mL's of blood was collected.  Insufficient quantity may yield false negative results.    Blood Culture - Blood, Hand, Left [[id number]]  (Normal) Collected: 11/08/24 0530    Lab Status: Final result Specimen: Blood from Hand, Left Updated: 11/13/24 0600     Blood Culture No growth at 5 days    Narrative:      Less than seven (7) mL's of blood was collected.  Insufficient quantity may yield false negative results.    Blood Culture - Blood, Arm, Left [[id number]]  (Normal) Collected: 11/08/24 0525    Lab Status: Final result Specimen: Blood from Arm, Left Updated: 11/13/24 0600     Blood Culture No growth at 5 days    Narrative:      Less than seven (7) mL's of blood was collected.  Insufficient quantity may yield false negative results.            XR Chest 1 View    Result Date: 11/18/2024  XR CHEST 1 VW Date of Exam: 11/18/2024 4:11 AM EST Indication: leukocytosis / swelling Comparison: 11/11/2024. Findings: There are no airspace consolidations. Probable small effusions present, left greater than right.. No pneumothorax. The  pulmonary vasculature appears indistinct. The heart appears enlarged, stable.. No acute osseous abnormality identified.     Impression: Impression: Mild pulmonary edema pattern with probable small effusions, similar as compared to the previous study. Electronically Signed: Irma Schaffer MD  11/18/2024 4:36 AM EST  Workstation ID: [id number]    CT Abdomen Pelvis With Contrast    Result Date: 11/18/2024  CT ABDOMEN PELVIS W CONTRAST Date of Exam: 11/18/2024 2:34 AM EST Indication: Upper abdominal pain, recent biliary obstruction and pancreatitis. Comparison: 11/11/2024. Technique: Axial CT images were obtained of the abdomen and pelvis following the uneventful intravenous administration of intravenous contrast. Reconstructed coronal and sagittal images were also obtained. Automated exposure control and iterative construction methods were used. Findings: Lung Bases:   Trace right-sided pleural effusion and small left-sided pleural effusion present, improved as compared to the previous study. Airspace disease present within the bilateral lower lobes with consolidative changes and air bronchograms, mildly progressed as compared to the previous study. Liver: Liver is normal in size and CT density. No focal lesions. Biliary/Gallbladder:  The gallbladder has been resected.. The biliary tree is nondilated. Spleen: Spleen is normal in size and CT density. Pancreas:  The pancreas appears edematous. Extensive inflammatory changes are seen surrounding the pancreas and extending along the perirenal area, the stomach the portal region and tracking along the retroperitoneum, similar as compared to the previous study. No definite focal collection identified. No evidence of pockets of air. No drainable collection. Small amount of free fluid is present extending within the pelvis, similar as compared to the previous study. No evidence of pancreatic ductal dilatation. Kidneys:  Kidneys are normal in size. There are no stones or  hydronephrosis. Adrenals:  Adrenal glands are unremarkable. Retroperitoneal/Lymph Nodes/Vasculature:  No retroperitoneal adenopathy is identified. Gastrointestinal/Mesentery:  The bowel loops are non-dilated without wall thickening or mass. The appendix appears within normal limits. No evidence of obstruction. No free air. No mesenteric fluid collections identified. No significant stool burden. No evidence of hernia. There is anasarca of the soft tissues. Bladder:  The bladder is normal. Genital:   Unremarkable       Bony Structures:   Visualized bony structures are consistent with the patient's age.     Impression: Impression: 1.No significant change. Findings consistent with acute pancreatitis with extensive inflammatory changes surrounding the pancreas and extending along the retroperitoneum, similar as compared to the previous study. No drainable collection identified. No evidence of pancreatic ductal dilatation. 2.Bilateral lower lobe atelectasis versus pneumonia, mildly progressed as compared to the previous study. Small bilateral pleural effusions, improved as compared to the previous study. 3.Ancillary findings as described above. Electronically Signed: Irma Schaffer MD  11/18/2024 3:02 AM EST  Workstation ID: [id number]         Assessment & Plan   Assessment & Plan       Pneumonia of both lower lobes    Mood disorder    Post-ERCP acute pancreatitis, ERCP 11/9/2024    Left flank pain    Status post cholecystectomy, 11/10/2024    Lower extremity edema    L flank pain  BLL pneumonia  - CT a/p findings c/w BLL pneumonia  - L flank pain worse with deep breath and with activity  - blood cultures x 2  - add procal to labs, CRP elevated  - zosyn / doxycycline  - duo nebs scheduled  - IS / pulmonary toilet / mobilize patient  - urine ag for s.pneumo / legionella  - respiratory pcr / mrsa pcr nares  - prn pain control    ERCP induced pancreatitis (11/9)  S/p lap janell (11/10)  - CT a/p still with extensive inflammatory  changes suggestive of acute pancreatitis, similar to previous  - tolerating PO diet, start with clear liquids and likely advance to GI soft diet later today if no vomiting / increased pain  - consider GI consult if worsening symptoms / LFT's increased  - pain control prn   - nausea control prn    Lower extremity edema  - monitor closely  - daily weights  - strict I/o         DVT prophylaxis:  SCDS    CODE STATUS:     Code Status (Patient has no pulse and is not breathing): CPR (Attempt to Resuscitate)  Medical Interventions (Patient has pulse or is breathing): Full Support      Expected Discharge     Expected Discharge Date: 11/19/2024; Expected Discharge Time:       This note has been completed as part of a split-shared workflow.     Signature: Electronically signed by MARITZA Crawford, 11/18/24, 4:40 AM EST    Total APC time: 40 minutes  Patient seen and examined at the bedside.  Patient is a 24-year-old  female with past medical history significant for mood disorder, ADHD.  Patient was recently hospitalized here at [facility] and had ERCP and sphincterotomy done.  Also had laparoscopic cholecystectomy.  Patient developed ERCP induced pancreatitis and was discharged home on 11/15/2024.  Patient comes back with a complaint of left flank pain, and shortness of breath.  Patient does not have much of abdominal pain.  Patient also complains of occasional cough with phlegm.  She has been eating and tolerating her p.o. diet with no difficulty.  Evaluation at the emergency room shows worsening leukocytosis.  CT scan of abdomen pelvis shows significant edema of pancreas but it is improving.  It also shows lower lobe infiltrate and also left pleural effusion.  Patient denies any fever or chills.  No chest pain or palpitation.  No nausea or vomiting.    Physical exam:  Constitutional: No acute distress, awake, alert  HENT: NCAT, mucous membranes moist  Respiratory: Clear to auscultation bilaterally,  decreased breath sounds on the left lower lobe, respiratory effort normal   Cardiovascular: RRR, no murmurs, rubs, or gallops  Gastrointestinal: Positive bowel sounds, soft, nontender, nondistended  Musculoskeletal:  bilateral ankle edema  Psychiatric: Appropriate affect, cooperative  Neurologic: Oriented x 3, strength symmetric in all extremities, Cranial Nerves grossly intact to confrontation, speech clear  Skin: No rashes   Assessment and plan:  24-year-old female with past medical history of mood disorder and ADHD.  Patient was recently hospitalized here at [facility] and had ERCP with sphincterotomy and also lap janell.  Patient developed ERCP induced pancreatitis.  Patient comes back with complaint of cough, some shortness of breath particularly exertional, left flank pain.  CT scan shows lower lobe infiltrate and also left pleural effusion.  Will admit the patient to telemetry and also continue treatment with antibiotics.  Please see the above for more details.  Patient will be admitted as inpatient.    Total time spent was 60 minutes.

## 2024-11-19 LAB
BASOPHILS # BLD AUTO: 0.07 10*3/MM3 (ref 0–0.2)
BASOPHILS NFR BLD AUTO: 0.3 % (ref 0–1.5)
DEPRECATED RDW RBC AUTO: 50.4 FL (ref 37–54)
EOSINOPHIL # BLD AUTO: 0.13 10*3/MM3 (ref 0–0.4)
EOSINOPHIL NFR BLD AUTO: 0.6 % (ref 0.3–6.2)
ERYTHROCYTE [DISTWIDTH] IN BLOOD BY AUTOMATED COUNT: 14.5 % (ref 12.3–15.4)
HCT VFR BLD AUTO: 30.7 % (ref 34–46.6)
HGB BLD-MCNC: 9.6 G/DL (ref 12–15.9)
IMM GRANULOCYTES # BLD AUTO: 1.06 10*3/MM3 (ref 0–0.05)
IMM GRANULOCYTES NFR BLD AUTO: 4.5 % (ref 0–0.5)
LYMPHOCYTES # BLD AUTO: 2 10*3/MM3 (ref 0.7–3.1)
LYMPHOCYTES NFR BLD AUTO: 8.5 % (ref 19.6–45.3)
MCH RBC QN AUTO: 29.4 PG (ref 26.6–33)
MCHC RBC AUTO-ENTMCNC: 31.3 G/DL (ref 31.5–35.7)
MCV RBC AUTO: 93.9 FL (ref 79–97)
MONOCYTES # BLD AUTO: 1.45 10*3/MM3 (ref 0.1–0.9)
MONOCYTES NFR BLD AUTO: 6.2 % (ref 5–12)
NEUTROPHILS NFR BLD AUTO: 18.75 10*3/MM3 (ref 1.7–7)
NEUTROPHILS NFR BLD AUTO: 79.9 % (ref 42.7–76)
NRBC BLD AUTO-RTO: 0 /100 WBC (ref 0–0.2)
PLATELET # BLD AUTO: 536 10*3/MM3 (ref 140–450)
PMV BLD AUTO: 9.5 FL (ref 6–12)
RBC # BLD AUTO: 3.27 10*6/MM3 (ref 3.77–5.28)
WBC NRBC COR # BLD AUTO: 23.46 10*3/MM3 (ref 3.4–10.8)

## 2024-11-19 PROCEDURE — 99232 SBSQ HOSP IP/OBS MODERATE 35: CPT | Performed by: PEDIATRICS

## 2024-11-19 PROCEDURE — 99221 1ST HOSP IP/OBS SF/LOW 40: CPT | Performed by: PHYSICIAN ASSISTANT

## 2024-11-19 PROCEDURE — 94761 N-INVAS EAR/PLS OXIMETRY MLT: CPT

## 2024-11-19 PROCEDURE — 25010000002 PIPERACILLIN SOD-TAZOBACTAM PER 1 G: Performed by: NURSE PRACTITIONER

## 2024-11-19 PROCEDURE — 94799 UNLISTED PULMONARY SVC/PX: CPT

## 2024-11-19 PROCEDURE — 85025 COMPLETE CBC W/AUTO DIFF WBC: CPT | Performed by: PEDIATRICS

## 2024-11-19 PROCEDURE — 94664 DEMO&/EVAL PT USE INHALER: CPT

## 2024-11-19 RX ORDER — DIAPER,BRIEF,INFANT-TODD,DISP
1 EACH MISCELLANEOUS EVERY 12 HOURS SCHEDULED
Status: DISCONTINUED | OUTPATIENT
Start: 2024-11-19 | End: 2024-11-20 | Stop reason: HOSPADM

## 2024-11-19 RX ORDER — DOXYCYCLINE 100 MG/1
100 CAPSULE ORAL EVERY 12 HOURS SCHEDULED
Status: DISCONTINUED | OUTPATIENT
Start: 2024-11-19 | End: 2024-11-19

## 2024-11-19 RX ADMIN — HYDROCODONE BITARTRATE AND ACETAMINOPHEN 1 TABLET: 5; 325 TABLET ORAL at 07:07

## 2024-11-19 RX ADMIN — IPRATROPIUM BROMIDE AND ALBUTEROL SULFATE 3 ML: 2.5; .5 SOLUTION RESPIRATORY (INHALATION) at 12:03

## 2024-11-19 RX ADMIN — FAMOTIDINE 40 MG: 20 TABLET, FILM COATED ORAL at 09:15

## 2024-11-19 RX ADMIN — DOXYCYCLINE 100 MG: 100 INJECTION, POWDER, LYOPHILIZED, FOR SOLUTION INTRAVENOUS at 05:49

## 2024-11-19 RX ADMIN — LIDOCAINE 1 PATCH: 4 PATCH TOPICAL at 10:13

## 2024-11-19 RX ADMIN — PIPERACILLIN AND TAZOBACTAM 3.38 G: 3; .375 INJECTION, POWDER, LYOPHILIZED, FOR SOLUTION INTRAVENOUS at 12:41

## 2024-11-19 RX ADMIN — PIPERACILLIN AND TAZOBACTAM 3.38 G: 3; .375 INJECTION, POWDER, LYOPHILIZED, FOR SOLUTION INTRAVENOUS at 04:31

## 2024-11-19 RX ADMIN — BACITRACIN 0.9 G: 500 OINTMENT TOPICAL at 20:39

## 2024-11-19 RX ADMIN — IPRATROPIUM BROMIDE AND ALBUTEROL SULFATE 3 ML: 2.5; .5 SOLUTION RESPIRATORY (INHALATION) at 07:42

## 2024-11-19 RX ADMIN — HYDROCODONE BITARTRATE AND ACETAMINOPHEN 1 TABLET: 5; 325 TABLET ORAL at 18:41

## 2024-11-19 RX ADMIN — BACITRACIN 0.9 G: 500 OINTMENT TOPICAL at 13:38

## 2024-11-19 RX ADMIN — IPRATROPIUM BROMIDE AND ALBUTEROL SULFATE 3 ML: 2.5; .5 SOLUTION RESPIRATORY (INHALATION) at 16:20

## 2024-11-19 RX ADMIN — FLUOXETINE HYDROCHLORIDE 40 MG: 20 CAPSULE ORAL at 09:15

## 2024-11-19 RX ADMIN — IPRATROPIUM BROMIDE AND ALBUTEROL SULFATE 3 ML: 2.5; .5 SOLUTION RESPIRATORY (INHALATION) at 20:54

## 2024-11-19 RX ADMIN — Medication 10 ML: at 09:16

## 2024-11-19 RX ADMIN — Medication 5 MG: at 22:50

## 2024-11-19 NOTE — PROGRESS NOTES
Continued Stay Note  [facility]     Patient Name: Eder Wills  MRN: [id number]  Today's Date: 11/19/2024    Admit Date: 11/18/2024        Discharge Plan       Row Name 11/19/24 1541       Plan    Plan Comments Case management is following for discharge planning needs if there are any needs at discharge that the patient will have.                   Discharge Codes    No documentation.                 Expected Discharge Date and Time       Expected Discharge Date Expected Discharge Time    Nov 19, 2024               ABDIRASHID Solomon

## 2024-11-19 NOTE — PLAN OF CARE
Problem: Adult Inpatient Plan of Care  Goal: Plan of Care Review  Outcome: Progressing  Goal: Patient-Specific Goal (Individualized)  Outcome: Progressing  Goal: Absence of Hospital-Acquired Illness or Injury  Outcome: Progressing  Intervention: Identify and Manage Fall Risk  Recent Flowsheet Documentation  Taken 11/19/2024 0420 by Ambrocio Guajardo RN  Safety Promotion/Fall Prevention:   activity supervised   assistive device/personal items within reach   clutter free environment maintained   toileting scheduled   safety round/check completed   room organization consistent   nonskid shoes/slippers when out of bed  Taken 11/19/2024 0200 by Ambrocio Guajardo, RN  Safety Promotion/Fall Prevention:   activity supervised   assistive device/personal items within reach   clutter free environment maintained   toileting scheduled   safety round/check completed   room organization consistent   nonskid shoes/slippers when out of bed  Taken 11/19/2024 0000 by Ambrocio Guajardo RN  Safety Promotion/Fall Prevention:   activity supervised   clutter free environment maintained   assistive device/personal items within reach   toileting scheduled   safety round/check completed   room organization consistent   nonskid shoes/slippers when out of bed  Taken 11/18/2024 2158 by Ambrocio Guajardo RN  Safety Promotion/Fall Prevention:   activity supervised   assistive device/personal items within reach   clutter free environment maintained   toileting scheduled   safety round/check completed   room organization consistent   nonskid shoes/slippers when out of bed  Taken 11/18/2024 2043 by Ambrocio Guajardo, RN  Safety Promotion/Fall Prevention:   activity supervised   clutter free environment maintained   assistive device/personal items within reach   toileting scheduled   safety round/check completed   room organization consistent   nonskid shoes/slippers when out of bed  Intervention: Prevent Skin Injury  Recent Flowsheet  Documentation  Taken 11/19/2024 0420 by Ambrocio Guajardo RN  Body Position: position changed independently  Skin Protection: transparent dressing maintained  Taken 11/19/2024 0200 by Ambrocio Guajardo RN  Body Position: position changed independently  Skin Protection: transparent dressing maintained  Taken 11/19/2024 0000 by Ambrocio Guajardo RN  Body Position: position changed independently  Skin Protection: transparent dressing maintained  Taken 11/18/2024 2158 by Ambrocio Guajardo RN  Body Position: position changed independently  Skin Protection: transparent dressing maintained  Taken 11/18/2024 2043 by Ambrocio Guajardo RN  Body Position: position changed independently  Skin Protection: transparent dressing maintained  Intervention: Prevent Infection  Recent Flowsheet Documentation  Taken 11/19/2024 0420 by Ambrocio Guajardo RN  Infection Prevention:   cohorting utilized   environmental surveillance performed   equipment surfaces disinfected   hand hygiene promoted   rest/sleep promoted   single patient room provided  Taken 11/19/2024 0200 by Ambrocio Guajardo RN  Infection Prevention:   cohorting utilized   environmental surveillance performed   equipment surfaces disinfected   hand hygiene promoted   rest/sleep promoted   single patient room provided  Taken 11/19/2024 0000 by Ambrocio Guajardo RN  Infection Prevention:   cohorting utilized   environmental surveillance performed   equipment surfaces disinfected   hand hygiene promoted   rest/sleep promoted   single patient room provided  Taken 11/18/2024 2158 by Ambrocio Guajardo RN  Infection Prevention:   cohorting utilized   environmental surveillance performed   equipment surfaces disinfected   hand hygiene promoted   rest/sleep promoted   single patient room provided  Taken 11/18/2024 2043 by Ambrocio Guajardo RN  Infection Prevention:   cohorting utilized   environmental surveillance performed   equipment surfaces disinfected   hand hygiene  promoted   rest/sleep promoted   single patient room provided  Goal: Optimal Comfort and Wellbeing  Outcome: Progressing  Intervention: Monitor Pain and Promote Comfort  Recent Flowsheet Documentation  Taken 11/18/2024 2158 by Ambrocio Guajardo RN  Pain Management Interventions: pain medication given  Taken 11/18/2024 2043 by Ambrocio Guajardo RN  Pain Management Interventions: pain medication given  Intervention: Provide Person-Centered Care  Recent Flowsheet Documentation  Taken 11/18/2024 2043 by Ambrocio Guajardo RN  Trust Relationship/Rapport:   choices provided   care explained   emotional support provided   empathic listening provided   questions answered   reassurance provided   questions encouraged   thoughts/feelings acknowledged  Goal: Readiness for Transition of Care  Outcome: Progressing     Problem: Pneumonia  Goal: Fluid Balance  Outcome: Progressing  Goal: Absence of Infection Signs and Symptoms  Outcome: Progressing  Goal: Effective Oxygenation and Ventilation  Outcome: Progressing  Intervention: Promote Airway Secretion Clearance  Recent Flowsheet Documentation  Taken 11/19/2024 0420 by Ambrocio Guajardo RN  Activity Management: activity minimized  Taken 11/19/2024 0200 by Ambrocio Guajardo RN  Activity Management: activity minimized  Taken 11/19/2024 0000 by Ambrocio Guajardo RN  Activity Management: activity minimized  Taken 11/18/2024 2158 by Ambrocio Guajardo RN  Activity Management: activity minimized  Taken 11/18/2024 2043 by Ambrocio Guajardo RN  Activity Management: activity minimized  Cough And Deep Breathing: done independently per patient  Intervention: Optimize Oxygenation and Ventilation  Recent Flowsheet Documentation  Taken 11/19/2024 0420 by Ambrocio Guajardo RN  Head of Bed (HOB) Positioning: HOB elevated  Taken 11/19/2024 0200 by Ambrocio Guajardo RN  Head of Bed (HOB) Positioning: HOB elevated  Taken 11/19/2024 0000 by Ambrocio Guajardo RN  Head of Bed ([facility])  Positioning: HOB elevated  Taken 11/18/2024 2158 by Ambrocio Guajardo RN  Head of Bed (HOB) Positioning: HOB elevated  Taken 11/18/2024 2043 by Ambrocio Guajardo RN  Head of Bed (HOB) Positioning: HOB elevated     Problem: Sepsis/Septic Shock  Goal: Optimal Coping  Outcome: Progressing  Intervention: Support Patient and Family Response  Recent Flowsheet Documentation  Taken 11/18/2024 2043 by Ambrocio Guajardo RN  Family/Support System Care:   support provided   self-care encouraged  Goal: Absence of Bleeding  Outcome: Progressing  Goal: Blood Glucose Level Within Target Range  Outcome: Progressing  Goal: Absence of Infection Signs and Symptoms  Outcome: Progressing  Intervention: Initiate Sepsis Management  Recent Flowsheet Documentation  Taken 11/19/2024 0420 by Ambrocio Guajardo RN  Infection Prevention:   cohorting utilized   environmental surveillance performed   equipment surfaces disinfected   hand hygiene promoted   rest/sleep promoted   single patient room provided  Taken 11/19/2024 0200 by Ambrocio Guajardo RN  Infection Prevention:   cohorting utilized   environmental surveillance performed   equipment surfaces disinfected   hand hygiene promoted   rest/sleep promoted   single patient room provided  Taken 11/19/2024 0000 by Ambrocio Guajardo RN  Infection Prevention:   cohorting utilized   environmental surveillance performed   equipment surfaces disinfected   hand hygiene promoted   rest/sleep promoted   single patient room provided  Taken 11/18/2024 2158 by Ambrocio Guajardo RN  Infection Prevention:   cohorting utilized   environmental surveillance performed   equipment surfaces disinfected   hand hygiene promoted   rest/sleep promoted   single patient room provided  Taken 11/18/2024 2043 by Ambrocio Guajardo RN  Infection Prevention:   cohorting utilized   environmental surveillance performed   equipment surfaces disinfected   hand hygiene promoted   rest/sleep promoted   single patient room  provided  Intervention: Promote Recovery  Recent Flowsheet Documentation  Taken 11/19/2024 0420 by Ambrocio Guajardo, RN  Activity Management: activity minimized  Taken 11/19/2024 0200 by Ambrocio Guajardo, RN  Activity Management: activity minimized  Taken 11/19/2024 0000 by Ambrocio Guajardo, RN  Activity Management: activity minimized  Taken 11/18/2024 2158 by Ambrocio Guajardo, RN  Activity Management: activity minimized  Taken 11/18/2024 2043 by Ambrocio Guajardo, RN  Activity Management: activity minimized  Goal: Optimal Nutrition Delivery  Outcome: Progressing   Goal Outcome Evaluation:

## 2024-11-19 NOTE — CONSULTS
[facility] Consult    Referring Provider:  Samira Mcadniel MD     PCP: Eva Tovar MD    Reason for Consultation: Pancreatitis     Chief complaint: Left flank pain     History of present illness:    Eder Wills is a 24 y.o. female who is admitted with left flank pain.   She was recently discharged from our facility four days ago after admission for choledocholithiasis with biliary obstruction s/p ERCP on 11/9/24 with Dr. Patrick, cholecystectomy with Dr. Raza on 11/10 and post ERCP pancreatitis.   Hospital course complicated by SIRS secondary to her severe pancreatitis with ascites, effusions and atelectasis.   She was clinically improving at time of discharge and tolerating PO intake.   Her BRUNO drain was removed.       She states she developed sharp left sided flank pain after returning home.   It is unrelated to meals.  It is worsened by movement.       She states her PO intake continues to improve.  She denies nausea nor vomiting.   She states she had a little trouble with constipation at home but that is now improved.       Labs on arrival showed leukocytosis with WBC of 28K.      Allergies:  Patient has no known allergies.    Scheduled Meds:  bacitracin, 1 Application, Topical, Q12H  doxycycline, 100 mg, Oral, Q12H  famotidine, 40 mg, Oral, Daily  FLUoxetine, 40 mg, Oral, Daily  ipratropium-albuterol, 3 mL, Nebulization, 4x Daily - RT  Lidocaine, 1 patch, Transdermal, Q24H  piperacillin-tazobactam, 3.375 g, Intravenous, Q8H  sodium chloride, 10 mL, Intravenous, Q12H         Infusions:       PRN Meds:    acetaminophen **OR** acetaminophen **OR** acetaminophen    senna-docusate sodium **AND** polyethylene glycol **AND** bisacodyl **AND** bisacodyl    Calcium Replacement - Follow Nurse / BPA Driven Protocol    HYDROcodone-acetaminophen    HYDROmorphone **AND** naloxone    Magnesium Standard Dose Replacement - Follow Nurse / BPA Driven Protocol    melatonin    nitroglycerin     ondansetron ODT **OR** ondansetron    Phosphorus Replacement - Follow Nurse / BPA Driven Protocol    Potassium Replacement - Follow Nurse / BPA Driven Protocol    Sodium Chloride (PF)    sodium chloride    sodium chloride    Home Meds:  Medications Prior to Admission   Medication Sig Dispense Refill Last Dose/Taking    amphetamine-dextroamphetamine XR (ADDERALL XR) 30 MG 24 hr capsule Take 1 capsule by mouth Daily   Past Week    FLUoxetine (PROzac) 40 MG capsule Take 1 capsule by mouth Daily.   Past Week    metoprolol tartrate (LOPRESSOR) 25 MG tablet Take 1 tablet by mouth 2 (Two) Times a Day for 30 days. 60 tablet 0 Past Week       ROS: Review of Systems   Constitutional:  Positive for fatigue.   Eyes: Negative.    Respiratory: Negative.     Cardiovascular: Negative.    Gastrointestinal:  Negative for abdominal pain and diarrhea.   Genitourinary:         Left flank pain    Musculoskeletal: Negative.    Skin: Negative.    Neurological: Negative.    Hematological: Negative.        PAST MED HX:  Past Medical History:   Diagnosis Date    Obesity (BMI 30-39.9) 11/11/2024       PAST SURG HX:  Past Surgical History:   Procedure Laterality Date    ADENOIDECTOMY  2008    CHOLECYSTECTOMY N/A 11/10/2024    Procedure: CHOLECYSTECTOMY LAPAROSCOPIC;  Surgeon: Raheem Raza MD;  Location:  [facility];  Service: General;  Laterality: N/A;    COSMETIC SURGERY      2021    ERCP N/A 11/9/2024    Procedure: ENDOSCOPIC RETROGRADE CHOLANGIOPANCREATOGRAPHY;  Surgeon: Dick Patrick MD;  Location:  [facility];  Service: Gastroenterology;  Laterality: N/A;    TONSILLECTOMY  2008       FAM HX:  Family History   Problem Relation Age of Onset    No Known Problems Mother     No Known Problems Father        SOC HX:  Social History     Socioeconomic History    Marital status: Single   Tobacco Use    Smoking status: Never    Smokeless tobacco: Never   Vaping Use    Vaping status: Never Used   Substance and Sexual Activity    Alcohol use:  "Yes     Comment: socially    Drug use: Never    Sexual activity: Defer       PHYSICAL EXAM  /84 (BP Location: Left arm, Patient Position: Lying)   Pulse 111   Temp 98.8 °F (37.1 °C) (Oral)   Resp 18   Ht 157.5 cm (62\")   Wt 92.1 kg (203 lb)   LMP 10/29/2024 (Approximate)   SpO2 94%   BMI 37.13 kg/m²   Wt Readings from Last 3 Encounters:   11/18/24 92.1 kg (203 lb)   11/07/24 84.4 kg (186 lb)   07/17/17 79.6 kg (175 lb 8 oz) (97%, Z= 1.85)*     * Growth percentiles are based on CDC (Girls, 2-20 Years) data.   ,body mass index is 37.13 kg/m².  Physical Exam  Constitutional:       General: She is not in acute distress.     Appearance: She is not toxic-appearing.   HENT:      Head: Normocephalic and atraumatic.   Cardiovascular:      Rate and Rhythm: Normal rate and regular rhythm.   Pulmonary:      Effort: Pulmonary effort is normal. No respiratory distress.   Abdominal:      General: Bowel sounds are normal. There is no distension.      Palpations: Abdomen is soft.      Tenderness: There is no abdominal tenderness. There is no guarding.      Comments: Clean dressing on laparoscopic incision sites    Genitourinary:     Comments: Mild tenderness of the left flank, no ecchymosis   Neurological:      Mental Status: She is alert and oriented to person, place, and time.   Psychiatric:         Behavior: Behavior normal.       Results Review:   I reviewed the patient's new clinical results.    Lab Results   Component Value Date    WBC 23.46 (H) 11/19/2024    HGB 9.6 (L) 11/19/2024    HGB 9.4 (L) 11/18/2024    HGB 9.9 (L) 11/18/2024    HCT 30.7 (L) 11/19/2024    MCV 93.9 11/19/2024     (H) 11/19/2024     Lab Results   Component Value Date    INR 1.02 11/10/2024     Lab Results   Component Value Date    GLUCOSE 100 (H) 11/18/2024    BUN 5 (L) 11/18/2024    CREATININE 0.47 (L) 11/18/2024    BCR 10.6 11/18/2024     11/18/2024    K 4.1 11/18/2024    CO2 25.0 11/18/2024    CALCIUM 8.2 (L) 11/18/2024    " ALBUMIN 2.7 (L) 11/18/2024    ALKPHOS 157 (H) 11/18/2024    BILITOT 0.7 11/18/2024    ALT 29 11/18/2024    AST 18 11/18/2024     CT abdomen/pelvis (as interpreted by radiologist)  Findings:  Lung Bases:     Trace right-sided pleural effusion and small left-sided pleural effusion present, improved as compared to the previous study. Airspace disease present within the bilateral lower lobes with consolidative changes and air bronchograms, mildly progressed as compared to the previous study.   Liver:  Liver is normal in size and CT density. No focal lesions.   Biliary/Gallbladder:    The gallbladder has been resected.. The biliary tree is nondilated.   Spleen:  Spleen is normal in size and CT density.   Pancreas:    The pancreas appears edematous. Extensive inflammatory changes are seen surrounding the pancreas and extending along the perirenal area, the stomach the portal region and tracking along the retroperitoneum, similar as compared to the previous study. No   definite focal collection identified. No evidence of pockets of air. No drainable collection. Small amount of free fluid is present extending within the pelvis, similar as compared to the previous study. No evidence of pancreatic ductal dilatation.   Kidneys:    Kidneys are normal in size. There are no stones or hydronephrosis.   Adrenals:    Adrenal glands are unremarkable.   Retroperitoneal/Lymph Nodes/Vasculature:    No retroperitoneal adenopathy is identified.   Gastrointestinal/Mesentery:    The bowel loops are non-dilated without wall thickening or mass. The appendix appears within normal limits. No evidence of obstruction. No free air. No mesenteric fluid collections identified. No significant stool burden. No evidence of hernia. There is anasarca of the soft tissues.   Bladder:    The bladder is normal.   Genital:     Unremarkable        Bony Structures:     Visualized bony structures are consistent with the patient's age.    IMPRESSION:  Impression:  1.No significant change. Findings consistent with acute pancreatitis with extensive inflammatory changes surrounding the pancreas and extending along the retroperitoneum, similar as compared to the previous study. No drainable collection identified. No   evidence of pancreatic ductal dilatation.  2.Bilateral lower lobe atelectasis versus pneumonia, mildly progressed as compared to the previous study. Small bilateral pleural effusions, improved as compared to the previous study.  3.Ancillary findings as described above.     ASSESSMENTS/PLANS    Left flank pain, suspect secondary to pancreatitis    Post ERCP pancreatitis, severe with SIRS   Rhinovirus   Bilateral lower lobe atelectasis versus pneumonia   S/p ERCP with sphincterotomy and balloon sweep on 11/9/24  S/p cholecystectomy on 11/10/24, POD # 9     CT images personally reviewed with Dr. Patrick.   Slight improvement in extensive inflammatory changes surrounding the pancreas from previous.    She appears to be slowly symptomatically improving.        >> Continue use of incentive spirometry   >> No evidence of infected pancreatitis on imaging.   IV Zosyn per primary team for pneumonia   >> Lidocaine patch and as needed PO hydrocodone     Recommend repeat CT with contrast, pancreas protocol in 4-6 weeks outpatient.      I discussed the patient's findings and my recommendations with patient, her mother and her father whom is present at the bedside.       ROXY Taylor  11/19/24  13:49 EST

## 2024-11-19 NOTE — PROGRESS NOTES
[facility] Medicine Services  PROGRESS NOTE    Patient Name: Eder Wills  : 2000  MRN: [id number]    Date of Admission: 2024  Primary Care Physician: Eva Tovar MD    Subjective   Subjective     CC:  L flank pain    HPI:  Pt overall thinks she is feeling better.  The lidocaine patch has helped some.  She is tolerating her diet.  Having bowel movements.      Objective   Objective     Vital Signs:   Temp:  [98.5 °F (36.9 °C)-98.9 °F (37.2 °C)] 98.5 °F (36.9 °C)  Heart Rate:  [] 122  Resp:  [18-20] 18  BP: (112-157)/() 127/76     Physical Exam:  Constitutional: No acute distress, awake, alert  HENT: NCAT, mucous membranes moist  Respiratory: Clear to auscultation bilaterally, respiratory effort normal, some diminished BS at bases bilaterally.  Significantly less tender on her left flank after placement of the lidocaine patch compared to yesterday.  Cardiovascular: Slightly tachycardic but regular, no murmurs, rubs, or gallops  Gastrointestinal: Positive bowel sounds, soft, nontender, nondistended  Musculoskeletal: 1-2+ bilateral ankle edema, slightly more on the right than the left  Psychiatric: Appropriate affect, cooperative  Neurologic: Oriented x 3, strength symmetric in all extremities, Cranial Nerves grossly intact to confrontation, speech clear  Skin: No rashes      Results Reviewed:  LAB RESULTS:      Lab 24  0850 24  0527 24  0120 11/15/24  0859 24  0357   WBC 23.46* 24.41* 28.63* 18.55* 15.81*   HEMOGLOBIN 9.6* 9.4* 9.9* 10.5* 9.6*   HEMATOCRIT 30.7* 29.7* 30.3* 32.6* 29.1*   PLATELETS 536* 449 486* 392 319   NEUTROS ABS 18.75* 22.46* 22.25* 13.99* 12.75*   IMMATURE GRANS (ABS) 1.06*  --  1.68* 0.75* 0.14*   LYMPHS ABS 2.00  --  2.50 1.97 1.49   MONOS ABS 1.45*  --  1.97* 1.63* 1.33*   EOS ABS 0.13 0.00 0.15 0.16 0.07   MCV 93.9 93.1 92.4 93.4 92.1   CRP  --   --  30.11*  --   --    PROCALCITONIN  --   --  0.20  --    --    LACTATE  --   --  0.9  --   --          Lab 11/18/24  0526 11/18/24  0120 11/15/24  0859 11/14/24  1358 11/14/24  0855 11/14/24  0357 11/13/24  1647 11/13/24  0610   SODIUM 137 136 138  --   --  138  --  134*   POTASSIUM 4.1 4.1 4.1 4.8  --  3.5  --  3.9   CHLORIDE 103 101 105  --   --  104  --  102   CO2 25.0 24.0 22.0  --   --  23.0  --  23.0   ANION GAP 9.0 11.0 11.0  --   --  11.0  --  9.0   BUN 5* 5* 7  --   --  7  --  7   CREATININE 0.47* 0.45* 0.35*  --   --  0.41*  --  0.42*   EGFR 138.2 139.7 148.4  --   --  143.8  --  142.9   GLUCOSE 100* 107* 97  --   --  102*  --  100*   CALCIUM 8.2* 8.2* 7.9*  --   --  7.3*  --  7.8*   MAGNESIUM 1.8 1.8 1.9  --  2.4 1.9  --  2.1   PHOSPHORUS 4.2  --  2.5 1.7*  --  1.9* 1.7* 2.4*         Lab 11/18/24  0526 11/18/24  0120 11/14/24  0357 11/13/24  0610   TOTAL PROTEIN 4.8* 5.8* 4.8* 5.0*   ALBUMIN 2.7* 2.8* 2.7* 2.8*   GLOBULIN 2.1 3.0 2.1 2.2   ALT (SGPT) 29 31 54* 71*   AST (SGOT) 18 32 29 24   BILIRUBIN 0.7 0.7 1.1 1.4*   ALK PHOS 157* 195* 135* 126*   LIPASE  --  76* 39 134*             Lab 11/18/24  0120   TRIGLYCERIDES 151*             Brief Urine Lab Results  (Last result in the past 365 days)        Color   Clarity   Blood   Leuk Est   Nitrite   Protein   CREAT   Urine HCG        11/18/24 0137               Negative               Microbiology Results Abnormal       Procedure Component Value - Date/Time    Respiratory Panel PCR w/COVID-19(SARS-CoV-2) BILLY/VANIA/MUNA/PAD/COR/MARY In-House, NP Swab in UTM/VTM, 2 HR TAT - Swab, Nasopharynx [[id number]]  (Abnormal) Collected: 11/18/24 0515    Lab Status: Final result Specimen: Swab from Nasopharynx Updated: 11/18/24 0621     ADENOVIRUS, PCR Not Detected     Coronavirus 229E Not Detected     Coronavirus HKU1 Not Detected     Coronavirus NL63 Not Detected     Coronavirus OC43 Not Detected     COVID19 Not Detected     Human Metapneumovirus Not Detected     Human Rhinovirus/Enterovirus Detected     Influenza A PCR Not  Detected     Influenza B PCR Not Detected     Parainfluenza Virus 1 Not Detected     Parainfluenza Virus 2 Not Detected     Parainfluenza Virus 3 Not Detected     Parainfluenza Virus 4 Not Detected     RSV, PCR Not Detected     Bordetella pertussis pcr Not Detected     Bordetella parapertussis PCR Not Detected     Chlamydophila pneumoniae PCR Not Detected     Mycoplasma pneumo by PCR Not Detected    Narrative:      In the setting of a positive respiratory panel with a viral infection PLUS a negative procalcitonin without other underlying concern for bacterial infection, consider observing off antibiotics or discontinuation of antibiotics and continue supportive care. If the respiratory panel is positive for atypical bacterial infection (Bordetella pertussis, Chlamydophila pneumoniae, or Mycoplasma pneumoniae), consider antibiotic de-escalation to target atypical bacterial infection.            US Chest    Result Date: 11/19/2024  US CHEST Date of Exam: 11/18/2024 6:30 PM EST Indication: left pleural effusion. Comparison: 11/11/2024 CTA chest. 11/18/2024 chest radiograph Technique: Ultrasound of the chest was performed.  Real time scanning was performed with image documentation obtained per protocol. Findings: Previous CTA chest showed large bilateral pleural effusions. Effusions are difficult to identify on 11/19/2024's chest radiograph in part due to low lung volumes but there was opacification of the left lung base. Current ultrasound shows a small to moderate left pleural effusion, and atelectasis of the lower lung.     Impression: Impression: Small to moderate remaining left pleural effusion, and associated atelectatic lower lung. Electronically Signed: Gio Domínguez MD  11/19/2024 9:15 AM EST  Workstation ID: [id number]    Duplex Venous Lower Extremity - Bilateral CAR    Result Date: 11/18/2024    Normal bilateral lower extremity venous duplex scan.     XR Chest 1 View    Result Date: 11/18/2024  XR CHEST 1 VW Date  of Exam: 11/18/2024 4:11 AM EST Indication: leukocytosis / swelling Comparison: 11/11/2024. Findings: There are no airspace consolidations. Probable small effusions present, left greater than right.. No pneumothorax. The pulmonary vasculature appears indistinct. The heart appears enlarged, stable.. No acute osseous abnormality identified.     Impression: Impression: Mild pulmonary edema pattern with probable small effusions, similar as compared to the previous study. Electronically Signed: Irma Schaffer MD  11/18/2024 4:36 AM EST  Workstation ID: [id number]    CT Abdomen Pelvis With Contrast    Result Date: 11/18/2024  CT ABDOMEN PELVIS W CONTRAST Date of Exam: 11/18/2024 2:34 AM EST Indication: Upper abdominal pain, recent biliary obstruction and pancreatitis. Comparison: 11/11/2024. Technique: Axial CT images were obtained of the abdomen and pelvis following the uneventful intravenous administration of intravenous contrast. Reconstructed coronal and sagittal images were also obtained. Automated exposure control and iterative construction methods were used. Findings: Lung Bases:   Trace right-sided pleural effusion and small left-sided pleural effusion present, improved as compared to the previous study. Airspace disease present within the bilateral lower lobes with consolidative changes and air bronchograms, mildly progressed as compared to the previous study. Liver: Liver is normal in size and CT density. No focal lesions. Biliary/Gallbladder:  The gallbladder has been resected.. The biliary tree is nondilated. Spleen: Spleen is normal in size and CT density. Pancreas:  The pancreas appears edematous. Extensive inflammatory changes are seen surrounding the pancreas and extending along the perirenal area, the stomach the portal region and tracking along the retroperitoneum, similar as compared to the previous study. No definite focal collection identified. No evidence of pockets of air. No drainable collection. Small  amount of free fluid is present extending within the pelvis, similar as compared to the previous study. No evidence of pancreatic ductal dilatation. Kidneys:  Kidneys are normal in size. There are no stones or hydronephrosis. Adrenals:  Adrenal glands are unremarkable. Retroperitoneal/Lymph Nodes/Vasculature:  No retroperitoneal adenopathy is identified. Gastrointestinal/Mesentery:  The bowel loops are non-dilated without wall thickening or mass. The appendix appears within normal limits. No evidence of obstruction. No free air. No mesenteric fluid collections identified. No significant stool burden. No evidence of hernia. There is anasarca of the soft tissues. Bladder:  The bladder is normal. Genital:   Unremarkable       Bony Structures:   Visualized bony structures are consistent with the patient's age.     Impression: Impression: 1.No significant change. Findings consistent with acute pancreatitis with extensive inflammatory changes surrounding the pancreas and extending along the retroperitoneum, similar as compared to the previous study. No drainable collection identified. No evidence of pancreatic ductal dilatation. 2.Bilateral lower lobe atelectasis versus pneumonia, mildly progressed as compared to the previous study. Small bilateral pleural effusions, improved as compared to the previous study. 3.Ancillary findings as described above. Electronically Signed: Irma Schaffer MD  11/18/2024 3:02 AM EST  Workstation ID: [id number]         Current medications:  Scheduled Meds:bacitracin, 1 Application, Topical, Q12H  famotidine, 40 mg, Oral, Daily  FLUoxetine, 40 mg, Oral, Daily  ipratropium-albuterol, 3 mL, Nebulization, 4x Daily - RT  Lidocaine, 1 patch, Transdermal, Q24H  sodium chloride, 10 mL, Intravenous, Q12H      Continuous Infusions:   PRN Meds:.  acetaminophen **OR** acetaminophen **OR** acetaminophen    senna-docusate sodium **AND** polyethylene glycol **AND** bisacodyl **AND** bisacodyl    Calcium  Replacement - Follow Nurse / BPA Driven Protocol    HYDROcodone-acetaminophen    HYDROmorphone **AND** naloxone    Magnesium Standard Dose Replacement - Follow Nurse / BPA Driven Protocol    melatonin    nitroglycerin    ondansetron ODT **OR** ondansetron    Phosphorus Replacement - Follow Nurse / BPA Driven Protocol    Potassium Replacement - Follow Nurse / BPA Driven Protocol    Sodium Chloride (PF)    sodium chloride    sodium chloride    Assessment & Plan   Assessment & Plan     Active Hospital Problems    Diagnosis  POA    **Pneumonia of both lower lobes [J18.9]  Yes    Post-ERCP acute pancreatitis, ERCP 11/9/2024 [K91.89, K85.90]  Yes    Left flank pain [R10.9]  Yes    Status post cholecystectomy, 11/10/2024 [Z90.49]  Not Applicable    Lower extremity edema [R60.0]  Unknown    Mood disorder [F39]  Yes      Resolved Hospital Problems   No resolved problems to display.        Brief Hospital Course to date:  Eder Wills is a 24 y.o. female with a history of recent lap cholecystectomy with ERCP induced pancreatitis, recently hospitalized from 11/7 through 11/15 admitted with left-sided flank pain     L flank pain  -Etiology of flank pain seems to be multifactorial.  Patient without symptoms of pneumonia, she does have pleural effusion with some atelectasis.  -Has leukocytosis which could still be leftover from her extensive pancreatitis.  Her procalcitonin is low  -Would like to stop her antibiotics while she is inpatient and monitor her off of antibiotics  -GI does not feel that she needs antibiotics for the pancreatitis at this time either  - blood cultures NGTD  -Pro-Adrian normal, CRP elevated  - zosyn / doxycycline--will plan to d/c today  - duo nebs scheduled  - IS / pulmonary toilet / mobilize patient  -Positive for enterovirus/rhinovirus  -lidocaine patch     ERCP induced pancreatitis (11/9)  S/p lap janell (11/10)  - CT a/p still with extensive inflammatory changes suggestive of acute pancreatitis,  similar to previous  - tolerating PO diet,  - - pain control prn   - nausea control prn  -Appreciate GI input.  No need for abx.     Lower extremity edema  - monitor closely  - daily weights  - strict I/o   -Doppler lower extremities are negative for DVT  -try diuresis tomorrow- gentle      Expected Discharge Location and Transportation: home  Expected Discharge   Expected Discharge Date: 11/20/2024; Expected Discharge Time:      VTE Prophylaxis:  Mechanical VTE prophylaxis orders are present.         AM-PAC 6 Clicks Score (PT): 22 (11/18/24 2043)    CODE STATUS:   Code Status and Medical Interventions: CPR (Attempt to Resuscitate); Full Support   Ordered at: 11/18/24 7641     Code Status (Patient has no pulse and is not breathing):    CPR (Attempt to Resuscitate)     Medical Interventions (Patient has pulse or is breathing):    Full Support       Samira Mcdaniel MD  11/19/24

## 2024-11-20 VITALS
OXYGEN SATURATION: 95 % | DIASTOLIC BLOOD PRESSURE: 87 MMHG | HEIGHT: 62 IN | RESPIRATION RATE: 17 BRPM | SYSTOLIC BLOOD PRESSURE: 126 MMHG | WEIGHT: 209.3 LBS | HEART RATE: 91 BPM | BODY MASS INDEX: 38.52 KG/M2 | TEMPERATURE: 98.2 F

## 2024-11-20 PROBLEM — R10.9 LEFT FLANK PAIN: Status: RESOLVED | Noted: 2024-11-18 | Resolved: 2024-11-20

## 2024-11-20 LAB
ALBUMIN SERPL-MCNC: 3 G/DL (ref 3.5–5.2)
ALBUMIN/GLOB SERPL: 1.1 G/DL
ALP SERPL-CCNC: 175 U/L (ref 39–117)
ALT SERPL W P-5'-P-CCNC: 32 U/L (ref 1–33)
ANION GAP SERPL CALCULATED.3IONS-SCNC: 11 MMOL/L (ref 5–15)
AST SERPL-CCNC: 32 U/L (ref 1–32)
BASOPHILS # BLD AUTO: 0.05 10*3/MM3 (ref 0–0.2)
BASOPHILS NFR BLD AUTO: 0.2 % (ref 0–1.5)
BILIRUB SERPL-MCNC: 0.7 MG/DL (ref 0–1.2)
BUN SERPL-MCNC: 3 MG/DL (ref 6–20)
BUN/CREAT SERPL: 6.7 (ref 7–25)
CALCIUM SPEC-SCNC: 8.7 MG/DL (ref 8.6–10.5)
CHLORIDE SERPL-SCNC: 102 MMOL/L (ref 98–107)
CO2 SERPL-SCNC: 23 MMOL/L (ref 22–29)
CREAT SERPL-MCNC: 0.45 MG/DL (ref 0.57–1)
DEPRECATED RDW RBC AUTO: 48.3 FL (ref 37–54)
DEPRECATED RDW RBC AUTO: 49.2 FL (ref 37–54)
EGFRCR SERPLBLD CKD-EPI 2021: 139.7 ML/MIN/1.73
EOSINOPHIL # BLD AUTO: 0.23 10*3/MM3 (ref 0–0.4)
EOSINOPHIL NFR BLD AUTO: 1.1 % (ref 0.3–6.2)
ERYTHROCYTE [DISTWIDTH] IN BLOOD BY AUTOMATED COUNT: 14.2 % (ref 12.3–15.4)
ERYTHROCYTE [DISTWIDTH] IN BLOOD BY AUTOMATED COUNT: 14.5 % (ref 12.3–15.4)
GLOBULIN UR ELPH-MCNC: 2.7 GM/DL
GLUCOSE SERPL-MCNC: 95 MG/DL (ref 65–99)
HCT VFR BLD AUTO: 29 % (ref 34–46.6)
HCT VFR BLD AUTO: 29.4 % (ref 34–46.6)
HGB BLD-MCNC: 9.4 G/DL (ref 12–15.9)
HGB BLD-MCNC: 9.5 G/DL (ref 12–15.9)
IMM GRANULOCYTES # BLD AUTO: 0.72 10*3/MM3 (ref 0–0.05)
IMM GRANULOCYTES NFR BLD AUTO: 3.4 % (ref 0–0.5)
LYMPHOCYTES # BLD AUTO: 2.02 10*3/MM3 (ref 0.7–3.1)
LYMPHOCYTES NFR BLD AUTO: 9.4 % (ref 19.6–45.3)
MCH RBC QN AUTO: 29.9 PG (ref 26.6–33)
MCH RBC QN AUTO: 30.1 PG (ref 26.6–33)
MCHC RBC AUTO-ENTMCNC: 32 G/DL (ref 31.5–35.7)
MCHC RBC AUTO-ENTMCNC: 32.8 G/DL (ref 31.5–35.7)
MCV RBC AUTO: 91.8 FL (ref 79–97)
MCV RBC AUTO: 93.6 FL (ref 79–97)
MONOCYTES # BLD AUTO: 1.36 10*3/MM3 (ref 0.1–0.9)
MONOCYTES NFR BLD AUTO: 6.3 % (ref 5–12)
NEUTROPHILS NFR BLD AUTO: 17.09 10*3/MM3 (ref 1.7–7)
NEUTROPHILS NFR BLD AUTO: 79.6 % (ref 42.7–76)
NRBC BLD AUTO-RTO: 0.1 /100 WBC (ref 0–0.2)
PLATELET # BLD AUTO: 539 10*3/MM3 (ref 140–450)
PLATELET # BLD AUTO: 553 10*3/MM3 (ref 140–450)
PMV BLD AUTO: 10 FL (ref 6–12)
PMV BLD AUTO: 9.2 FL (ref 6–12)
POTASSIUM SERPL-SCNC: 4.2 MMOL/L (ref 3.5–5.2)
PROT SERPL-MCNC: 5.7 G/DL (ref 6–8.5)
RBC # BLD AUTO: 3.14 10*6/MM3 (ref 3.77–5.28)
RBC # BLD AUTO: 3.16 10*6/MM3 (ref 3.77–5.28)
SODIUM SERPL-SCNC: 136 MMOL/L (ref 136–145)
WBC NRBC COR # BLD AUTO: 21.09 10*3/MM3 (ref 3.4–10.8)
WBC NRBC COR # BLD AUTO: 21.47 10*3/MM3 (ref 3.4–10.8)

## 2024-11-20 PROCEDURE — 85027 COMPLETE CBC AUTOMATED: CPT | Performed by: INTERNAL MEDICINE

## 2024-11-20 PROCEDURE — 85025 COMPLETE CBC W/AUTO DIFF WBC: CPT | Performed by: PEDIATRICS

## 2024-11-20 PROCEDURE — 94799 UNLISTED PULMONARY SVC/PX: CPT

## 2024-11-20 PROCEDURE — 94761 N-INVAS EAR/PLS OXIMETRY MLT: CPT

## 2024-11-20 PROCEDURE — 99231 SBSQ HOSP IP/OBS SF/LOW 25: CPT | Performed by: PHYSICIAN ASSISTANT

## 2024-11-20 PROCEDURE — 97161 PT EVAL LOW COMPLEX 20 MIN: CPT

## 2024-11-20 PROCEDURE — 99239 HOSP IP/OBS DSCHRG MGMT >30: CPT | Performed by: PEDIATRICS

## 2024-11-20 PROCEDURE — 80053 COMPREHEN METABOLIC PANEL: CPT | Performed by: INTERNAL MEDICINE

## 2024-11-20 PROCEDURE — 25010000002 FUROSEMIDE PER 20 MG: Performed by: PEDIATRICS

## 2024-11-20 PROCEDURE — 94664 DEMO&/EVAL PT USE INHALER: CPT

## 2024-11-20 RX ORDER — SIMETHICONE 80 MG
80 TABLET,CHEWABLE ORAL 4 TIMES DAILY PRN
Status: DISCONTINUED | OUTPATIENT
Start: 2024-11-20 | End: 2024-11-20 | Stop reason: HOSPADM

## 2024-11-20 RX ORDER — FUROSEMIDE 20 MG/1
20 TABLET ORAL ONCE
Status: COMPLETED | OUTPATIENT
Start: 2024-11-20 | End: 2024-11-20

## 2024-11-20 RX ORDER — IPRATROPIUM BROMIDE AND ALBUTEROL SULFATE 2.5; .5 MG/3ML; MG/3ML
3 SOLUTION RESPIRATORY (INHALATION) EVERY 6 HOURS PRN
Status: DISCONTINUED | OUTPATIENT
Start: 2024-11-20 | End: 2024-11-20 | Stop reason: HOSPADM

## 2024-11-20 RX ORDER — HYDROCODONE BITARTRATE AND ACETAMINOPHEN 5; 325 MG/1; MG/1
1 TABLET ORAL EVERY 6 HOURS PRN
Qty: 12 TABLET | Refills: 0 | Status: SHIPPED | OUTPATIENT
Start: 2024-11-20 | End: 2024-11-23

## 2024-11-20 RX ORDER — LIDOCAINE 4 G/G
1 PATCH TOPICAL
Qty: 7 EACH | Refills: 1 | Status: SHIPPED | OUTPATIENT
Start: 2024-11-21 | End: 2024-11-20

## 2024-11-20 RX ORDER — HYDROCODONE BITARTRATE AND ACETAMINOPHEN 5; 325 MG/1; MG/1
1 TABLET ORAL EVERY 6 HOURS PRN
Qty: 12 TABLET | Refills: 0 | Status: SHIPPED | OUTPATIENT
Start: 2024-11-20 | End: 2024-11-20

## 2024-11-20 RX ORDER — LIDOCAINE 4 G/G
1 PATCH TOPICAL
Qty: 7 EACH | Refills: 1 | Status: SHIPPED | OUTPATIENT
Start: 2024-11-21

## 2024-11-20 RX ORDER — FUROSEMIDE 10 MG/ML
20 INJECTION INTRAMUSCULAR; INTRAVENOUS ONCE
Status: DISCONTINUED | OUTPATIENT
Start: 2024-11-20 | End: 2024-11-20 | Stop reason: HOSPADM

## 2024-11-20 RX ADMIN — SIMETHICONE 80 MG: 80 TABLET, CHEWABLE ORAL at 00:48

## 2024-11-20 RX ADMIN — HYDROCODONE BITARTRATE AND ACETAMINOPHEN 1 TABLET: 5; 325 TABLET ORAL at 00:48

## 2024-11-20 RX ADMIN — Medication 10 ML: at 08:32

## 2024-11-20 RX ADMIN — FUROSEMIDE 20 MG: 20 TABLET ORAL at 12:15

## 2024-11-20 RX ADMIN — LIDOCAINE 1 PATCH: 4 PATCH TOPICAL at 08:31

## 2024-11-20 RX ADMIN — FLUOXETINE HYDROCHLORIDE 40 MG: 20 CAPSULE ORAL at 08:31

## 2024-11-20 RX ADMIN — BACITRACIN 0.9 G: 500 OINTMENT TOPICAL at 08:32

## 2024-11-20 RX ADMIN — FAMOTIDINE 40 MG: 20 TABLET, FILM COATED ORAL at 08:31

## 2024-11-20 RX ADMIN — IPRATROPIUM BROMIDE AND ALBUTEROL SULFATE 3 ML: 2.5; .5 SOLUTION RESPIRATORY (INHALATION) at 07:20

## 2024-11-20 NOTE — PROGRESS NOTES
"GI Daily Progress Note  Subjective:    Chief Complaint:  Follow up post ERCP pancreatitis     Feels better.   Tolerating regular diet.   Denies abdominal pain.    Left flank pain is improved.       Objective:    /87 (BP Location: Right arm, Patient Position: Sitting)   Pulse 91   Temp 98.2 °F (36.8 °C) (Oral)   Resp 17   Ht 157.5 cm (62\")   Wt 94.9 kg (209 lb 4.8 oz)   LMP 10/29/2024 (Approximate)   SpO2 95%   BMI 38.28 kg/m²     Physical Exam  Constitutional:       General: She is not in acute distress.     Appearance: She is not toxic-appearing.   Abdominal:      General: There is no distension.      Tenderness: There is no abdominal tenderness.   Neurological:      Mental Status: She is alert and oriented to person, place, and time.   Psychiatric:         Behavior: Behavior normal.         Thought Content: Thought content normal.         Lab  Lab Results   Component Value Date    WBC 21.09 (H) 11/20/2024    WBC 21.47 (H) 11/20/2024    HGB 9.5 (L) 11/20/2024    HGB 9.4 (L) 11/20/2024    HGB 9.6 (L) 11/19/2024    MCV 91.8 11/20/2024    MCV 93.6 11/20/2024     (H) 11/20/2024     (H) 11/20/2024    INR 1.02 11/10/2024     Lab Results   Component Value Date    GLUCOSE 95 11/20/2024    BUN 3 (L) 11/20/2024    CREATININE 0.45 (L) 11/20/2024    BCR 6.7 (L) 11/20/2024     11/20/2024    K 4.2 11/20/2024    CO2 23.0 11/20/2024    CALCIUM 8.7 11/20/2024    ALBUMIN 3.0 (L) 11/20/2024    ALKPHOS 175 (H) 11/20/2024    BILITOT 0.7 11/20/2024    ALT 32 11/20/2024    AST 32 11/20/2024     Assessment:    Left flank pain, suspect secondary to pancreatitis    Post ERCP pancreatitis, severe with SIRS   Rhinovirus   Bilateral lower lobe atelectasis versus pneumonia   S/p ERCP with sphincterotomy and balloon sweep on 11/9/24  S/p cholecystectomy on 11/10/24, POD # 10    Plan:    >> Follow up with [facility] outpatient and repeat cross sectional imaging in 4-6 weeks   >> Short course of opioids at " discharge  >> Incentive spirometry     ROXY Taylor  11/20/24  13:09 EST

## 2024-11-20 NOTE — PROGRESS NOTES
Case Management Discharge Note      Final Note: Eder Wills will be discharged home and there are no discharge needs identified.         Selected Continued Care - Admitted Since 11/18/2024       Destination    No services have been selected for the patient.                Durable Medical Equipment    No services have been selected for the patient.                Dialysis/Infusion    No services have been selected for the patient.                Home Medical Care    No services have been selected for the patient.                Therapy    No services have been selected for the patient.                Community Resources    No services have been selected for the patient.                Community & DME    No services have been selected for the patient.                    Transportation Services  Private: Car    Final Discharge Disposition Code: 01 - home or self-care

## 2024-11-20 NOTE — PLAN OF CARE
Goal Outcome Evaluation:  Plan of Care Reviewed With: (P) patient, parent        Progress: (P) no change  Outcome Evaluation: (P) Pt presents close to baseline w/ functional mobility. Pt ambulated 225 ft and navigated 10 stairs w/ SBA. No LOB noted during mobility. Pt required 1 seated rest break before stair training d/t fatigue. Pt does not require skilled IPPT services to return to PLOF. Rec d/c home w/ assist once medically stable.    Anticipated Discharge Disposition (PT): (P) home with assist

## 2024-11-20 NOTE — DISCHARGE SUMMARY
[facility] Medicine Services  DISCHARGE SUMMARY    Patient Name: Eder Wills  : 2000  MRN: [id number]    Date of Admission: 2024  1:26 AM  Date of Discharge:  2024  Primary Care Physician: Eva Tovar MD    Consults       Date and Time Order Name Status Description    2024 10:04 PM Inpatient Gastroenterology Consult Completed     2024  7:23 AM Inpatient General Surgery Consult Completed     2024  2:51 AM Inpatient Gastroenterology Consult Completed             Hospital Course     Presenting Problem: L flank pain    Active Hospital Problems    Diagnosis  POA    Post-ERCP acute pancreatitis, ERCP 2024 [K91.89, K85.90]  Yes    Status post cholecystectomy, 11/10/2024 [Z90.49]  Not Applicable    Lower extremity edema [R60.0]  Yes    Mood disorder [F39]  Yes      Resolved Hospital Problems    Diagnosis Date Resolved POA    Left flank pain [R10.9] 2024 Yes          Hospital Course:  Eder Wills is a 24 y.o. female with a history of recent lap cholecystectomy with ERCP induced pancreatitis, recently hospitalized from  through 11/15 admitted with left-sided flank pain      L flank pain  Leukocytosis  -Etiology of flank pain seems to be multifactorial.  Patient without symptoms of pneumonia, she does have pleural effusion with some atelectasis.  -Has leukocytosis which could still be leftover from her extensive pancreatitis.  Her procalcitonin is low, also with current viral infection  -Stopped abx.  -GI does not feel that she needs antibiotics for the pancreatitis at this time either  - blood cultures NGTD  - IS / pulmonary toilet / mobilize patient  -Positive for enterovirus/rhinovirus  -lidocaine patch     ERCP induced pancreatitis ()  S/p lap janell (11/10)  - CT a/p still with extensive inflammatory changes suggestive of acute pancreatitis, similar to previous  -tolerating PO diet  -pain control prn with norco and  lidocaine patch on d/c.  -Plan for repeat imaging in 4-6 weeks.  -GI to call to set up follow up appointment     Lower extremity edema  -Doppler lower extremities are negative for DVT  -s/p dose po lasix      Discharge Follow Up Recommendations for outpatient labs/diagnostics:   Repeat CT A/P in 4-6 weeks    Day of Discharge     HPI:   Pt doing well.  Pain controlled.  Mom feels that she is looking better. Walking around the room.    Vital Signs:   Temp:  [98.1 °F (36.7 °C)-98.9 °F (37.2 °C)] 98.2 °F (36.8 °C)  Heart Rate:  [] 91  Resp:  [16-21] 17  BP: (111-144)/(67-97) 126/87      Physical Exam:  Constitutional: No acute distress, awake, alert  HENT: NCAT, mucous membranes moist  Respiratory: Clear to auscultation bilaterally, respiratory effort normal, some diminished BS at bases bilaterally.    Cardiovascular: RRR, no murmurs, rubs, or gallops  Gastrointestinal: Positive bowel sounds, soft, nontender, nondistended  Musculoskeletal: 1-2+ bilateral ankle edema, slightly more on the right than the left  Psychiatric: Appropriate affect, cooperative  Neurologic: Oriented x 3, strength symmetric in all extremities, Cranial Nerves grossly intact to confrontation, speech clear  Skin: No rashes    Pertinent  and/or Most Recent Results     LAB RESULTS:      Lab 11/20/24  0720 11/19/24  0850 11/18/24  0527 11/18/24  0120 11/15/24  0859 11/14/24  0357   WBC 21.47*  21.09* 23.46* 24.41* 28.63* 18.55* 15.81*   HEMOGLOBIN 9.4*  9.5* 9.6* 9.4* 9.9* 10.5* 9.6*   HEMATOCRIT 29.4*  29.0* 30.7* 29.7* 30.3* 32.6* 29.1*   PLATELETS 553*  539* 536* 449 486* 392 319   NEUTROS ABS 17.09* 18.75* 22.46* 22.25* 13.99* 12.75*   IMMATURE GRANS (ABS) 0.72* 1.06*  --  1.68* 0.75* 0.14*   LYMPHS ABS 2.02 2.00  --  2.50 1.97 1.49   MONOS ABS 1.36* 1.45*  --  1.97* 1.63* 1.33*   EOS ABS 0.23 0.13 0.00 0.15 0.16 0.07   MCV 93.6  91.8 93.9 93.1 92.4 93.4 92.1   CRP  --   --   --  30.11*  --   --    PROCALCITONIN  --   --   --  0.20  --    --    LACTATE  --   --   --  0.9  --   --          Lab 11/20/24  0720 11/18/24  0526 11/18/24  0120 11/15/24  0859 11/14/24  1358 11/14/24  0855 11/14/24  0357 11/14/24  0357 11/13/24  1647   SODIUM 136 137 136 138  --   --   --  138  --    POTASSIUM 4.2 4.1 4.1 4.1 4.8  --    < > 3.5  --    CHLORIDE 102 103 101 105  --   --   --  104  --    CO2 23.0 25.0 24.0 22.0  --   --   --  23.0  --    ANION GAP 11.0 9.0 11.0 11.0  --   --   --  11.0  --    BUN 3* 5* 5* 7  --   --   --  7  --    CREATININE 0.45* 0.47* 0.45* 0.35*  --   --   --  0.41*  --    EGFR 139.7 138.2 139.7 148.4  --   --   --  143.8  --    GLUCOSE 95 100* 107* 97  --   --   --  102*  --    CALCIUM 8.7 8.2* 8.2* 7.9*  --   --   --  7.3*  --    MAGNESIUM  --  1.8 1.8 1.9  --  2.4  --  1.9  --    PHOSPHORUS  --  4.2  --  2.5 1.7*  --   --  1.9* 1.7*    < > = values in this interval not displayed.         Lab 11/20/24  0720 11/18/24  0526 11/18/24 0120 11/14/24 0357   TOTAL PROTEIN 5.7* 4.8* 5.8* 4.8*   ALBUMIN 3.0* 2.7* 2.8* 2.7*   GLOBULIN 2.7 2.1 3.0 2.1   ALT (SGPT) 32 29 31 54*   AST (SGOT) 32 18 32 29   BILIRUBIN 0.7 0.7 0.7 1.1   ALK PHOS 175* 157* 195* 135*   LIPASE  --   --  76* 39             Lab 11/18/24 0120   TRIGLYCERIDES 151*             Brief Urine Lab Results  (Last result in the past 365 days)        Color   Clarity   Blood   Leuk Est   Nitrite   Protein   CREAT   Urine HCG        11/18/24 0137               Negative             Microbiology Results (last 10 days)       Procedure Component Value - Date/Time    MRSA Screen, PCR (Inpatient) - Swab, Nares [[id number]]  (Normal) Collected: 11/18/24 0515    Lab Status: Final result Specimen: Swab from Nares Updated: 11/18/24 0729     MRSA PCR Negative    Narrative:      The negative predictive value of this diagnostic test is high and should only be used to consider de-escalating anti-MRSA therapy. A positive result may indicate colonization with MRSA and must be correlated clinically.  MRSA  Negative    Respiratory Panel PCR w/COVID-19(SARS-CoV-2) BILLY/VANIA/MUNA/PAD/[facility] In-House, NP Swab in UTM/VTM, 2 HR TAT - Swab, Nasopharynx [[id number]]  (Abnormal) Collected: 11/18/24 0515    Lab Status: Final result Specimen: Swab from Nasopharynx Updated: 11/18/24 0621     ADENOVIRUS, PCR Not Detected     Coronavirus 229E Not Detected     Coronavirus HKU1 Not Detected     Coronavirus NL63 Not Detected     Coronavirus OC43 Not Detected     COVID19 Not Detected     Human Metapneumovirus Not Detected     Human Rhinovirus/Enterovirus Detected     Influenza A PCR Not Detected     Influenza B PCR Not Detected     Parainfluenza Virus 1 Not Detected     Parainfluenza Virus 2 Not Detected     Parainfluenza Virus 3 Not Detected     Parainfluenza Virus 4 Not Detected     RSV, PCR Not Detected     Bordetella pertussis pcr Not Detected     Bordetella parapertussis PCR Not Detected     Chlamydophila pneumoniae PCR Not Detected     Mycoplasma pneumo by PCR Not Detected    Narrative:      In the setting of a positive respiratory panel with a viral infection PLUS a negative procalcitonin without other underlying concern for bacterial infection, consider observing off antibiotics or discontinuation of antibiotics and continue supportive care. If the respiratory panel is positive for atypical bacterial infection (Bordetella pertussis, Chlamydophila pneumoniae, or Mycoplasma pneumoniae), consider antibiotic de-escalation to target atypical bacterial infection.    Blood Culture - Blood, Arm, Left [[id number]]  (Normal) Collected: 11/18/24 0348    Lab Status: Preliminary result Specimen: Blood from Arm, Left Updated: 11/20/24 0515     Blood Culture No growth at 2 days    Blood Culture - Blood, Arm, Right [[id number]]  (Normal) Collected: 11/18/24 0348    Lab Status: Preliminary result Specimen: Blood from Arm, Right Updated: 11/20/24 0515     Blood Culture No growth at 2 days    Legionella Antigen, Urine - Urine, Urine, Clean Catch  [[id number]]  (Normal) Collected: 11/18/24 0130    Lab Status: Final result Specimen: Urine, Clean Catch Updated: 11/18/24 1038     LEGIONELLA ANTIGEN, URINE Negative    S. Pneumo Ag Urine or CSF - Urine, Urine, Clean Catch [[id number]]  (Normal) Collected: 11/18/24 0130    Lab Status: Final result Specimen: Urine, Clean Catch Updated: 11/18/24 1038     Strep Pneumo Ag Negative    Blood Culture - Blood, Hand, Left [[id number]]  (Normal) Collected: 11/11/24 1632    Lab Status: Final result Specimen: Blood from Hand, Left Updated: 11/16/24 1700     Blood Culture No growth at 5 days    Blood Culture - Blood, Hand, Left [[id number]]  (Normal) Collected: 11/11/24 1240    Lab Status: Final result Specimen: Blood from Hand, Left Updated: 11/16/24 1316     Blood Culture No growth at 5 days    Narrative:      Less than seven (7) mL's of blood was collected.  Insufficient quantity may yield false negative results.            US Chest    Result Date: 11/19/2024  US CHEST Date of Exam: 11/18/2024 6:30 PM EST Indication: left pleural effusion. Comparison: 11/11/2024 CTA chest. 11/18/2024 chest radiograph Technique: Ultrasound of the chest was performed.  Real time scanning was performed with image documentation obtained per protocol. Findings: Previous CTA chest showed large bilateral pleural effusions. Effusions are difficult to identify on 11/19/2024's chest radiograph in part due to low lung volumes but there was opacification of the left lung base. Current ultrasound shows a small to moderate left pleural effusion, and atelectasis of the lower lung.     Impression: Small to moderate remaining left pleural effusion, and associated atelectatic lower lung. Electronically Signed: Gio Domínguez MD  11/19/2024 9:15 AM EST  Workstation ID: [id number]    Duplex Venous Lower Extremity - Bilateral CAR    Result Date: 11/18/2024    Normal bilateral lower extremity venous duplex scan.     XR Chest 1 View    Result Date: 11/18/2024  XR CHEST 1 VW  Date of Exam: 11/18/2024 4:11 AM EST Indication: leukocytosis / swelling Comparison: 11/11/2024. Findings: There are no airspace consolidations. Probable small effusions present, left greater than right.. No pneumothorax. The pulmonary vasculature appears indistinct. The heart appears enlarged, stable.. No acute osseous abnormality identified.     Impression: Mild pulmonary edema pattern with probable small effusions, similar as compared to the previous study. Electronically Signed: Irma Schaffer MD  11/18/2024 4:36 AM EST  Workstation ID: [id number]    CT Abdomen Pelvis With Contrast    Result Date: 11/18/2024  CT ABDOMEN PELVIS W CONTRAST Date of Exam: 11/18/2024 2:34 AM EST Indication: Upper abdominal pain, recent biliary obstruction and pancreatitis. Comparison: 11/11/2024. Technique: Axial CT images were obtained of the abdomen and pelvis following the uneventful intravenous administration of intravenous contrast. Reconstructed coronal and sagittal images were also obtained. Automated exposure control and iterative construction methods were used. Findings: Lung Bases:   Trace right-sided pleural effusion and small left-sided pleural effusion present, improved as compared to the previous study. Airspace disease present within the bilateral lower lobes with consolidative changes and air bronchograms, mildly progressed as compared to the previous study. Liver: Liver is normal in size and CT density. No focal lesions. Biliary/Gallbladder:  The gallbladder has been resected.. The biliary tree is nondilated. Spleen: Spleen is normal in size and CT density. Pancreas:  The pancreas appears edematous. Extensive inflammatory changes are seen surrounding the pancreas and extending along the perirenal area, the stomach the portal region and tracking along the retroperitoneum, similar as compared to the previous study. No definite focal collection identified. No evidence of pockets of air. No drainable collection. Small amount  of free fluid is present extending within the pelvis, similar as compared to the previous study. No evidence of pancreatic ductal dilatation. Kidneys:  Kidneys are normal in size. There are no stones or hydronephrosis. Adrenals:  Adrenal glands are unremarkable. Retroperitoneal/Lymph Nodes/Vasculature:  No retroperitoneal adenopathy is identified. Gastrointestinal/Mesentery:  The bowel loops are non-dilated without wall thickening or mass. The appendix appears within normal limits. No evidence of obstruction. No free air. No mesenteric fluid collections identified. No significant stool burden. No evidence of hernia. There is anasarca of the soft tissues. Bladder:  The bladder is normal. Genital:   Unremarkable       Bony Structures:   Visualized bony structures are consistent with the patient's age.     Impression: 1.No significant change. Findings consistent with acute pancreatitis with extensive inflammatory changes surrounding the pancreas and extending along the retroperitoneum, similar as compared to the previous study. No drainable collection identified. No evidence of pancreatic ductal dilatation. 2.Bilateral lower lobe atelectasis versus pneumonia, mildly progressed as compared to the previous study. Small bilateral pleural effusions, improved as compared to the previous study. 3.Ancillary findings as described above. Electronically Signed: Irma Schaffer MD  11/18/2024 3:02 AM EST  Workstation ID: [id number]    CT Angiogram Chest    Result Date: 11/11/2024  CT ANGIOGRAM CHEST, CT ABD W CONTRAST PELVIS W WO CONTRAST Date of Exam: 11/11/2024 12:46 PM EST Indication: pleuritic chest pasin, tachycardia. Comparison: CT abdomen and pelvis of 11/8/2024. Technique: CTA of the chest was performed before and after the uneventful intravenous administration of 85 mL Isovue-370. Reconstructed coronal and sagittal images were also obtained. In addition, a 3-D volume rendered image was created for interpretation. Automated  exposure control and iterative reconstruction methods were used. Findings: CT chest: There are no filling defects suspicious for pulmonary emboli. There are no enlarged mediastinal nodes. There are no hilar masses. There are small bilateral pleural effusions. There is compressive atelectasis in the lung bases. The heart size is  normal. There is no pericardial effusion. CT abdomen and pelvis: The gallbladder has been removed. An oval nonenhancing fluid collection is identified in the gallbladder fossa measuring 6 x 2 cm. There is a surgical drain entering the right abdomen having its tip near the gallbladder fossa. There is new moderately large amount of abdominal ascites and mild amount of pelvic ascites. There is peripancreatic soft tissue stranding and fluid. There is no evidence of a focal enhancing or encapsulated fluid collection. There is no bile duct dilatation. There are no focal liver lesions. Spleen and adrenal glands appear normal. There are no renal lesions or hydronephrosis. Partially filled bladder appears grossly normal. There is no pelvic mass. There is no large or small bowel dilatation. There is a small amount of free intraperitoneal air identified over the left hepatic lobe. The stomach is collapsed.     Negative exam for pulmonary embolism. Small bilateral pleural effusions with mild compressive bibasilar atelectasis. Findings compatible with acute pancreatitis. Moderate amount of abdominal and small amount of pelvic ascites. Small nonencapsulated fluid collection in the gallbladder fossa, status post cholecystectomy. Small amount of free air is compatible with recent  surgery. Surgical drain is in place. Electronically Signed: Kristi Matta MD  11/11/2024 1:40 PM EST  Workstation ID: [id number]    CT Abd With Contrast Pelvis With & Without Contrast    Result Date: 11/11/2024  CT ANGIOGRAM CHEST, CT ABD W CONTRAST PELVIS W WO CONTRAST Date of Exam: 11/11/2024 12:46 PM EST Indication: pleuritic  chest pasin, tachycardia. Comparison: CT abdomen and pelvis of 11/8/2024. Technique: CTA of the chest was performed before and after the uneventful intravenous administration of 85 mL Isovue-370. Reconstructed coronal and sagittal images were also obtained. In addition, a 3-D volume rendered image was created for interpretation. Automated exposure control and iterative reconstruction methods were used. Findings: CT chest: There are no filling defects suspicious for pulmonary emboli. There are no enlarged mediastinal nodes. There are no hilar masses. There are small bilateral pleural effusions. There is compressive atelectasis in the lung bases. The heart size is  normal. There is no pericardial effusion. CT abdomen and pelvis: The gallbladder has been removed. An oval nonenhancing fluid collection is identified in the gallbladder fossa measuring 6 x 2 cm. There is a surgical drain entering the right abdomen having its tip near the gallbladder fossa. There is new moderately large amount of abdominal ascites and mild amount of pelvic ascites. There is peripancreatic soft tissue stranding and fluid. There is no evidence of a focal enhancing or encapsulated fluid collection. There is no bile duct dilatation. There are no focal liver lesions. Spleen and adrenal glands appear normal. There are no renal lesions or hydronephrosis. Partially filled bladder appears grossly normal. There is no pelvic mass. There is no large or small bowel dilatation. There is a small amount of free intraperitoneal air identified over the left hepatic lobe. The stomach is collapsed.     Negative exam for pulmonary embolism. Small bilateral pleural effusions with mild compressive bibasilar atelectasis. Findings compatible with acute pancreatitis. Moderate amount of abdominal and small amount of pelvic ascites. Small nonencapsulated fluid collection in the gallbladder fossa, status post cholecystectomy. Small amount of free air is compatible with  recent  surgery. Surgical drain is in place. Electronically Signed: Kristi Matta MD  11/11/2024 1:40 PM EST  Workstation ID: [id number]     Results for orders placed during the hospital encounter of 11/18/24    Duplex Venous Lower Extremity - Bilateral CAR    Interpretation Summary    Normal bilateral lower extremity venous duplex scan.      Results for orders placed during the hospital encounter of 11/18/24    Duplex Venous Lower Extremity - Bilateral CAR    Interpretation Summary    Normal bilateral lower extremity venous duplex scan.          Plan for Follow-up of Pending Labs/Results: We will call with abnormal results.    Pending Labs       Order Current Status    Blood Culture - Blood, Arm, Left Preliminary result    Blood Culture - Blood, Arm, Right Preliminary result          Discharge Details        Discharge Medications        ASK your doctor about these medications        Instructions Start Date   amphetamine-dextroamphetamine XR 30 MG 24 hr capsule  Commonly known as: ADDERALL XR   1 capsule, Daily      FLUoxetine 40 MG capsule  Commonly known as: PROzac   1 capsule, Daily      metoprolol tartrate 25 MG tablet  Commonly known as: LOPRESSOR   25 mg, Oral, 2 Times Daily               No Known Allergies      Discharge Disposition:      Diet:  Hospital:  Diet Order   Procedures    Diet: Gastrointestinal; Fiber-Restricted, Low Irritant; Texture: Regular (IDDSI 7); Fluid Consistency: Thin (IDDSI 0)            Activity:      Restrictions or Other Recommendations:  As tolerated       CODE STATUS:    Code Status and Medical Interventions: CPR (Attempt to Resuscitate); Full Support   Ordered at: 11/18/24 0411     Code Status (Patient has no pulse and is not breathing):    CPR (Attempt to Resuscitate)     Medical Interventions (Patient has pulse or is breathing):    Full Support       No future appointments.              Samira Mcdaniel MD  11/20/24      Time Spent on Discharge:  I spent  34  minutes on  this discharge activity which included: face-to-face encounter with the patient, reviewing the data in the system, coordination of the care with the nursing staff as well as consultants, documentation, and entering orders.

## 2024-11-20 NOTE — THERAPY DISCHARGE NOTE
Patient Name: Eder Wills  : 2000    MRN: [id number]                              Today's Date: 2024       Admit Date: 2024    Visit Dx:     ICD-10-CM ICD-9-CM   1. Other acute pancreatitis, unspecified complication status  K85.80 577.0   2. Leukocytosis, unspecified type  D72.829 288.60     Patient Active Problem List   Diagnosis    Mood disorder    Obesity (BMI 30-39.9)    Post-ERCP acute pancreatitis, ERCP 2024    Left flank pain    Pneumonia of both lower lobes    Status post cholecystectomy, 11/10/2024    Lower extremity edema     Past Medical History:   Diagnosis Date    Obesity (BMI 30-39.9) 2024     Past Surgical History:   Procedure Laterality Date    ADENOIDECTOMY      CHOLECYSTECTOMY N/A 11/10/2024    Procedure: CHOLECYSTECTOMY LAPAROSCOPIC;  Surgeon: Raheem Raza MD;  Location:  [facility];  Service: General;  Laterality: N/A;    COSMETIC SURGERY          ERCP N/A 2024    Procedure: ENDOSCOPIC RETROGRADE CHOLANGIOPANCREATOGRAPHY;  Surgeon: Dick Patrick MD;  Location: [facility];  Service: Gastroenterology;  Laterality: N/A;    TONSILLECTOMY        General Information       Row Name 24 1031          Physical Therapy Time and Intention    Document Type discharge evaluation/summary (P)   -TM     Mode of Treatment physical therapy (P)   -TM       Row Name 24 1031          General Information    Patient Profile Reviewed yes (P)   -TM     Prior Level of Function independent:;all household mobility;community mobility;gait;ADL's (P)   I w/o use of an AD  -TM     Existing Precautions/Restrictions fall;other (see comments) (P)   abd incision (11/10/24)  -TM     Barriers to Rehab none identified (P)   -TM       Row Name 24 1031          Living Environment    People in Home parent(s) (P)   lives w/ friends but will be d/c to parents home  -TM       Row Name 24 1031          Home Main Entrance    Number of Stairs, Main Entrance  four (P)   -TM     Stair Railings, Main Entrance railing on left side (ascending) (P)   -TM       Row Name 11/20/24 1031          Stairs Within Home, Primary    Number of Stairs, Within Home, Primary twelve (P)   -TM     Stair Railings, Within Home, Primary railing on right side (ascending) (P)   -TM       Row Name 11/20/24 1031          Cognition    Orientation Status (Cognition) oriented x 4 (P)   -TM       Row Name 11/20/24 1031          Safety Issues/Impairments Affecting Functional Mobility    Impairments Affecting Function (Mobility) endurance/activity tolerance;pain (P)   -TM               User Key  (r) = Recorded By, (t) = Taken By, (c) = Cosigned By      Initials Name Provider Type     Todd Wright, PT Student PT Student                   Mobility       Row Name 11/20/24 1034          Bed Mobility    Comment, (Bed Mobility) Pt UIC upon entering room. (P)   -       Row Name 11/20/24 1034          Sit-Stand Transfer    Sit-Stand [place] (Transfers) independent (P)   -TM     Comment, (Sit-Stand Transfer) No VCs for sequencing or hand placement. (P)   -TM       Row Name 11/20/24 1034          Gait/Stairs (Locomotion)    [place] Level (Gait) standby assist (P)   -TM     Distance in Feet (Gait) 225 (P)   -TM     Deviations/Abnormal Patterns (Gait) brandon decreased;gait speed decreased;stride length decreased (P)   -TM     [place] Level (Stairs) stand by assist (P)   -TM     Handrail Location (Stairs) left side (descending);right side (ascending) (P)   -TM     Number of Steps (Stairs) 10 (P)   -TM     Ascending Technique (Stairs) step-over-step (P)   -TM     Descending Technique (Stairs) step-over-step (P)   -TM     Comment, (Gait/Stairs) Pt demo'd step through gait speed w/ dec gait speed. Pt w/ no LOB noted during mobility. Required 1 seated rest break following ambulation before stair training d/t fatigue. (P)   -TM               User Key  (r) = Recorded By, (t) = Taken By, (c) =  Cosigned By      Initials Name Provider Type     Todd Wright, PT Student PT Student                   Obj/Interventions       [facility] Name 11/20/24 1036          Range of Motion Comprehensive    General Range of Motion bilateral lower extremity ROM WFL (P)   -TM       Row Name 11/20/24 1036          Strength Comprehensive (MMT)    General Manual Muscle Testing (MMT) Assessment no strength deficits identified (P)   -TM     Comment, General Manual Muscle Testing (MMT) Assessment BLE 4+/5 (P)   -TM       Row Name 11/20/24 1036          Balance    Balance Assessment sitting static balance;sitting dynamic balance;standing static balance;standing dynamic balance (P)   -TM     Static Sitting Balance independent (P)   -TM     Dynamic Sitting Balance independent (P)   -TM     Position, Sitting Balance unsupported;sitting edge of bed (P)   -TM     Static Standing Balance independent (P)   -TM     Dynamic Standing Balance standby assist (P)   -TM     Position/Device Used, Standing Balance unsupported (P)   -TM               User Key  (r) = Recorded By, (t) = Taken By, (c) = Cosigned By      Initials Name Provider Type     Todd Wright, PT Student PT Student                   Goals/Plan    No documentation.                  Clinical Impression       [facility] Name 11/20/24 1037          Pain    Pain Location abdomen (P)   -TM     Pain Side/Orientation other (see comments) (P)   incisional  -TM     Pain Management Interventions exercise or physical activity utilized (P)   -TM     Response to Pain Interventions no change per patient report;activity participation with tolerable pain (P)   -TM     Additional Documentation Pain Scale: FACES Pre/Post-Treatment (Group) (P)   -TM       Row Name 11/20/24 1037          Pain Scale: FACES Pre/Post-Treatment    Pain: FACES Scale, Pretreatment 2-->hurts little bit (P)   -TM     Posttreatment Pain Rating 2-->hurts little bit (P)   -TM       Row Name 11/20/24 1037          Plan of Care  Review    Plan of Care Reviewed With patient;parent (P)   -TM     Progress no change (P)   -TM     Outcome Evaluation Pt presents close to baseline w/ functional mobility. Pt ambulated 225 ft and navigated 10 stairs w/ SBA. No LOB noted during mobility. Pt required 1 seated rest break before stair training d/t fatigue. Pt does not require skilled IPPT services to return to PLOF. Rec d/c home w/ assist once medically stable. (P)   -TM       Row Name 11/20/24 1037          Therapy Assessment/Plan (PT)    Patient/Family Therapy Goals Statement (PT) go home (P)   -TM     Criteria for Skilled Interventions Met (PT) no;no problems identified which require skilled intervention (P)   -TM     Therapy Frequency (PT) evaluation only (P)   -TM     Predicted Duration of Therapy Intervention (PT) discharge evaluation (P)   -TM       Row Name 11/20/24 1037          Vital Signs    Pre Systolic BP Rehab 144 (P)   -TM     Pre Treatment Diastolic BP 77 (P)   -TM     Pretreatment Heart Rate (beats/min) 103 (P)   -TM     Posttreatment Heart Rate (beats/min) 102 (P)   -TM     Pre SpO2 (%) 95 (P)   -TM     O2 Delivery Pre Treatment room air (P)   -TM     Post SpO2 (%) 94 (P)   -TM     O2 Delivery Post Treatment room air (P)   -TM     Pre Patient Position Sitting (P)   -TM     Post Patient Position Sitting (P)   -TM       Row Name 11/20/24 1037          Positioning and Restraints    Pre-Treatment Position sitting in chair/recliner (P)   -TM     Post Treatment Position bed (P)   -TM     In Bed notified nsg;sitting EOB;call light within reach;with family/caregiver (P)   -TM               User Key  (r) = Recorded By, (t) = Taken By, (c) = Cosigned By      Initials Name Provider Type    TM Todd Wright, PT Student PT Student                   Outcome Measures       Row Name 11/20/24 1041 11/20/24 0832       How much help from another person do you currently need...    Turning from your back to your side while in flat bed without using  bedrails? 4 (P)   -TM 4  -BA    Moving from lying on back to sitting on the side of a flat bed without bedrails? 4 (P)   -TM 4  -BA    Moving to and from a bed to a chair (including a wheelchair)? 4 (P)   -TM 4  -BA    Standing up from a chair using your arms (e.g., wheelchair, bedside chair)? 4 (P)   -TM 4  -BA    Climbing 3-5 steps with a railing? 4 (P)   -TM 3  -BA    To walk in hospital room? 4 (P)   -TM 4  -BA    AM-PAC 6 Clicks Score (PT) 24 (P)   -TM 23  -BA    Highest Level of Mobility Goal 8 --> Walked 250 feet or more (P)   - 7 --> Walk 25 feet or more  -      Row Name 11/20/24 1041          Functional Assessment    Outcome Measure Options AM-PAC 6 Clicks Basic Mobility (PT) (P)   -               User Key  (r) = Recorded By, (t) = Taken By, (c) = Cosigned By      Initials Name Provider Type     Leydi Duffy, RN Registered Nurse     Todd Wright, PT Student PT Student                  Physical Therapy Education       Title: PT OT SLP Therapies (In Progress)       Topic: Physical Therapy (In Progress)       Point: Mobility training (Done)       Learning Progress Summary            Patient Acceptance, E, VU by  at 11/20/2024 1041                      Point: Home exercise program (Not Started)       Learner Progress:  Not documented in this visit.              Point: Body mechanics (Done)       Learning Progress Summary            Patient Acceptance, E, VU by  at 11/20/2024 1041                      Point: Precautions (Done)       Learning Progress Summary            Patient Acceptance, E, VU by  at 11/20/2024 1041                                      User Key       Initials Effective Dates Name Provider Type [facility] 08/13/24 -  Todd Wright, PT Student PT Student PT                  PT Recommendation and Plan     Progress: (P) no change  Outcome Evaluation: (P) Pt presents close to baseline w/ functional mobility. Pt ambulated 225 ft and navigated 10 stairs w/ SBA.  No LOB noted during mobility. Pt required 1 seated rest break before stair training d/t fatigue. Pt does not require skilled IPPT services to return to PLOF. Rec d/c home w/ assist once medically stable.     Time Calculation:   PT Evaluation Complexity  History, PT Evaluation Complexity: (P) 1-2 personal factors and/or comorbidities  Examination of Body Systems (PT Eval Complexity): (P) total of 3 or more elements  Clinical Presentation (PT Evaluation Complexity): (P) stable  Clinical Decision Making (PT Evaluation Complexity): (P) low complexity  Overall Complexity (PT Evaluation Complexity): (P) low complexity     PT Charges       Row Name 11/20/24 1042             Time Calculation    Start Time 1010 (P)   -TM      PT Received On 11/20/24 (P)   -TM         Untimed Charges    PT Eval/Re-eval Minutes 32 (P)   -TM         Total Minutes    Untimed Charges Total Minutes 32 (P)   -TM       Total Minutes 32 (P)   -TM                User Key  (r) = Recorded By, (t) = Taken By, (c) = Cosigned By      Initials Name Provider Type    TM Todd Wright, PT Student PT Student                  Therapy Charges for Today       Code Description Service Date Service Provider Modifiers Qty    [id number]  PT EVAL LOW COMPLEXITY 3 11/20/2024 Todd Wright, PT Student GP 1            PT G-Codes  Outcome Measure Options: (P) AM-PAC 6 Clicks Basic Mobility (PT)  AM-PAC 6 Clicks Score (PT): (P) 24    PT Discharge Summary  Anticipated Discharge Disposition (PT): (P) home with assist    Todd Wright PT Student  11/20/2024

## 2024-11-21 ENCOUNTER — READMISSION MANAGEMENT (OUTPATIENT)
Dept: CALL CENTER | Facility: HOSPITAL | Age: 24
End: 2024-11-21
Payer: COMMERCIAL

## 2024-11-21 NOTE — OUTREACH NOTE
Prep Survey      Flowsheet Row Responses   [facility] from? [facility]   Is LACE score < 7 ? No   Eligibility Readm Mgmt   Discharge diagnosis Post ERCP pancreatitis   Does the patient have one of the following disease processes/diagnoses(primary or secondary)? Other   Does the patient have Home health ordered? No   Is there a DME ordered? No   Medication alerts for this patient see avs   Prep survey completed? Yes            Faviola SEN - Registered Nurse

## 2024-11-23 LAB
BACTERIA SPEC AEROBE CULT: NORMAL
BACTERIA SPEC AEROBE CULT: NORMAL

## 2024-11-25 ENCOUNTER — TELEPHONE (OUTPATIENT)
Dept: GASTROENTEROLOGY | Facility: CLINIC | Age: 24
End: 2024-11-25
Payer: COMMERCIAL

## 2024-11-25 NOTE — TELEPHONE ENCOUNTER
PATIENT HAS BEEN SCHEDULED FOR A HOSPITAL FOLLOW UP ON 12.6.2024 (DUE TO AVAILABILITY).  THIS IS SOONER THAN THE RECOMMENDATIONS ON THE DISCHARGE SUMMARY.     PLEASE LET ME KNOW IF THIS NEEDS CHANGED.

## 2024-11-26 ENCOUNTER — READMISSION MANAGEMENT (OUTPATIENT)
Dept: CALL CENTER | Facility: HOSPITAL | Age: 24
End: 2024-11-26
Payer: COMMERCIAL

## 2024-11-26 NOTE — OUTREACH NOTE
Medical Week 1 Survey      Flowsheet Row Responses   [facility] patient discharged from? [place]   Does the patient have one of the following disease processes/diagnoses(primary or secondary)? Other   Week 1 attempt successful? Yes   Call start time 1026   Call end time 1027   Discharge diagnosis Post ERCP pancreatitis   Person spoke with today (if not patient) and relationship pt   Meds reviewed with patient/caregiver? Yes   Is the patient having any side effects they believe may be caused by any medication additions or changes? No   Does the patient have all medications ordered at discharge? Yes   Is the patient taking all medications as directed (includes completed medication regime)? Yes   Does the patient have a primary care provider?  Yes   Does the patient have an appointment with their PCP within 7 days of discharge? Yes   Has the patient kept scheduled appointments due by today? Yes   Comments GI 12/6/24   Psychosocial issues? No   Did the patient receive a copy of their discharge instructions? Yes   Nursing interventions Reviewed instructions with patient   What is the patient's perception of their health status since discharge? Improving   Is the patient/caregiver able to teach back signs and symptoms related to disease process for when to call PCP? Yes   Is the patient/caregiver able to teach back signs and symptoms related to disease process for when to call 911? Yes   Is the patient/caregiver able to teach back the hierarchy of who to call/visit for symptoms/problems? PCP, Specialist, Home health nurse, Urgent Care, ED, 911 Yes   If the patient is a current smoker, are they able to teach back resources for cessation? Not a smoker   Week 1 call completed? Yes   Would this patient benefit from a Referral to Amb Social Work? No   Is the patient interested in additional calls from an ambulatory ? No   Wrap up additional comments Pt states she does have some abdominal pain, and denies  nausea. Reviewed AVS/meds with pt. Pt had PCP fu appt, and has upcoming GI appt 12/6/24   Call end time 1029            Anat ZABALA - Registered Nurse

## 2024-12-05 RX ORDER — SEMAGLUTIDE 2.4 MG/.75ML
2.4 INJECTION, SOLUTION SUBCUTANEOUS WEEKLY
COMMUNITY

## 2024-12-06 ENCOUNTER — OFFICE VISIT (OUTPATIENT)
Dept: GASTROENTEROLOGY | Facility: CLINIC | Age: 24
End: 2024-12-06
Payer: COMMERCIAL

## 2024-12-06 VITALS
HEART RATE: 88 BPM | SYSTOLIC BLOOD PRESSURE: 100 MMHG | BODY MASS INDEX: 32.68 KG/M2 | DIASTOLIC BLOOD PRESSURE: 60 MMHG | HEIGHT: 62 IN | WEIGHT: 177.6 LBS

## 2024-12-06 DIAGNOSIS — Z90.49 STATUS POST CHOLECYSTECTOMY: ICD-10-CM

## 2024-12-06 DIAGNOSIS — D72.829 LEUKOCYTOSIS, UNSPECIFIED TYPE: ICD-10-CM

## 2024-12-06 DIAGNOSIS — R74.8 ELEVATED ALKALINE PHOSPHATASE LEVEL: ICD-10-CM

## 2024-12-06 DIAGNOSIS — R10.816 EPIGASTRIC ABDOMINAL TENDERNESS, REBOUND TENDERNESS PRESENCE NOT SPECIFIED: ICD-10-CM

## 2024-12-06 DIAGNOSIS — K85.90 POST-ERCP ACUTE PANCREATITIS: Primary | ICD-10-CM

## 2024-12-06 DIAGNOSIS — K91.89 POST-ERCP ACUTE PANCREATITIS: Primary | ICD-10-CM

## 2024-12-06 DIAGNOSIS — J90 PLEURAL EFFUSION: ICD-10-CM

## 2024-12-06 NOTE — PROGRESS NOTES
GASTROENTEROLOGY OFFICE NOTE    Eder Wills  [id number]  2000    CARE TEAM  Patient Care Team:  Eva Tovar MD as PCP - General (Internal Medicine)    Referring Provider: Dick Patrick MD    Chief Complaint   Patient presents with    Pancreatitis     New patient in for follow up from visit to ER.         HISTORY OF PRESENT ILLNESS:   Eder Wills is a 24 y.o. female who presents accompanied by her mother who provides some information during today's office visit for [facility] admission follow up.     Review of medical record revealed patient was  was admitted admitted 11/7 through 11/15 for right upper quadrant abdominal pain, found to have biliary obstruction, underwent ERCP 11/9/2024 per DR. Patrick followed by cholecystectomy 11/10/2024 with BRUNO drain placement per Dr. Raza.  Hospital admission complicated by ERCP induced pancreatitis, patient able to tolerate intake and having appropriate bowel movements prior to discharge.  GI soft diet for 1 week after discharge recommended.  Follow-up with general surgery in 2 weeks recommended.  It was documented patient had tachycardia during inpatient admission and recommended follow-up with primary care physician to evaluate for weaning off metoprolol as an outpatient.    She was readmitted 11/18/2024 to 11/20/2024 due to left sided flank pain with Review of discharge documentation that revealed etiology of flank pain seems multifactorial, imaging revealed pleural effusion with some atelectasis, patient has leukocytosis possibly due to pancreatitis, procalcitonin level low with current viral infection (positive for enterovirus/rhinovirus), lidocaine patch recommended for flank pain.   repeat imaging in 4 to 6 weeks recommended.     She continues to experience some shortness of breath, chest pain.  She also reports continued left flank pain.  Eating seems to aggravate pain.  She is trying to eat bland diet.  Seems as though intake of dairy and  higher fat food aggravated pain since hospital discharge.  She previously used lidocaine patch for flank pain but it did not seem helpful.  She reports some improvement in pain with use of Tylenol.    She has history of taking Wegovy for approximately 1 year but discontinued use following hospital admission at the beginning of November 2024.    She reports 1-2 bowel movements per day without sensation of incomplete evacuation.  She denies blood per rectum.  She does not feel as though she is experiencing increased gas.    She reports primary care provider rechecked CBC and white count elevated but improved around 19.5.  She plans to have CBC checked again in 3 days.    Past Medical History:   Diagnosis Date    Anxiety     Lower extremity edema 11/18/2024    Obesity (BMI 30-39.9) 11/11/2024    Pancreatitis         Past Surgical History:   Procedure Laterality Date    ADENOIDECTOMY  2008    CHOLECYSTECTOMY N/A 11/10/2024    Procedure: CHOLECYSTECTOMY LAPAROSCOPIC;  Surgeon: Raheem Raza MD;  Location:  [facility];  Service: General;  Laterality: N/A;    COSMETIC SURGERY      2021    ERCP N/A 11/9/2024    Procedure: ENDOSCOPIC RETROGRADE CHOLANGIOPANCREATOGRAPHY;  Surgeon: Dick Patrick MD;  Location:  [facility];  Service: Gastroenterology;  Laterality: N/A;    TONSILLECTOMY  2008        Current Outpatient Medications on File Prior to Visit   Medication Sig    amphetamine-dextroamphetamine XR (ADDERALL XR) 30 MG 24 hr capsule Take 1 capsule by mouth Daily    FLUoxetine (PROzac) 40 MG capsule Take 1 capsule by mouth Daily.    [DISCONTINUED] Lidocaine 4 % Place 1 patch on the skin as directed by provider Daily. Remove & Discard patch within 12 hours or as directed by MD    metoprolol tartrate (LOPRESSOR) 25 MG tablet Take 1 tablet by mouth 2 (Two) Times a Day for 30 days. (Patient not taking: Reported on 12/6/2024)    naloxone (NARCAN) 4 MG/0.1ML nasal spray Call 911. Don't prime. Spray in 1 nostril for  "overdose. Repeat in 2-3 minutes in other nostril if no or minimal breathing/responsiveness. (Patient not taking: Reported on 12/6/2024)    Wegovy 2.4 MG/0.75ML solution auto-injector Inject 2.4 mg under the skin into the appropriate area as directed 1 (One) Time Per Week. (Patient not taking: Reported on 12/6/2024)     No current facility-administered medications on file prior to visit.       No Known Allergies    Family History   Problem Relation Age of Onset    No Known Problems Mother     No Known Problems Father     Colon cancer Neg Hx        Social History     Socioeconomic History    Marital status: Single   Tobacco Use    Smoking status: Never    Smokeless tobacco: Never   Vaping Use    Vaping status: Never Used   Substance and Sexual Activity    Alcohol use: Yes     Comment: socially    Drug use: Never    Sexual activity: Defer       PHYSICAL EXAM   /60 (BP Location: Left arm, Patient Position: Sitting, Cuff Size: Adult)   Pulse 88   Ht 157.5 cm (62\")   Wt 80.6 kg (177 lb 9.6 oz)   LMP 10/29/2024 (Approximate)   BMI 32.48 kg/m²   Physical Exam  Constitutional:       General: She is not in acute distress.  HENT:      Head: Normocephalic and atraumatic. No contusion.      Right Ear: External ear normal.      Left Ear: External ear normal.   Eyes:      General: Lids are normal. No scleral icterus.        Right eye: No discharge.         Left eye: No discharge.      Extraocular Movements: Extraocular movements intact.   Neck:      Trachea: Trachea normal.      Comments: No visible mass  No visible adenopathy  Cardiovascular:      Rate and Rhythm: Normal rate.   Pulmonary:      Effort: No respiratory distress.      Comments: Symmetrical expansion    Abdominal:      Palpations: There is no mass.      Tenderness: There is abdominal tenderness in the epigastric area.      Comments: TTP left flank area   Musculoskeletal:      Comments: Symmetrical movement of upper extremities  Symmetrical movement of " lower extremities  No visible deformities   Skin:     General: Skin is warm and dry.      Coloration: Skin is not jaundiced.   Neurological:      General: No focal deficit present.      Mental Status: She is alert and oriented to person, place, and time.   Psychiatric:         Mood and Affect: Mood normal.         Behavior: Behavior normal.         Thought Content: Thought content normal.     Results Review:  8/18/2024 CT abdomen and pelvis without contrast revealed decompressed bladder, anteverted uterus and otherwise normal.  11/7/2024 CT abdomen pelvis with contrast revealed cholelithiasis, mild stranding around gallbladder wall and mild intra and proximal extrahepatic biliary ductal dilatation  11/8/2024 ultrasound gallbladder revealed multiple stones in gallbladder, marked biliary ductal dilatation up to 1.5 cm, obstructing gallstone in the differential for which MRI or ERCP may be required.  11/8/2024 MRCP revealed cholelithiasis, acute cholecystitis with choledocholithiasis  11/9/2024 ERCP Per Dr. Patrick with review of procedure document that revealed biliary tree swept nothing found, no filling defects in the main bile duct on final balloon occlusion cholangiogram, cystic duct partially opacified, biliary tree swept a few more times following final cholangiogram and nothing found.  Biliary sphincterotomy performed.  11/11/2024 CT angio chest, abdomen  revealed findings compatible with acute pancreatitis, moderate amount of abdominal and small amount of pelvic ascites, small nonencapsulated fluid collection in gallbladder fossa, status post cholecystectomy, small amount of free air compatible with recent surgery, surgical drain in place, small bilateral pleural effusions with mild compressive bibasilar atelectasis  11/18/2024 CT abdomen pelvis with contrast revealed findings consistent with acute pancreatitis, bilateral lower lobe atelectasis versus pneumonia, mildly progressed as compared to previous study,  small bilateral pleural effusions improved as compared to prior study  11/18/2024 x-ray chest revealed mild pulmonary edema with probable small effusions compared to prior study  Ultrasound chest revealed small to moderate left pleural effusion and atelectasis of lower lung.    CMP          11/15/2024    08:59 11/18/2024    01:20 11/18/2024    05:26 11/20/2024    07:20   CMP   Glucose 97  107  100  95    BUN 7  5  5  3    Creatinine 0.35  0.45  0.47  0.45    EGFR 148.4  139.7  138.2  139.7    Sodium 138  136  137  136    Potassium 4.1  4.1  4.1  4.2    Chloride 105  101  103  102    Calcium 7.9  8.2  8.2  8.7    Total Protein  5.8  4.8  5.7    Albumin  2.8  2.7  3.0    Globulin  3.0  2.1  2.7    Total Bilirubin  0.7  0.7  0.7    Alkaline Phosphatase  195  157  175    AST (SGOT)  32  18  32    ALT (SGPT)  31  29  32    Albumin/Globulin Ratio  0.9  1.3  1.1    BUN/Creatinine Ratio 20.0  11.1  10.6  6.7    Anion Gap 11.0  11.0  9.0  11.0      CBC          11/18/2024    01:20 11/18/2024    05:27 11/19/2024    08:50 11/20/2024    07:20   CBC   WBC 28.63  24.41  23.46  21.09     21.47    RBC 3.28  3.19  3.27  3.16     3.14    Hemoglobin 9.9  9.4  9.6  9.5     9.4    Hematocrit 30.3  29.7  30.7  29.0     29.4    MCV 92.4  93.1  93.9  91.8     93.6    MCH 30.2  29.5  29.4  30.1     29.9    MCHC 32.7  31.6  31.3  32.8     32.0    RDW 14.5  14.2  14.5  14.2     14.5    Platelets 486  449  536  539     553        ASSESSMENT / PLAN  1. Post-ERCP acute pancreatitis, ERCP 11/9/2024  2. Status post cholecystectomy, 11/10/2024  3. Elevated alkaline phosphatase level  4. Leukocytosis, unspecified type  5. Epigastric abdominal tenderness, rebound tenderness presence not specified  - patient underwent ERCP 11/9/2024 per Dr. Patrick with biliary sphincter performed without identification of choledocholithiasis followed by cholecystectomy 11/10/2024 due to cholelithiasis and cholecystitis per Dr. Raza.  She was diagnosed with post ERCP  pancreatitis.  Following cholecystectomy she was also found to have bilateral pleural effusions versus pneumonia.  She also had ascites from pancreatitis.  She had BRUNO drain in place following cholecystectomy to drain ascites.  -She continues to have left side/flank pain that could be due to pancreatitis but she was also previously found to have pleural effusions.  Plan for repeat CT scan of abdomen which will hopefully include evaluation for pleural effusions.  -It was documented by Dr. Patrick on 11/19/2024 that given severity of post ERCP pancreatitis, it will take weeks if not months to resolve/heal and that course is often dynamic and make adjustments as needed based off her course.  -Incentive spirometer previously recommended  -Hydrocodone previously prescribed.  Recommend acetaminophen and/or ibuprofen as needed for pain  - recommend patient return to ED if worsening symptoms.   - Comprehensive Metabolic Panel; Future  - CT Abdomen With Contrast; Future  6. Pleural effusion  - due to report of feeling SOA and continued left flank pain, reevaluate pleural effusion with CT scan  - CT Abdomen With Contrast; Future  Return in about 3 months (around 3/6/2025).    Calli Márquez, APRN  12/06/2024

## 2024-12-13 ENCOUNTER — TELEPHONE (OUTPATIENT)
Dept: GASTROENTEROLOGY | Facility: CLINIC | Age: 24
End: 2024-12-13
Payer: COMMERCIAL

## 2024-12-13 NOTE — TELEPHONE ENCOUNTER
Caller: CARTER ALVES    Relationship to patient: FATHER    Best call back number: [phone]    Patient is needing: PT WAS IN HOSPITAL WITH SEVERE PANCREATITIS, DR. BAKER WAS HER SURGEON. THEY NEED PAPERWORK FILLED OUT FOR TUITION REIMBURSEMENT AND ARE WONDERING IF THERE IS AN EMAIL THEY CAN EMAIL THAT PAPERWORK TO.

## 2024-12-16 NOTE — TELEPHONE ENCOUNTER
I called the patients father and let him know that Dr. Patrick was a hospitalist and only saw patients in the inpatient setting. I suggested they contact the patients PCP or any other outpatient provider that has been seeing the patient during this time.

## 2024-12-18 DIAGNOSIS — J90 PLEURAL EFFUSION: Primary | ICD-10-CM

## 2024-12-20 ENCOUNTER — HOSPITAL ENCOUNTER (OUTPATIENT)
Dept: CT IMAGING | Facility: HOSPITAL | Age: 24
Discharge: HOME | End: 2024-12-20
Admitting: NURSE PRACTITIONER
Payer: COMMERCIAL

## 2024-12-20 DIAGNOSIS — R74.8 ELEVATED ALKALINE PHOSPHATASE LEVEL: ICD-10-CM

## 2024-12-20 DIAGNOSIS — K91.89 POST-ERCP ACUTE PANCREATITIS: ICD-10-CM

## 2024-12-20 DIAGNOSIS — R10.816 EPIGASTRIC ABDOMINAL TENDERNESS, REBOUND TENDERNESS PRESENCE NOT SPECIFIED: ICD-10-CM

## 2024-12-20 DIAGNOSIS — K85.90 POST-ERCP ACUTE PANCREATITIS: ICD-10-CM

## 2024-12-20 DIAGNOSIS — J90 PLEURAL EFFUSION: ICD-10-CM

## 2024-12-20 DIAGNOSIS — D72.829 LEUKOCYTOSIS, UNSPECIFIED TYPE: ICD-10-CM

## 2024-12-20 DIAGNOSIS — Z90.49 STATUS POST CHOLECYSTECTOMY: ICD-10-CM

## 2024-12-20 PROCEDURE — 25510000001 IOPAMIDOL 61 % SOLUTION: Performed by: NURSE PRACTITIONER

## 2024-12-20 PROCEDURE — 71250 CT THORAX DX C-: CPT

## 2024-12-20 PROCEDURE — 74160 CT ABDOMEN W/CONTRAST: CPT

## 2024-12-20 RX ORDER — IOPAMIDOL 612 MG/ML
85 INJECTION, SOLUTION INTRAVASCULAR
Status: COMPLETED | OUTPATIENT
Start: 2024-12-20 | End: 2024-12-20

## 2024-12-20 RX ADMIN — IOPAMIDOL 85 ML: 612 INJECTION, SOLUTION INTRAVENOUS at 14:02

## 2024-12-27 ENCOUNTER — PREP FOR SURGERY (OUTPATIENT)
Dept: OTHER | Facility: HOSPITAL | Age: 24
End: 2024-12-27
Payer: COMMERCIAL

## 2024-12-27 DIAGNOSIS — J90 PLEURAL EFFUSION: Primary | ICD-10-CM

## 2024-12-27 NOTE — PROGRESS NOTES
I spoke to patient and patient's mother via telephone to review imaging results that revealed improved pancreatitis but moderate left pleural effusion.  Patient reports overall feeling better but does have left side back pain.  Epigastric pain and breathing improved.  Due to moderate pleural effusion with left-sided back pain recommend thoracentesis.  Order placed for thoracentesis.

## 2025-01-02 ENCOUNTER — PREP FOR SURGERY (OUTPATIENT)
Dept: OTHER | Facility: HOSPITAL | Age: 25
End: 2025-01-02
Payer: COMMERCIAL

## 2025-01-02 RX ORDER — SODIUM CHLORIDE 0.9 % (FLUSH) 0.9 %
10 SYRINGE (ML) INJECTION AS NEEDED
Status: CANCELLED | OUTPATIENT
Start: 2025-01-02

## 2025-01-02 NOTE — NURSING NOTE
As previously addressed in multiple calls about the same issue, I have talked to Dr. Land's team about Ajith and can bring up the issue again with Dr. Land.  But in most circumstances the Neurologist prefers to meet with the patient and evaluate them before ordering imaging.  They may want a different study, or he may want to perform a lumbar puncture to obtain spinal fluid.    Again, I'll bring up her concerns (again) to Dr. Land.  BW   Pre procedure call for thoracentesis scheduled 1-3-2025 at 1100. Reviewed medications and history, need for  and to remain NPO after midnight. Procedure explained. Questions addressed. Unit phone number provided

## 2025-01-03 ENCOUNTER — HOSPITAL ENCOUNTER (OUTPATIENT)
Dept: ULTRASOUND IMAGING | Facility: HOSPITAL | Age: 25
Discharge: HOME | End: 2025-01-03
Payer: COMMERCIAL

## 2025-01-03 VITALS
TEMPERATURE: 97.7 F | HEART RATE: 61 BPM | BODY MASS INDEX: 32.2 KG/M2 | WEIGHT: 175 LBS | SYSTOLIC BLOOD PRESSURE: 95 MMHG | DIASTOLIC BLOOD PRESSURE: 63 MMHG | OXYGEN SATURATION: 97 % | HEIGHT: 62 IN | RESPIRATION RATE: 12 BRPM

## 2025-01-03 DIAGNOSIS — J90 PLEURAL EFFUSION: ICD-10-CM

## 2025-01-03 LAB — B-HCG UR QL: NEGATIVE

## 2025-01-03 PROCEDURE — 76942 ECHO GUIDE FOR BIOPSY: CPT

## 2025-01-03 PROCEDURE — 81025 URINE PREGNANCY TEST: CPT | Performed by: NURSE PRACTITIONER

## 2025-01-03 RX ORDER — SODIUM CHLORIDE 0.9 % (FLUSH) 0.9 %
10 SYRINGE (ML) INJECTION AS NEEDED
Status: DISCONTINUED | OUTPATIENT
Start: 2025-01-03 | End: 2025-01-04 | Stop reason: HOSPADM

## 2025-01-03 NOTE — PRE-PROCEDURE NOTE
[facility]   Vascular Interventional Radiology  History & Physicial        Patient Name:Eder Wills    : 2000    MRN: [id number]    Primary Care Physician: Ramon Early MD    Referring Physician: MARITZA Vasquez     Date of admission: 1/3/2025    Subjective     Reason for Consult: Left thoracentesis for pleural effusion.    History of Present Illness     Eder Wills is a 24 y.o. female referred to IR as noted above.      Active Hospital Problems:  There are no active hospital problems to display for this patient.      Personal History     Past Medical History:   Diagnosis Date    Anxiety     Lower extremity edema 2024    Obesity (BMI 30-39.9) 2024    Pancreatitis        Past Surgical History:   Procedure Laterality Date    ADENOIDECTOMY      CHOLECYSTECTOMY N/A 11/10/2024    Procedure: CHOLECYSTECTOMY LAPAROSCOPIC;  Surgeon: Raheem Raza MD;  Location:  [email or web address] OR;  Service: General;  Laterality: N/A;    COSMETIC SURGERY          ERCP N/A 2024    Procedure: ENDOSCOPIC RETROGRADE CHOLANGIOPANCREATOGRAPHY;  Surgeon: Dick Patrick MD;  Location:  [email or web address];  Service: Gastroenterology;  Laterality: N/A;    TONSILLECTOMY         Family History: Her family history includes No Known Problems in her father and mother.     Social History: She  reports that she has never smoked. She has never used smokeless tobacco. She reports current alcohol use. She reports that she does not use drugs.    Home Medications:  FLUoxetine, Semaglutide-Weight Management, amphetamine-dextroamphetamine XR, metoprolol tartrate, and naloxone    Current Medications:    sodium chloride     Allergies:  No Known Allergies    Review of Systems    IR Procedure pertinent significant findings are mentioned in the PMH and PSH above.    Objective     Visit Vitals  BP 94/57 (BP Location: Right arm, Patient Position: Lying)   Pulse 57   Temp 97.7 °F (36.5 °C) (Temporal)   Resp 16  "  Ht 157.5 cm (62\")   Wt 79.4 kg (175 lb)   SpO2 97%   BMI 32.01 kg/m²        Physical Exam    A&Ox3.   Able to communicate  No Apparent Distress  Average physique   CVS: VS as noted. Chart reviewed. Stable for the procedure.  Respiratory: Non labored breathing. No signs of respiratory compromise.    Result Review      I have personally reviewed the results from the time of this admission to 1/3/2025 11:42 EST and agree with these findings.  [x]  Laboratory  []  Microbiology  [x]  Radiology  []  EKG/Telemetry   []  Cardiology/Vascular   []  Pathology  []  Old records  []  Other:    Most notable findings include: As noted:      Recent CBC & INR reviewed.                CrCl cannot be calculated (Patient's most recent lab result is older than the maximum 30 days allowed.). No results found for: \"CREATININE\"    COVID19   Date Value Ref Range Status   11/18/2024 Not Detected Not Detected - Ref. Range Final        Lab Results   Component Value Date    PREGTESTUR Negative 01/03/2025    HCGQUANT <0.10 11/18/2024        ASA SCALE ASSESSMENT (applicable ONLY if sedation planned):        MALLAMPATI CLASSIFICATION (applicable ONLY if sedation planned):       Assessment / Plan     Eder Wills is a 24 y.o. female referred to the IR service with above problem.    Plan:   As above.    MARITZA Whitaker   Vascular Interventional Radiology  01/03/25   11:42 AM EST     "

## 2025-01-03 NOTE — NURSING NOTE
No fluid seen in lung and thoracentesis not done. No medications were given per report. Family at bedside and updated. VSS. IV dc'd.

## 2025-01-03 NOTE — NURSING NOTE
Decision to cancel procedure made by MARITZA Nicolas, due to lack of fluid volume. Patient verbalized understanding. Report called to PHIL CRABTREE.

## 2025-01-03 NOTE — POST-PROCEDURE NOTE
Limited US performed at region of interest. Scant fluid left pleural space. No thoracentesis performed.    no

## 2025-01-23 DIAGNOSIS — K85.90 POST-ERCP ACUTE PANCREATITIS: ICD-10-CM

## 2025-01-23 DIAGNOSIS — K91.89 POST-ERCP ACUTE PANCREATITIS: ICD-10-CM

## 2025-01-23 DIAGNOSIS — Z87.09 HISTORY OF PLEURAL EFFUSION: ICD-10-CM

## 2025-01-23 DIAGNOSIS — R10.816 EPIGASTRIC ABDOMINAL TENDERNESS, REBOUND TENDERNESS PRESENCE NOT SPECIFIED: Primary | ICD-10-CM

## 2025-02-04 ENCOUNTER — HOSPITAL ENCOUNTER (OUTPATIENT)
Dept: CT IMAGING | Facility: HOSPITAL | Age: 25
Discharge: HOME | End: 2025-02-04
Admitting: NURSE PRACTITIONER
Payer: COMMERCIAL

## 2025-02-04 DIAGNOSIS — Z87.09 HISTORY OF PLEURAL EFFUSION: ICD-10-CM

## 2025-02-04 DIAGNOSIS — K91.89 POST-ERCP ACUTE PANCREATITIS: ICD-10-CM

## 2025-02-04 DIAGNOSIS — R10.816 EPIGASTRIC ABDOMINAL TENDERNESS, REBOUND TENDERNESS PRESENCE NOT SPECIFIED: ICD-10-CM

## 2025-02-04 DIAGNOSIS — K85.90 POST-ERCP ACUTE PANCREATITIS: ICD-10-CM

## 2025-02-04 PROCEDURE — 74160 CT ABDOMEN W/CONTRAST: CPT

## 2025-02-04 PROCEDURE — 25510000001 IOPAMIDOL 61 % SOLUTION: Performed by: NURSE PRACTITIONER

## 2025-02-04 RX ORDER — IOPAMIDOL 612 MG/ML
100 INJECTION, SOLUTION INTRAVASCULAR
Status: COMPLETED | OUTPATIENT
Start: 2025-02-04 | End: 2025-02-04

## 2025-02-04 RX ADMIN — IOPAMIDOL 80 ML: 612 INJECTION, SOLUTION INTRAVENOUS at 12:45

## 2025-04-16 ENCOUNTER — OFFICE VISIT (OUTPATIENT)
Dept: GASTROENTEROLOGY | Facility: CLINIC | Age: 25
End: 2025-04-16
Payer: COMMERCIAL

## 2025-04-16 VITALS
WEIGHT: 200.8 LBS | SYSTOLIC BLOOD PRESSURE: 110 MMHG | BODY MASS INDEX: 36.95 KG/M2 | HEIGHT: 62 IN | HEART RATE: 92 BPM | TEMPERATURE: 97.1 F | DIASTOLIC BLOOD PRESSURE: 73 MMHG

## 2025-04-16 DIAGNOSIS — D64.9 ANEMIA, UNSPECIFIED TYPE: ICD-10-CM

## 2025-04-16 DIAGNOSIS — K85.90 POST-ERCP ACUTE PANCREATITIS: Primary | ICD-10-CM

## 2025-04-16 DIAGNOSIS — K91.89 POST-ERCP ACUTE PANCREATITIS: Primary | ICD-10-CM

## 2025-04-16 DIAGNOSIS — Z90.49 STATUS POST CHOLECYSTECTOMY: ICD-10-CM

## 2025-04-16 DIAGNOSIS — Z87.09 HISTORY OF PLEURAL EFFUSION: ICD-10-CM

## 2025-04-16 PROBLEM — F39 MOOD DISORDER: Chronic | Status: RESOLVED | Noted: 2024-11-08 | Resolved: 2025-04-16

## 2025-04-16 PROCEDURE — 99214 OFFICE O/P EST MOD 30 MIN: CPT | Performed by: NURSE PRACTITIONER

## 2025-04-16 RX ORDER — FERROUS SULFATE 325(65) MG
1 TABLET ORAL EVERY 12 HOURS SCHEDULED
COMMUNITY
Start: 2025-02-28

## 2025-04-16 RX ORDER — ERGOCALCIFEROL 1.25 MG/1
1 CAPSULE, LIQUID FILLED ORAL WEEKLY
COMMUNITY
Start: 2025-02-28

## 2025-04-16 NOTE — PROGRESS NOTES
GASTROENTEROLOGY OFFICE NOTE    Eder Wills  [id number]  2000    CARE TEAM  Patient Care Team:  Ramon Early MD as PCP - General (General Practice)    Referring Provider: No ref. provider found    Chief Complaint   Patient presents with    Pancreatitis    Follow-up        HISTORY OF PRESENT ILLNESS:   Eder Wills is a 24 y.o. female who returns for follow-up regarding prior hospital admission due to pancreatitis.  At last office visit she reported shortness of breath, chest pain, left flank pain.  Eating aggravated pain.  She was trying to eat bland diet.    at last office visit, CT scan and labs ordered.     12/10/2024 CBC revealed normal white blood cell count 6.9, low hemoglobin 11, low hematocrit 34.6 and elevated platelet count 695.  Comprehensive metabolic panel normal.    12/20/2024 CT abdomen with contrast revealed moderate left pleural effusion with left basilar atelectasis, significantly improved pancreatitis from prior CT scan, there remains soft tissue nodularity along the omentum mesentery and retroperitoneum which could represent ongoing inflammatory changes of the recommended follow-up imaging to document resolution and exclude peritoneal carcinomatosis recommended.  I ordered thoracentesis, but at time of planned thoracentesis on 1/3/2025 there was not enough fluid for thoracentesis, imaging revealed scant amount of pleural effusion.    Repeat CT scan abdomen with contrast 2/4/2025 revealed decreased attenuation until pancreas extending into the splenic hilum that has improved significantly compared to last study without evidence of pseudocyst, extrahepatic common duct measuring 1 cm.  No intrahepatic biliary dilatation.    History of Present Illness  The patient is a 24-year-old female who returns for follow up with history as above    Symptoms are resolved. She denies dyspnea, left flank pain, and chest discomfort.     She reports recent laboratory tests were conducted  due to hair loss, and revealed low vitamin D and iron levels. She has been taking iron and vitamin D supplements for approximately 3 weeks.        Past Medical History:   Diagnosis Date    Anxiety     Lower extremity edema 11/18/2024    Obesity (BMI 30-39.9) 11/11/2024    Pancreatitis     after ERCP        Past Surgical History:   Procedure Laterality Date    ADENOIDECTOMY  2008    CHOLECYSTECTOMY N/A 11/10/2024    Procedure: CHOLECYSTECTOMY LAPAROSCOPIC;  Surgeon: Raheem Raza MD;  Location: [facility] OR;  Service: General;  Laterality: N/A;    COSMETIC SURGERY      2021    ERCP N/A 11/9/2024    Procedure: ENDOSCOPIC RETROGRADE CHOLANGIOPANCREATOGRAPHY;  Surgeon: Dick Patrick MD;  Location: [facility] ENDOSCOPY;  Service: Gastroenterology;  Laterality: N/A;    TONSILLECTOMY  2008        Current Outpatient Medications on File Prior to Visit   Medication Sig    amphetamine-dextroamphetamine XR (ADDERALL XR) 30 MG 24 hr capsule Take 1 capsule by mouth Daily    FeroSul 325 (65 Fe) MG tablet Take 1 tablet by mouth Every 12 (Twelve) Hours.    FLUoxetine (PROzac) 40 MG capsule Take 1 capsule by mouth Daily.    vitamin D (ERGOCALCIFEROL) 1.25 MG (61024 UT) capsule capsule Take 1 capsule by mouth 1 (One) Time Per Week.    metoprolol tartrate (LOPRESSOR) 25 MG tablet Take 1 tablet by mouth 2 (Two) Times a Day for 30 days. (Patient not taking: Reported on 12/6/2024)    naloxone (NARCAN) 4 MG/0.1ML nasal spray Call 911. Don't prime. [place] in 1 nostril for overdose. Repeat in 2-3 minutes in other nostril if no or minimal breathing/responsiveness. (Patient not taking: Reported on 12/6/2024)    Wegovy 2.4 MG/0.75ML solution auto-injector Inject 2.4 mg under the skin into the appropriate area as directed 1 (One) Time Per Week. (Patient not taking: Reported on 12/6/2024)     No current facility-administered medications on file prior to visit.       No Known Allergies    Family History   Problem Relation Age of Onset    No Known  "Problems Mother     No Known Problems Father     Colon cancer Neg Hx        Social History     Socioeconomic History    Marital status: Single   Tobacco Use    Smoking status: Never    Smokeless tobacco: Never   Vaping Use    Vaping status: Never Used   Substance and Sexual Activity    Alcohol use: Yes     Comment: socially    Drug use: Never    Sexual activity: Defer       PHYSICAL EXAM   /73 (BP Location: Left arm, Patient Position: Sitting, Cuff Size: Adult)   Pulse 92   Temp 97.1 °F (36.2 °C) (Temporal)   Ht 157.5 cm (62\")   Wt 91.1 kg (200 lb 12.8 oz)   BMI 36.73 kg/m²   Physical Exam  Constitutional:       General: She is not in acute distress.     Appearance: She is not toxic-appearing.   HENT:      Head: Normocephalic and atraumatic. No contusion.      Right Ear: External ear normal.      Left Ear: External ear normal.   Eyes:      General: Lids are normal. No scleral icterus.        Right eye: No discharge.         Left eye: No discharge.      Extraocular Movements: Extraocular movements intact.   Neck:      Trachea: Trachea normal.      Comments: No visible mass  No visible adenopathy  Cardiovascular:      Rate and Rhythm: Normal rate.   Pulmonary:      Effort: No respiratory distress.      Comments: Symmetrical expansion    Abdominal:      Palpations: Abdomen is soft. There is no mass.      Tenderness: There is no abdominal tenderness.   Musculoskeletal:      Comments: Symmetrical movement of upper extremities  Symmetrical movement of lower extremities  No visible deformities   Skin:     General: Skin is warm and dry.      Coloration: Skin is not jaundiced.   Neurological:      General: No focal deficit present.      Mental Status: She is alert and oriented to person, place, and time.   Psychiatric:         Mood and Affect: Mood normal.         Behavior: Behavior normal.         Thought Content: Thought content normal.         Results Review:  8/18/2024 CT abdomen and pelvis without contrast " revealed decompressed bladder, anteverted uterus and otherwise normal.  11/7/2024 CT abdomen pelvis with contrast revealed cholelithiasis, mild stranding around gallbladder wall and mild intra and proximal extrahepatic biliary ductal dilatation  11/8/2024 ultrasound gallbladder revealed multiple stones in gallbladder, marked biliary ductal dilatation up to 1.5 cm, obstructing gallstone in the differential for which MRI or ERCP may be required.  11/8/2024 MRCP revealed cholelithiasis, acute cholecystitis with choledocholithiasis  11/9/2024 ERCP Per Dr. Patrick with review of procedure document that revealed biliary tree swept nothing found, no filling defects in the main bile duct on final balloon occlusion cholangiogram, cystic duct partially opacified, biliary tree swept a few more times following final cholangiogram and nothing found.  Biliary sphincterotomy performed.  11/11/2024 CT angio chest, abdomen  revealed findings compatible with acute pancreatitis, moderate amount of abdominal and small amount of pelvic ascites, small nonencapsulated fluid collection in gallbladder fossa, status post cholecystectomy, small amount of free air compatible with recent surgery, surgical drain in place, small bilateral pleural effusions with mild compressive bibasilar atelectasis  11/18/2024 CT abdomen pelvis with contrast revealed findings consistent with acute pancreatitis, bilateral lower lobe atelectasis versus pneumonia, mildly progressed as compared to previous study, small bilateral pleural effusions improved as compared to prior study  11/18/2024 x-ray chest revealed mild pulmonary edema with probable small effusions compared to prior study  Ultrasound chest revealed small to moderate left pleural effusion and atelectasis of lower lung.    12/10/2024 CBC revealed normal white blood cell count 6.9, low hemoglobin 11, low hematocrit 34.6 and elevated platelet count 695.  Comprehensive metabolic panel normal.    12/20/2024  CT abdomen with contrast revealed moderate left pleural effusion with left basilar atelectasis, significantly improved pancreatitis from prior CT scan, there remains soft tissue nodularity along the omentum mesentery and retroperitoneum which could represent ongoing inflammatory changes of the recommended follow-up imaging to document resolution and exclude peritoneal carcinomatosis recommended.  I ordered thoracentesis, but at time of planned thoracentesis on   1/3/2025 there was not enough fluid for thoracentesis, imaging revealed scant amount of pleural effusion.    Repeat CT scan abdomen with contrast 2/4/2025 revealed decreased attenuation until pancreas extending into the splenic hilum that has improved significantly compared to last study without evidence of pseudocyst, extrahepatic common duct measuring 1 cm.  No intrahepatic biliary dilatation.      ASSESSMENT / PLAN    Assessment & Plan      1. Post-ERCP acute pancreatitis, ERCP 11/9/2024  2. History of pleural effusion  3. Status post cholecystectomy, 11/10/2024  - patient underwent ERCP 11/9/2024 per Dr. Patrick with biliary sphincterotomy performed without identification of choledocholithiasis followed by cholecystectomy 11/10/2024 due to cholelithiasis and cholecystitis per Dr. Raza.  She was diagnosed with post ERCP pancreatitis.  Following cholecystectomy she was also found to have bilateral pleural effusions versus pneumonia.  She also had ascites from pancreatitis.  She had BRUNO drain in place following cholecystectomy to drain ascites.  -repeat CT scans completed 12/2024 and 2/2025 as above (CT 2/2025 revealed decreased attenuation until pancreas extending into the splenic hilum that has improved significantly compared to last study without evidence of pseudocyst, extrahepatic common duct measuring 1 cm.  No intrahepatic biliary dilatation.)  -  she is currently asymptomatic  - Liver enzymes and white blood cell count were normal in 12/2024.  - Try  to Avoid medications that could trigger pancreatitis.  - Inform healthcare providers about previous pancreatitis before starting new medications.  - Provided a list of medications that could trigger pancreatitis to consider avoiding from up to date    4. Anemia, unspecified type  - she is taking iron supplement due to anemia.   Recommend labs as below in approximately 3 weeks with continued use of iron supplement. She is also taking vitamin D supplement  - CBC (No Diff); Future  - Comprehensive Metabolic Panel; Future  - Ferritin; Future  - Iron Profile; Future  - Vitamin B12 & Folate; Future      - Follow-up visit may be canceled if lab results are normal and symptoms are absent.  Return in about 6 months (around 10/16/2025).    Patient or patient representative verbalized consent for the use of Ambient Listening during the visit with  MARITZA Vasquez for chart documentation. 4/16/2025  13:43 EDT    MARITZA Vasquez  04/16/2025

## (undated) DEVICE — FIRST STEP BEDSIDE ADD WATER KIT - RESEALABLE STAND-UP POUCH, ENDOSCOPIC CLEANING PAD - 1 POUCH: Brand: FIRST STEP BEDSIDE ADD WATER KIT - RESEALABLE STAND-UP POUCH, ENDOSCOPIC CLEANIN

## (undated) DEVICE — SUT VIC 0 UR6 27IN VCP603H

## (undated) DEVICE — SYR LUERLOK 50ML

## (undated) DEVICE — ENDOCUT SCISSOR TIP, DISPOSABLE: Brand: RENEW

## (undated) DEVICE — LAPAROSCOPIC SMOKE FILTRATION SYSTEM: Brand: PALL LAPAROSHIELD® PLUS LAPAROSCOPIC SMOKE FILTRATION SYSTEM

## (undated) DEVICE — CANNULATING SPHINCTEROTOME: Brand: JAGTOME™ REVOLUTION RX

## (undated) DEVICE — ENDOPATH XCEL BLADELESS TROCARS WITH STABILITY SLEEVES: Brand: ENDOPATH XCEL

## (undated) DEVICE — KT ORCA ORCAPOD DISP STRL

## (undated) DEVICE — DEV LK WIREGUIDE FUSN OLYMP SCP

## (undated) DEVICE — GOWN SURG ORBIS LVL3 2XL STRL

## (undated) DEVICE — LAPAROVUE VISIBILITY SYSTEM LAPAROSCOPIC SOLUTIONS: Brand: LAPAROVUE

## (undated) DEVICE — JP PERF DRN SIL FLT 10MM FULL: Brand: CARDINAL HEALTH

## (undated) DEVICE — SAFELINER SUCTION CANISTER 1000CC: Brand: DEROYAL

## (undated) DEVICE — GLV SURG SENSICARE PI MIC PF SZ8.5 LF STRL

## (undated) DEVICE — PK LAP LASR CHOLE 10

## (undated) DEVICE — GLV SURG SENSICARE PI MIC PF SZ8 LF STRL

## (undated) DEVICE — THE BITE BLOCK MAXI, LATEX FREE STRAP IS USED TO PROTECT THE ENDOSCOPE INSERTION TUBE FROM BEING BITTEN BY THE PATIENT.

## (undated) DEVICE — PATIENT RETURN ELECTRODE, SINGLE-USE, CONTACT QUALITY MONITORING, ADULT, WITH 9FT CORD, FOR PATIENTS WEIGING OVER 33LBS. (15KG): Brand: MEGADYNE

## (undated) DEVICE — BLANKT WARM UPPR/BDY ARM/OUT 57X196CM

## (undated) DEVICE — SUT MNCRYL PLS ANTIB UD 4/0 PS2 18IN

## (undated) DEVICE — TUBING, SUCTION, 1/4" X 10', STRAIGHT: Brand: MEDLINE

## (undated) DEVICE — SOL IRR H2O BO 1000ML STRL

## (undated) DEVICE — BOWL UTIL STRL 32OZ

## (undated) DEVICE — ENDOPOUCH RETRIEVER SPECIMEN RETRIEVAL BAGS: Brand: ENDOPOUCH RETRIEVER

## (undated) DEVICE — SYR LL TP 10ML STRL

## (undated) DEVICE — INTRO ACCSR BLNT TP

## (undated) DEVICE — RETRIEVAL BALLOON CATHETER: Brand: EXTRACTOR™ PRO RX

## (undated) DEVICE — ENDOPATH XCEL UNIVERSAL TROCAR STABLILITY SLEEVES: Brand: ENDOPATH XCEL

## (undated) DEVICE — SPHINCTEROTOME: Brand: DREAMTOME™ RX 44

## (undated) DEVICE — SOLIDIFIER LIQ PREMISORB 1500CC

## (undated) DEVICE — AIR/WATER CLEANING VALVES: Brand: AIR/WATER CLEANING VALVES

## (undated) DEVICE — ENDOPATH XCEL BLUNT TIP TROCARS WITH SMOOTH SLEEVES: Brand: ENDOPATH XCEL

## (undated) DEVICE — SINGLE USE DISTAL COVER MAJ-2315: Brand: SINGLE USE DISTAL COVER

## (undated) DEVICE — SUT ETHLN 2/0 PS 18IN 585H

## (undated) DEVICE — HYBRID CO2 TUBING/CAP SET FOR OLYMPUS® SCOPES & CO2 SOURCE: Brand: ERBE

## (undated) DEVICE — [HIGH FLOW INSUFFLATOR,  DO NOT USE IF PACKAGE IS DAMAGED,  KEEP DRY,  KEEP AWAY FROM SUNLIGHT,  PROTECT FROM HEAT AND RADIOACTIVE SOURCES.]: Brand: PNEUMOSURE

## (undated) DEVICE — LUBE GEL ENDOGLIDE 1.1OZ

## (undated) DEVICE — PDS II VLT 0 107CM AG ST3: Brand: ENDOLOOP

## (undated) DEVICE — CONTN GRAD MEAS TRIANG 32OZ BLK

## (undated) DEVICE — JACKSON-PRATT 100CC BULB RESERVOIR: Brand: CARDINAL HEALTH